# Patient Record
Sex: FEMALE | Race: WHITE | NOT HISPANIC OR LATINO | Employment: OTHER | URBAN - METROPOLITAN AREA
[De-identification: names, ages, dates, MRNs, and addresses within clinical notes are randomized per-mention and may not be internally consistent; named-entity substitution may affect disease eponyms.]

---

## 2022-12-15 ENCOUNTER — CLINICAL SUPPORT (OUTPATIENT)
Dept: REHABILITATION | Facility: OTHER | Age: 87
End: 2022-12-15
Attending: INTERNAL MEDICINE
Payer: MEDICARE

## 2022-12-15 DIAGNOSIS — G89.29 CHRONIC LEFT-SIDED LOW BACK PAIN WITHOUT SCIATICA: ICD-10-CM

## 2022-12-15 DIAGNOSIS — M25.60 DECREASED RANGE OF MOTION WITH DECREASED STRENGTH: ICD-10-CM

## 2022-12-15 DIAGNOSIS — M15.9 PRIMARY OSTEOARTHRITIS INVOLVING MULTIPLE JOINTS: ICD-10-CM

## 2022-12-15 DIAGNOSIS — M54.50 CHRONIC LEFT-SIDED LOW BACK PAIN WITHOUT SCIATICA: ICD-10-CM

## 2022-12-15 DIAGNOSIS — Z74.09 IMPAIRED FUNCTIONAL MOBILITY, BALANCE, GAIT, AND ENDURANCE: ICD-10-CM

## 2022-12-15 DIAGNOSIS — R53.1 DECREASED RANGE OF MOTION WITH DECREASED STRENGTH: ICD-10-CM

## 2022-12-15 PROCEDURE — 97110 THERAPEUTIC EXERCISES: CPT | Mod: PN

## 2022-12-15 PROCEDURE — 97162 PT EVAL MOD COMPLEX 30 MIN: CPT | Mod: PN

## 2022-12-20 PROBLEM — M54.50 CHRONIC LEFT-SIDED LOW BACK PAIN WITHOUT SCIATICA: Status: ACTIVE | Noted: 2022-12-20

## 2022-12-20 PROBLEM — M25.60 DECREASED RANGE OF MOTION WITH DECREASED STRENGTH: Status: ACTIVE | Noted: 2022-12-20

## 2022-12-20 PROBLEM — R53.1 DECREASED RANGE OF MOTION WITH DECREASED STRENGTH: Status: ACTIVE | Noted: 2022-12-20

## 2022-12-20 PROBLEM — G89.29 CHRONIC LEFT-SIDED LOW BACK PAIN WITHOUT SCIATICA: Status: ACTIVE | Noted: 2022-12-20

## 2022-12-20 PROBLEM — Z74.09 IMPAIRED FUNCTIONAL MOBILITY, BALANCE, GAIT, AND ENDURANCE: Status: ACTIVE | Noted: 2022-12-20

## 2022-12-20 NOTE — PLAN OF CARE
"[facility]   Physical Therapy Initial Evaluation     Date: 12/15/2022   Name: Sonja Weeks  Clinic Number: [id number]    Therapy Diagnosis:   Encounter Diagnoses   Name Primary?    Primary osteoarthritis involving multiple joints     Impaired functional mobility, balance, gait, and endurance     Decreased range of motion with decreased strength     Chronic left-sided low back pain without sciatica      Physician: Brennan Vazquez MD    Physician Orders: PT Eval and Treat   Medical Diagnosis from Referral: M15.9 (ICD-10-CM) - Primary osteoarthritis involving multiple joints   Evaluation Date: 12/15/2022  Authorization Period Expiration: 12/31/2022   Plan of Care Expiration: 3/15/2023  Progress Note Due: 1/15/2023  Visit # / Visits authorized: 1/ 1   FOTO: 1/3    Precautions: Standard, cancer, and HTN, scoliosis, osteoporosis, R TKA, left knee arthroscopy, left reverse shoulder replacement    Time In: 3:00pm  Time Out: 3:45pm  Total Appointment Time (timed & untimed codes): 45 minutes      SUBJECTIVE     Date of onset: 3 months ago    History of current condition - Sonja reports: recently moved to [facility] from [place] and noticed gradual weakening of BLE and left low back pain. Says that she heavily relies on a RW and LBQC now to walk around and stand. Is also noting increased difficulty with transitional movements, including stair climbing, getting in/out of chair (needs 3 pillows on top of seat to get up). Back pain is a "constant soreness" with all activities.    Pt denies drop foot, change of B/B control, saddle paresthesias, unexplained weight gain/loss, fever, chills or night sweats.     Falls: none    Imaging, none: in EPIC    Prior Therapy: none for c/c  Social History: independent living at [facility], uses elevator to get to apartment, lives alone. Recently moved to [place] from [place].  Occupation: retired  Recreational: exercise with weights at [facility] with  (Travis), " 2-3x/week  Prior Level of Function: mod independent with AD (RW, LBQC)  Current Level of Function: pain and difficulty with all functional activities, including transitional movements    Pain:  Current 5/10, worst 6/10, best 4/10   Location: left low back  Description: soreness  Aggravating Factors: stair climbing (>4 steps, requires HHAx2 with sidestepping using railing), walking with AD, getting in/out of chair/bed,   Easing Factors: biofreeze, movement, change position    Patients goals: be able to get up from a chair without pain     Medical History:   No past medical history on file.    Surgical History:   Sonja Weeks  has no past surgical history on file.    Medications:   Sonja has a current medication list which includes the following prescription(s): amlodipine, aspirin, cholecalciferol (vitamin d3), diazepam, hydrochlorothiazide, levothyroxine, and rosuvastatin.    Allergies:   Review of patient's allergies indicates:   Allergen Reactions    Codeine     Pcn [penicillins]           OBJECTIVE     Postural examination/scapula alignment: Rounded shoulder, Head forward, Posterior pelvic tilt, Abnormal trunk flexion, Slouched posture, Increased kyphosis, Decreased lordosis, and elevated iliac crest (R)  Joint integrity: severe hypo throughout TSP/LSP, with mm guarding in mid-lower LSP with tight mm endfeel    Range of Motion Right Left    AROM/PROM AROM/PROM   Hip flexion Mod felipe/WFL Mod felipe/WFL   Extension Mod felipe/min felipe  Able to perform HL bridge Mod felipe/min felipe  Able to perform HL bridge   Abduction WFL WFL   Adduction To minline To minline   ER Min felipe Mod felipe   IR Min felipe Mod felipe   Knee ext WFL WFL   Knee flexion WFL WFL   DF Min felipe Min felipe   PF WFL WFL   INV WFL WFL   EV WFL WFL     Flexibility: Right  Left  SEE Mod hypo Mod hypo  FADDIR Min hypo Mod hypo  Ely's  Mod felipe Mod felpie  Ruben NT  NT  Active SLR <60 deg <60 deg  G/S complex Max hypo Max hypo    Lower Extremity Strength  Right LE   "Left LE    Hip flexion: 3+/5 Hip flexion: 3+/5   Hip extension:  3+/5 Hip extension: 3+/5   Hip abduction: 4-/5 Hip abduction: 4-/5   Hip adduction:  4+/5 Hip adduction:  4+/5   Knee Flexion 4+/5 Knee Flexion 4+/5   Knee Extension 4/5 Knee Extension 4-/5   Ankle dorsiflexion: 5/5 Ankle dorsiflexion: 5/5   Ankle plantarflexion: 5/5 Ankle plantarflexion: 5/5       GAIT:  Assistive Device used: LBQC  Level of Assistance: mod I with AD  Patient displays the following gait deviations:  unsteady gait, decreased step length, and decreased base of support.     Bed Mobility:Assistance - mod I, definite use of HHAx2  Transfers: Assistance - mod I, HHAx2 on arm rests  Stair climbing: step through, HHAx2 on handrails, decreased speed with descent     Evaluation   Timed Up and Go  20" with AD   Single Limb Stance R LE UA without LOB   Single Limb Stance L LE UA without LOB   Self Selected Walking Speed slow   Fast Walking Speed No change   30 second Chair Rise 4x   Tandem stand  UA  <10" in staggered stance   2 Minute Walk Test NT         RODGERS Assessment - NT, consider assessing next visit    Limitation/Restriction for FOTO NOC-neurodevelopmental disorder Survey    Therapist reviewed FOTO scores for Sonja Weeks on 12/15/2022.   FOTO documents entered into [employer] - see Media section.    Limitation Score: 49%         TREATMENT     Total Treatment time (time-based codes) separate from Evaluation: 15 minutes      Sonja received the treatments listed below:      therapeutic exercises to develop strength, endurance, ROM, flexibility, posture, and core stabilization for 15 minutes including:  LTR x 10  SKTC x 10  Patient edu x 5'        PATIENT EDUCATION AND HOME EXERCISES     Education provided:   - Patient educated regarding pathogenesis, diagnosis, protocol, prognosis, POC, and HEP, including use of visual assistance for understanding of anatomy and dysfunction. Written Home Exercises Provided with written and verbal " instructions for frequency and duration of the following exercises: see list above. Pt educated on HEP and activity modifications to reduce c/o pain and improve overall function.   - Pt was educated in posture and body mechanics.  Use of a lumbar roll was recommended and demonstrated here today.  Purchase information provided.   - Pt also educated on use of modalities prn to reduce c/o pain and dysfunction.         Written Home Exercises Provided: yes. Exercises were reviewed and Sonja was able to demonstrate them prior to the end of the session.  Sonja demonstrated good  understanding of the education provided. See EMR under Patient Instructions for exercises provided during therapy sessions.    ASSESSMENT     Sonja is a 89 y.o. female referred to outpatient Physical Therapy with a medical diagnosis of low back pain with a Hx of multi-joint OA. Patient presents with marked limitations in ROM, joint and myofascial mobility, flexibility, strength, postural awareness/endurance, motor control and coordination. S/s associated with referring diagnosis with postural abnormalities and trunk/extremity weakness facilitating c/o pain, instability. Impairments limit pt with all functional activities including walking, getting in/out of a chair.      Patient prognosis is Good.   Patient will benefit from skilled outpatient Physical Therapy to address the deficits stated above and in the chart below, provide patient /family education, and to maximize patientt's level of independence.     Plan of care discussed with patient: Yes  Patient's spiritual, cultural and educational needs considered and patient is agreeable to the plan of care and goals as stated below:     Anticipated Barriers for therapy: standard    Medical Necessity is demonstrated by the following  History  Co-morbidities and personal factors that may impact the plan of care Co-morbidities:   advanced age, coping style/mechanism, and transportation assistance  required    Personal Factors:   age  social background  lifestyle     moderate   Examination  Body Structures and Functions, activity limitations and participation restrictions that may impact the plan of care Body Regions:   back  lower extremities  trunk    Body Systems:    gross symmetry  ROM  strength  gross coordinated movement  balance  gait  transfers  transitions  motor control  motor learning  Joint mobility, flexibility, myofascial mobility, posture    Participation Restrictions:   ADls, HHCs, transitional movements    Activity limitations:   Learning and applying knowledge  listening  [place]    General Tasks and Commands  undertaking multiple tasks  Requires frequent repeating of directions    Communication  no deficits    Mobility  walking    Self care  washing oneself (bathing, drying, washing hands)  caring for body parts (brushing teeth, shaving, grooming)  dressing  looking after one's health    Domestic Life  shopping  assisting others    Interactions/Relationships  no deficits    Life Areas  no deficits    Community and Social Life  community life  recreation and leisure  Rastafarian and spirituality         moderate   Clinical Presentation evolving clinical presentation with changing clinical characteristics moderate   Decision Making/ Complexity Score: moderate     Goals:  Short Term Goals (6 Weeks):  1. Pt able to stand >=30 minutes with ADLs with mod difficulty. (Not met, progressing)  2. Pt able to walk >=30 minutes with household chores with mod difficulty. (Not met, progressing)  3.  Pt able to transfer in/out of chairs 8x in 30 seconds as demonstrating improving functional strength. (Not met, progressing)  4. Pt able to ascend/descend curb, independently, 1 handrail, with mod difficulty. (Not met, progressing)  5. Pt to demonstrate improved functional ability with FOTO limitation <=39% disability. (Not met, progressing)    Long Term Goals (12 Weeks):  1. Pt able to stand >=30 minutes with ADLs  with min difficulty. (Not met, progressing)  2. Pt able to walk >=30 minutes with household chores with min difficulty. (Not met, progressing)  3.  Pt able to transfer in/out of chairs 12x in 30 seconds as demonstrating improving functional strength. (Not met, progressing)  4. Pt able to ascend/descend 1 flight of stairs, independently, 1 handrail, with min difficulty. (Not met, progressing)  5. Pt to demonstrate improved functional ability with FOTO limitation <=30% disability. (Not met, progressing)      PLAN   Plan of care Certification: 12/15/2022 to 3/15/2023.    Outpatient Physical Therapy 2 times weekly for 12 weeks to include the following interventions: Aquatic Therapy, Cervical/Lumbar Traction, Electrical Stimulation with dry needling prn, Gait Training, Iontophoresis (with dexamethasone prn), Manual Therapy, Moist Heat/ Ice, Neuromuscular Re-ed, Orthotic Management and Training, Patient Education, Self Care, Therapeutic Activities, and Therapeutic Exercise.  Progress HEP towards D/C. Recommend F/U with MD if symptoms worsen or do not resolve. Patient may be seen by a PTA for treatment to carry out their plan of care.  Face-to-face conferences will be held.     Jing Menchaca, PT      I CERTIFY THE NEED FOR THESE SERVICES FURNISHED UNDER THIS PLAN OF TREATMENT AND WHILE UNDER MY CARE   Physician's comments:     Physician's Signature: ___________________________________________________

## 2022-12-21 NOTE — PROGRESS NOTES
"[facility]   Physical Therapy Treatment Note     Name: Sonja Weeks  Clinic Number: [id number]    Therapy Diagnosis:   Encounter Diagnoses   Name Primary?    Impaired functional mobility, balance, gait, and endurance Yes    Decreased range of motion with decreased strength     Chronic left-sided low back pain without sciatica      Physician: Brennan Vazquez MD    Visit Date: 12/22/2022    Physician Orders: PT Eval and Treat   Medical Diagnosis from Referral: M15.9 (ICD-10-CM) - Primary osteoarthritis involving multiple joints   Evaluation Date: 12/15/2022  Authorization Period Expiration: 12/31/2022   Plan of Care Expiration: 3/15/2023  Progress Note Due: 1/15/2023  Visit # / Visits authorized: 1/ 20 (2 total)  FOTO: 1/3  PTA Visit #: 1/ 5    Precautions: Standard, cancer, and HTN, scoliosis, osteoporosis, R TKA, left knee arthroscopy, left reverse shoulder replacement     Time In: 3:20 pm  Time Out: 4:02 pm  Total Billable Time: 42 minutes    SUBJECTIVE   Pt reports: "Hurting a little more today". Having pain more on L side than R     She was compliant with home exercise program.  Response to previous treatment: "I felt ok"  Functional change: None today    Pain: 6-7/10  Location: L shoulder, L side of mid-low back, L ankle    OBJECTIVE     12/15/2022:  Postural examination/scapula alignment: Rounded shoulder, Head forward, Posterior pelvic tilt, Abnormal trunk flexion, Slouched posture, Increased kyphosis, Decreased lordosis, and elevated iliac crest (R)  Joint integrity: severe hypo throughout TSP/LSP, with mm guarding in mid-lower LSP with tight mm endfeel     Range of Motion Right Left     AROM/PROM AROM/PROM   Hip flexion Mod felipe/WFL Mod felipe/WFL   Extension Mod felipe/min felipe  Able to perform HL bridge Mod felipe/min felipe  Able to perform HL bridge   Abduction WFL WFL   Adduction To minline To minline   ER Min felipe Mod felipe   IR Min felipe Mod felipe   Knee ext WFL WFL   Knee flexion WFL WFL " "  DF Min felipe Min felipe   PF WFL WFL   INV WFL WFL   EV WFL WFL      Flexibility:      Right               Left  SEE            Mod hypo        Mod hypo  FADDIR           Min hypo         Mod hypo  Ely's                 Mod felipe           Mod felipe  Ruben           NT                   NT  Active SLR      <60 deg           <60 deg  G/S complex   Max hypo        Max hypo     Lower Extremity Strength  Right LE   Left LE     Hip flexion: 3+/5 Hip flexion: 3+/5   Hip extension:  3+/5 Hip extension: 3+/5   Hip abduction: 4-/5 Hip abduction: 4-/5   Hip adduction:  4+/5 Hip adduction:  4+/5   Knee Flexion 4+/5 Knee Flexion 4+/5   Knee Extension 4/5 Knee Extension 4-/5   Ankle dorsiflexion: 5/5 Ankle dorsiflexion: 5/5   Ankle plantarflexion: 5/5 Ankle plantarflexion: 5/5      GAIT:  Assistive Device used: LBQC  Level of Assistance: mod I with AD  Patient displays the following gait deviations:  unsteady gait, decreased step length, and decreased base of support.      Bed Mobility:Assistance - mod I, definite use of HHAx2  Transfers: Assistance - mod I, HHAx2 on arm rests  Stair climbing: step through, HHAx2 on handrails, decreased speed with descent       Evaluation   Timed Up and Go  20" with AD   Single Limb Stance R LE UA without LOB   Single Limb Stance L LE UA without LOB   Self Selected Walking Speed slow   Fast Walking Speed No change   30 second Chair Rise 4x   Tandem stand  UA  <10" in staggered stance   2 Minute Walk Test NT      RODGERS Assessment - NT, consider assessing next visit     Limitation/Restriction for FOTO NOC-neurodevelopmental disorder Survey     Therapist reviewed FOTO scores for Sonja Weeks on 12/15/2022.   FOTO documents entered into [employer] - see Media section.     Limitation Score: 49%         TREATMENT     Sonja received the bolded treatments listed below:      Patient received therapeutic exercises for 18 minutes for improved strength and AROM including:  LTR x 3'  SKTC x 10  +DKTC with PB x " "2'  +Bridges x 10  +SAQ 2 x 10  +Supine ball squeeze x 20    Patient received manual therapeutic technique for 00 minutes for improved soft tissue and joint mobility including:      Patient received neuromuscular reeducation for 24 minutes for improved proprioception and balance including:  +Forward walking in \\ bars (without AD)  +Lateral walking in \\ bars (without AD)  +NBOS on foam 3 x 30"  +Step ups on 4" step in \\ bars      Patient received therapeutic activities for 00 minutes for improved tolerance to functional activities including:      PATIENT EDUCATION AND HOME EXERCISES     Home Exercises Provided and Patient Education Provided     Education provided:   PT educated pt on importance of compliance with their HEP this visit.     Written Home Exercises Provided: Patient instructed to cont prior HEP. Exercises were reviewed and Sonja was able to demonstrate them prior to the end of the session.  Sonja demonstrated fair  understanding of the education provided. See EMR under Patient Instructions for exercises provided during therapy sessions    ASSESSMENT   Pt tolerated new exercises well with minor increase in low back pain with bridges, but able to complete reps. Pt able to complete balance activities without use of UE, but required SBA for safety.    Sonja Is progressing well towards her goals.   Pt prognosis is Good.     Pt will continue to benefit from skilled outpatient physical therapy to address the deficits listed in the problem list box on initial evaluation, provide pt/family education and to maximize pt's level of independence in the home and community environment.     Pt's spiritual, cultural and educational needs considered and pt agreeable to plan of care and goals.     Anticipated barriers to physical therapy: None    Goals:   Short Term Goals (6 Weeks):  1. Pt able to stand >=30 minutes with ADLs with mod difficulty. (Not met, progressing)  2. Pt able to walk >=30 minutes with " household chores with mod difficulty. (Not met, progressing)  3.  Pt able to transfer in/out of chairs 8x in 30 seconds as demonstrating improving functional strength. (Not met, progressing)  4. Pt able to ascend/descend curb, independently, 1 handrail, with mod difficulty. (Not met, progressing)  5. Pt to demonstrate improved functional ability with FOTO limitation <=39% disability. (Not met, progressing)     Long Term Goals (12 Weeks):  1. Pt able to stand >=30 minutes with ADLs with min difficulty. (Not met, progressing)  2. Pt able to walk >=30 minutes with household chores with min difficulty. (Not met, progressing)  3.  Pt able to transfer in/out of chairs 12x in 30 seconds as demonstrating improving functional strength. (Not met, progressing)  4. Pt able to ascend/descend 1 flight of stairs, independently, 1 handrail, with min difficulty. (Not met, progressing)  5. Pt to demonstrate improved functional ability with FOTO limitation <=30% disability. (Not met, progressing)    PLAN   Plan of care Certification: 12/15/2022 to 3/15/2023.    Improve B LE strength and balance     Outpatient Physical Therapy 2 times weekly for 12 weeks to include the following interventions: Aquatic Therapy, Cervical/Lumbar Traction, Electrical Stimulation with dry needling prn, Gait Training, Iontophoresis (with dexamethasone prn), Manual Therapy, Moist Heat/ Ice, Neuromuscular Re-ed, Orthotic Management and Training, Patient Education, Self Care, Therapeutic Activities, and Therapeutic Exercise.  Progress HEP towards D/C. Recommend F/U with MD if symptoms worsen or do not resolve      Danay Ni III, PTA

## 2022-12-22 ENCOUNTER — CLINICAL SUPPORT (OUTPATIENT)
Dept: REHABILITATION | Facility: OTHER | Age: 87
End: 2022-12-22
Payer: MEDICARE

## 2022-12-22 DIAGNOSIS — R53.1 DECREASED RANGE OF MOTION WITH DECREASED STRENGTH: ICD-10-CM

## 2022-12-22 DIAGNOSIS — Z74.09 IMPAIRED FUNCTIONAL MOBILITY, BALANCE, GAIT, AND ENDURANCE: Primary | ICD-10-CM

## 2022-12-22 DIAGNOSIS — G89.29 CHRONIC LEFT-SIDED LOW BACK PAIN WITHOUT SCIATICA: ICD-10-CM

## 2022-12-22 DIAGNOSIS — M25.60 DECREASED RANGE OF MOTION WITH DECREASED STRENGTH: ICD-10-CM

## 2022-12-22 DIAGNOSIS — M54.50 CHRONIC LEFT-SIDED LOW BACK PAIN WITHOUT SCIATICA: ICD-10-CM

## 2022-12-22 PROCEDURE — 97112 NEUROMUSCULAR REEDUCATION: CPT | Mod: PN,CQ

## 2022-12-22 PROCEDURE — 97110 THERAPEUTIC EXERCISES: CPT | Mod: PN,CQ

## 2023-01-03 ENCOUNTER — CLINICAL SUPPORT (OUTPATIENT)
Dept: REHABILITATION | Facility: OTHER | Age: 88
End: 2023-01-03
Payer: MEDICARE

## 2023-01-03 DIAGNOSIS — M25.60 DECREASED RANGE OF MOTION WITH DECREASED STRENGTH: ICD-10-CM

## 2023-01-03 DIAGNOSIS — Z74.09 IMPAIRED FUNCTIONAL MOBILITY, BALANCE, GAIT, AND ENDURANCE: Primary | ICD-10-CM

## 2023-01-03 DIAGNOSIS — G89.29 CHRONIC LEFT-SIDED LOW BACK PAIN WITHOUT SCIATICA: ICD-10-CM

## 2023-01-03 DIAGNOSIS — M54.50 CHRONIC LEFT-SIDED LOW BACK PAIN WITHOUT SCIATICA: ICD-10-CM

## 2023-01-03 DIAGNOSIS — R53.1 DECREASED RANGE OF MOTION WITH DECREASED STRENGTH: ICD-10-CM

## 2023-01-03 PROCEDURE — 97110 THERAPEUTIC EXERCISES: CPT | Mod: PN

## 2023-01-03 PROCEDURE — 97530 THERAPEUTIC ACTIVITIES: CPT | Mod: PN

## 2023-01-03 NOTE — PROGRESS NOTES
"[facility]   Physical Therapy Treatment Note     Name: Sonja Weeks  Clinic Number: [id number]    Therapy Diagnosis:   Encounter Diagnoses   Name Primary?    Impaired functional mobility, balance, gait, and endurance Yes    Decreased range of motion with decreased strength     Chronic left-sided low back pain without sciatica        Physician: Brennan Vazquez MD    Visit Date: 1/3/2023    Physician Orders: PT Eval and Treat   Medical Diagnosis from Referral: M15.9 (ICD-10-CM) - Primary osteoarthritis involving multiple joints   Evaluation Date: 12/15/2022  Authorization Period Expiration: 12/31/2022   Plan of Care Expiration: 3/15/2023  Progress Note Due: 1/15/2023  Visit # / Visits authorized: 2/ 20 (3 total)  FOTO: 1/3  PTA Visit #: 0/ 5    Precautions: Standard, cancer, and HTN, scoliosis, osteoporosis, R TKA, left knee arthroscopy, left reverse shoulder replacement     Time In: 2:15 pm  Time Out: 3:00 pm  Total Billable Time: 45 minutes    SUBJECTIVE   Pt reports: she is able to walk more at home using her cane instead of her RW. Continues to have LB with feelings of leg weakness, but she notes her legs are getting stronger as she does not need as many pillows on her chair to help with standing up now.    She was compliant with home exercise program.  Response to previous treatment: "I felt ok"  Functional change: None today    Pain: 6-7/10  Location: L shoulder, L side of mid-low back, L ankle    OBJECTIVE     See eval on 12/15/2022 for updated tests/measurements.       Range of Motion Right Left     AROM/PROM AROM/PROM   Hip flexion Mod felipe/WFL Mod felipe/WFL   Extension Mod felipe/min felipe  Able to perform HL bridge Mod felipe/min felipe  Able to perform HL bridge   Abduction WFL WFL   Adduction To minline To minline   ER Min felipe Mod felipe   IR Min felipe Mod felipe   Knee ext WFL WFL   Knee flexion WFL WFL   DF Min felipe Min felipe   PF WFL WFL   INV WFL WFL   EV WFL WFL      Flexibility:      Right  " "             Left  ESE            Mod hypo        Mod hypo  FADDIR           Min hypo         Mod hypo  Ely's                 Mod felipe           Mod felipe  Ruben           NT                   NT  Active SLR      <60 deg           <60 deg  G/S complex   Max hypo        Max hypo     Lower Extremity Strength  Right LE   Left LE     Hip flexion: 3+/5 Hip flexion: 3+/5   Hip extension:  3+/5 Hip extension: 3+/5   Hip abduction: 4-/5 Hip abduction: 4-/5   Hip adduction:  4+/5 Hip adduction:  4+/5   Knee Flexion 4+/5 Knee Flexion 4+/5   Knee Extension 4/5 Knee Extension 4-/5   Ankle dorsiflexion: 5/5 Ankle dorsiflexion: 5/5   Ankle plantarflexion: 5/5 Ankle plantarflexion: 5/5      GAIT:  Assistive Device used: LBQC  Level of Assistance: mod I with AD  Patient displays the following gait deviations:  unsteady gait, decreased step length, and decreased base of support.      Bed Mobility:Assistance - mod I, definite use of HHAx2  Transfers: Assistance - mod I, HHAx2 on arm rests  Stair climbing: step through, HHAx2 on handrails, decreased speed with descent       Evaluation   Timed Up and Go  20" with AD   Single Limb Stance R LE UA without LOB   Single Limb Stance L LE UA without LOB   Self Selected Walking Speed slow   Fast Walking Speed No change   30 second Chair Rise 4x   Tandem stand  UA  <10" in staggered stance   2 Minute Walk Test NT      RODGERS Assessment - NT, consider assessing next visit     Limitation/Restriction for FOTO NOC-neurodevelopmental disorder Survey     Therapist reviewed FOTO scores for Sonja Weeks on 12/15/2022.   FOTO documents entered into [employer] - see Media section.     Limitation Score: 49%         TREATMENT     Sonja received the bolded treatments listed below:      Patient received therapeutic exercises for 30 minutes for improved strength and AROM including:  LTR x 2' AROM  DKTC with PB x 2'  +resisted LTR x 2' - manual perturbations with PT assist today  SKTC x 10  +andrew cane flex, SA " "punches x 20 ea  Bridges + glute set x 2 min   SAQ 2 x 10  Supine ball squeeze x 20  +SLR with pilates ring press down x 10 ea    Patient received manual therapeutic technique for 00 minutes for improved soft tissue and joint mobility including:      Patient received neuromuscular reeducation for 00 minutes for improved proprioception and balance including:  +Forward walking in \\ bars (without AD)  +Lateral walking in \\ bars (without AD)  +NBOS on foam 3 x 30"        Patient received therapeutic activities for 15 minutes for improved tolerance to functional activities including:  +sit to stands (on hi lo table for adjustable height): 1 x 10 x 21" tall, 1 x 10 x 20" tall, 1 x 10 x 19" tall - HHA x 2 with 50% effort/use with hands  +Step ups on 4" step in \\ bars x 10        PATIENT EDUCATION AND HOME EXERCISES     Home Exercises Provided and Patient Education Provided     Education provided:   PT educated pt on importance of compliance with their HEP this visit.     Written Home Exercises Provided: Patient instructed to cont prior HEP. Exercises were reviewed and Sonja was able to demonstrate them prior to the end of the session.  Sonja demonstrated fair  understanding of the education provided. See EMR under Patient Instructions for exercises provided during therapy sessions    ASSESSMENT     Progressed LE strengthening today with progressive lowing of seat. Good tolerance but noted increase in HHAx2 with seat height close to standard height. Able to perform repetitions at each seat height without increased c/o LBP or fatigue in BLE.    Sonja Is progressing well towards her goals.   Pt prognosis is Good.     Pt will continue to benefit from skilled outpatient physical therapy to address the deficits listed in the problem list box on initial evaluation, provide pt/family education and to maximize pt's level of independence in the home and community environment.     Pt's spiritual, cultural and educational " needs considered and pt agreeable to plan of care and goals.     Anticipated barriers to physical therapy: None    Goals:   Short Term Goals (6 Weeks):  1. Pt able to stand >=30 minutes with ADLs with mod difficulty. (Not met, progressing)  2. Pt able to walk >=30 minutes with household chores with mod difficulty. (Not met, progressing)  3.  Pt able to transfer in/out of chairs 8x in 30 seconds as demonstrating improving functional strength. (Not met, progressing)  4. Pt able to ascend/descend curb, independently, 1 handrail, with mod difficulty. (Not met, progressing)  5. Pt to demonstrate improved functional ability with FOTO limitation <=39% disability. (Not met, progressing)     Long Term Goals (12 Weeks):  1. Pt able to stand >=30 minutes with ADLs with min difficulty. (Not met, progressing)  2. Pt able to walk >=30 minutes with household chores with min difficulty. (Not met, progressing)  3.  Pt able to transfer in/out of chairs 12x in 30 seconds as demonstrating improving functional strength. (Not met, progressing)  4. Pt able to ascend/descend 1 flight of stairs, independently, 1 handrail, with min difficulty. (Not met, progressing)  5. Pt to demonstrate improved functional ability with FOTO limitation <=30% disability. (Not met, progressing)    PLAN   Plan of care Certification: 12/15/2022 to 3/15/2023.    Improve B LE strength and balance     Outpatient Physical Therapy 2 times weekly for 12 weeks to include the following interventions: Aquatic Therapy, Cervical/Lumbar Traction, Electrical Stimulation with dry needling prn, Gait Training, Iontophoresis (with dexamethasone prn), Manual Therapy, Moist Heat/ Ice, Neuromuscular Re-ed, Orthotic Management and Training, Patient Education, Self Care, Therapeutic Activities, and Therapeutic Exercise.  Progress HEP towards D/C. Recommend F/U with MD if symptoms worsen or do not resolve      Jing Menchaca III, PTA

## 2023-01-05 ENCOUNTER — CLINICAL SUPPORT (OUTPATIENT)
Dept: REHABILITATION | Facility: OTHER | Age: 88
End: 2023-01-05
Payer: MEDICARE

## 2023-01-05 DIAGNOSIS — Z74.09 IMPAIRED FUNCTIONAL MOBILITY, BALANCE, GAIT, AND ENDURANCE: Primary | ICD-10-CM

## 2023-01-05 DIAGNOSIS — M54.50 CHRONIC LEFT-SIDED LOW BACK PAIN WITHOUT SCIATICA: ICD-10-CM

## 2023-01-05 DIAGNOSIS — M25.60 DECREASED RANGE OF MOTION WITH DECREASED STRENGTH: ICD-10-CM

## 2023-01-05 DIAGNOSIS — G89.29 CHRONIC LEFT-SIDED LOW BACK PAIN WITHOUT SCIATICA: ICD-10-CM

## 2023-01-05 DIAGNOSIS — R53.1 DECREASED RANGE OF MOTION WITH DECREASED STRENGTH: ICD-10-CM

## 2023-01-05 PROCEDURE — 97110 THERAPEUTIC EXERCISES: CPT | Mod: PN

## 2023-01-05 PROCEDURE — 97530 THERAPEUTIC ACTIVITIES: CPT | Mod: PN

## 2023-01-05 NOTE — PROGRESS NOTES
"[facility]   Physical Therapy Treatment Note     Name: Sonja Weeks  Clinic Number: [id number]    Therapy Diagnosis:   Encounter Diagnoses   Name Primary?    Impaired functional mobility, balance, gait, and endurance Yes    Decreased range of motion with decreased strength     Chronic left-sided low back pain without sciatica        Physician: Brennan Vazquez MD    Visit Date: 1/5/2023    Physician Orders: PT Eval and Treat   Medical Diagnosis from Referral: M15.9 (ICD-10-CM) - Primary osteoarthritis involving multiple joints   Evaluation Date: 12/15/2022  Authorization Period Expiration: 12/31/2022   Plan of Care Expiration: 3/15/2023  Progress Note Due: 1/15/2023  Visit # / Visits authorized: 3/ 20 (4 total)  FOTO: 1/3  PTA Visit #: 0/ 5    Precautions: Standard, cancer, and HTN, scoliosis, osteoporosis, R TKA, left knee arthroscopy, left reverse shoulder replacement     Time In: 1115am  Time Out: 1200 pm  Total Billable Time: 45 minutes    SUBJECTIVE   Pt reports: left low back pain and knee pain persist.    She was compliant with home exercise program.  Response to previous treatment: "I felt ok"  Functional change: None today    Pain: 6-7/10  Location: L shoulder, L side of mid-low back, L ankle    OBJECTIVE     See eval on 12/15/2022 for updated tests/measurements.       Range of Motion Right Left     AROM/PROM AROM/PROM   Hip flexion Mod felipe/WFL Mod felipe/WFL   Extension Mod felipe/min felipe  Able to perform HL bridge Mod felipe/min felipe  Able to perform HL bridge   Abduction WFL WFL   Adduction To minline To minline   ER Min felipe Mod felipe   IR Min felipe Mod felipe   Knee ext WFL WFL   Knee flexion WFL WFL   DF Min felipe Min felipe   PF WFL WFL   INV WFL WFL   EV WFL WFL      Flexibility:      Right               Left  SEE            Mod hypo        Mod hypo  FADDIR           Min hypo         Mod hypo  Ely's                 Mod felipe           Mod felipe  Ruben           NT                   " "NT  Active SLR      <60 deg           <60 deg  G/S complex   Max hypo        Max hypo     Lower Extremity Strength  Right LE   Left LE     Hip flexion: 3+/5 Hip flexion: 3+/5   Hip extension:  3+/5 Hip extension: 3+/5   Hip abduction: 4-/5 Hip abduction: 4-/5   Hip adduction:  4+/5 Hip adduction:  4+/5   Knee Flexion 4+/5 Knee Flexion 4+/5   Knee Extension 4/5 Knee Extension 4-/5   Ankle dorsiflexion: 5/5 Ankle dorsiflexion: 5/5   Ankle plantarflexion: 5/5 Ankle plantarflexion: 5/5      GAIT:  Assistive Device used: LBQC  Level of Assistance: mod I with AD  Patient displays the following gait deviations:  unsteady gait, decreased step length, and decreased base of support.      Bed Mobility:Assistance - mod I, definite use of HHAx2  Transfers: Assistance - mod I, HHAx2 on arm rests  Stair climbing: step through, HHAx2 on handrails, decreased speed with descent       Evaluation   Timed Up and Go  20" with AD   Single Limb Stance R LE UA without LOB   Single Limb Stance L LE UA without LOB   Self Selected Walking Speed slow   Fast Walking Speed No change   30 second Chair Rise 4x   Tandem stand  UA  <10" in staggered stance   2 Minute Walk Test NT      RODGERS Assessment - NT, consider assessing next visit     Limitation/Restriction for FOTO NOC-neurodevelopmental disorder Survey     Therapist reviewed FOTO scores for Sonja Weeks on 12/15/2022.   FOTO documents entered into [employer] - see Media section.     Limitation Score: 49%         TREATMENT     Sonja received the bolded treatments listed below:      Patient received therapeutic exercises for 30 minutes for improved strength and AROM including:  LTR x 2x10 AROM  DKTC with PB x 2'  +resisted LTR x 2' - manual perturbations with PT assist today  Bridges on ball 2x10  SKTC x 10  +andrew cane flex, SA punches x 20 ea  Bridges + glute set x 2 min   SAQ 2 x 10  Supine ball squeeze x 20  +SLR with pilates ring press down x 10 ea    Patient received manual therapeutic " "technique for 00 minutes for improved soft tissue and joint mobility including:      Patient received neuromuscular reeducation for 00 minutes for improved proprioception and balance including:  +Forward walking in \\ bars (without AD)  +Lateral walking in \\ bars (without AD)  +NBOS on foam 3 x 30"          Patient received therapeutic activities for 15 minutes for improved tolerance to functional activities including:  +sit to stands (on hi lo table for adjustable height): 1 x 10 x 21" tall, 1 x 10 x 20" tall, 1 x 10 x 19" tall - HHA x 2 with 50% effort/use with hands  +Step ups on 4" step in \\ bars 2 x 10  Tandem walk in p bars 5 laps  Standing marching x20  Side stepping 5 laps        PATIENT EDUCATION AND HOME EXERCISES     Home Exercises Provided and Patient Education Provided     Education provided:   PT educated pt on importance of compliance with their HEP this visit.     Written Home Exercises Provided: Patient instructed to cont prior HEP. Exercises were reviewed and Sonja was able to demonstrate them prior to the end of the session.  Sonja demonstrated fair  understanding of the education provided. See EMR under Patient Instructions for exercises provided during therapy sessions    ASSESSMENT   Pt tolerated therapeutic interventions well with no complaint of increased pain. Patient reported relief after physical therapy treatment today. Pt able to complete balance activities without use of UE, but required SBA for safety.    Sonja Is progressing well towards her goals.   Pt prognosis is Good.     Pt will continue to benefit from skilled outpatient physical therapy to address the deficits listed in the problem list box on initial evaluation, provide pt/family education and to maximize pt's level of independence in the home and community environment.     Pt's spiritual, cultural and educational needs considered and pt agreeable to plan of care and goals.     Anticipated barriers to physical therapy: " None    Goals:   Short Term Goals (6 Weeks):  1. Pt able to stand >=30 minutes with ADLs with mod difficulty. (Not met, progressing)  2. Pt able to walk >=30 minutes with household chores with mod difficulty. (Not met, progressing)  3.  Pt able to transfer in/out of chairs 8x in 30 seconds as demonstrating improving functional strength. (Not met, progressing)  4. Pt able to ascend/descend curb, independently, 1 handrail, with mod difficulty. (Not met, progressing)  5. Pt to demonstrate improved functional ability with FOTO limitation <=39% disability. (Not met, progressing)     Long Term Goals (12 Weeks):  1. Pt able to stand >=30 minutes with ADLs with min difficulty. (Not met, progressing)  2. Pt able to walk >=30 minutes with household chores with min difficulty. (Not met, progressing)  3.  Pt able to transfer in/out of chairs 12x in 30 seconds as demonstrating improving functional strength. (Not met, progressing)  4. Pt able to ascend/descend 1 flight of stairs, independently, 1 handrail, with min difficulty. (Not met, progressing)  5. Pt to demonstrate improved functional ability with FOTO limitation <=30% disability. (Not met, progressing)    PLAN   Plan of care Certification: 12/15/2022 to 3/15/2023.    Improve B LE strength and balance     Outpatient Physical Therapy 2 times weekly for 12 weeks to include the following interventions: Aquatic Therapy, Cervical/Lumbar Traction, Electrical Stimulation with dry needling prn, Gait Training, Iontophoresis (with dexamethasone prn), Manual Therapy, Moist Heat/ Ice, Neuromuscular Re-ed, Orthotic Management and Training, Patient Education, Self Care, Therapeutic Activities, and Therapeutic Exercise.  Progress HEP towards D/C. Recommend F/U with MD if symptoms worsen or do not resolve      Dickson Child III, PTA

## 2023-01-10 ENCOUNTER — CLINICAL SUPPORT (OUTPATIENT)
Dept: REHABILITATION | Facility: OTHER | Age: 88
End: 2023-01-10
Payer: MEDICARE

## 2023-01-10 DIAGNOSIS — R53.1 DECREASED RANGE OF MOTION WITH DECREASED STRENGTH: ICD-10-CM

## 2023-01-10 DIAGNOSIS — Z74.09 IMPAIRED FUNCTIONAL MOBILITY, BALANCE, GAIT, AND ENDURANCE: Primary | ICD-10-CM

## 2023-01-10 DIAGNOSIS — M25.60 DECREASED RANGE OF MOTION WITH DECREASED STRENGTH: ICD-10-CM

## 2023-01-10 DIAGNOSIS — G89.29 CHRONIC LEFT-SIDED LOW BACK PAIN WITHOUT SCIATICA: ICD-10-CM

## 2023-01-10 DIAGNOSIS — M54.50 CHRONIC LEFT-SIDED LOW BACK PAIN WITHOUT SCIATICA: ICD-10-CM

## 2023-01-10 PROCEDURE — 97530 THERAPEUTIC ACTIVITIES: CPT | Mod: PN

## 2023-01-10 PROCEDURE — 97110 THERAPEUTIC EXERCISES: CPT | Mod: PN

## 2023-01-10 NOTE — PROGRESS NOTES
"[facility]   Physical Therapy Treatment Note     Name: Sonja Weeks  Clinic Number: [id number]    Therapy Diagnosis:   Encounter Diagnoses   Name Primary?    Impaired functional mobility, balance, gait, and endurance Yes    Decreased range of motion with decreased strength     Chronic left-sided low back pain without sciatica        Physician: Brennan Vazquez MD    Visit Date: 1/10/2023    Physician Orders: PT Eval and Treat   Medical Diagnosis from Referral: M15.9 (ICD-10-CM) - Primary osteoarthritis involving multiple joints   Evaluation Date: 12/15/2022  Authorization Period Expiration: 12/31/2022   Plan of Care Expiration: 3/15/2023  Progress Note Due: 1/15/2023  Visit # / Visits authorized: 4/ 20 (5 total)  FOTO: 1/3  PTA Visit #: 0/ 5    Precautions: Standard, cancer, and HTN, scoliosis, osteoporosis, R TKA, left knee arthroscopy, left reverse shoulder replacement     Time In: 1115am  Time Out: 1200 pm  Total Billable Time: 45 minutes    SUBJECTIVE   Pt reports: "I did not put any biofreeze on my leg and I can really feel the pulling from my back and down my left leg today."    She was compliant with home exercise program.  Response to previous treatment: "I felt ok"  Functional change: None today    Pain: 6-7/10  Location: L shoulder, L side of mid-low back, L ankle    OBJECTIVE     See eval on 12/15/2022 for updated tests/measurements.     TREATMENT     Sonja received the bolded treatments listed below:      Patient received therapeutic exercises for 30 minutes for improved strength and AROM including:  LTR x 2x10 AROM  DKTC with PB x 2'  resisted LTR x 2' - manual perturbations with PT assist today  Bridges on ball 2x10  SKTC x 10  andrew cane flex, SA punches x 20 ea  Bridges + glute set x 2 min   SAQ 2 x 10  Supine ball squeeze x 20  SLR with pilates ring press down 2x 10 ea    Patient received manual therapeutic technique for 00 minutes for improved soft tissue and joint " "mobility including:      Patient received neuromuscular reeducation for 00 minutes for improved proprioception and balance including:  +Forward walking in \\ bars (without AD)  +Lateral walking in \\ bars (without AD)  +NBOS on foam 3 x 30"          Patient received therapeutic activities for 15 minutes for improved tolerance to functional activities including:  sit to stands (on hi lo table for adjustable height): 2 x 10 x 20" tall, 1 x 10 x 19" tall - HHA x 2 with 50% effort/use with hands  Step ups on 4" step in \\ bars 2 x 10  Tandem walk in // bars 5 laps  Standing marching x20  Side stepping 5 laps        PATIENT EDUCATION AND HOME EXERCISES     Home Exercises Provided and Patient Education Provided     Education provided:   PT educated pt on importance of compliance with their HEP this visit.     Written Home Exercises Provided: Patient instructed to cont prior HEP. Exercises were reviewed and Sonja was able to demonstrate them prior to the end of the session.  Sonja demonstrated fair  understanding of the education provided. See EMR under Patient Instructions for exercises provided during therapy sessions    ASSESSMENT     Pt notes Increased L low back/flank pain during sit to stands, possibly due to excessive use of BUE during push up into standing due to Morgan glute, quad weakness.    Sonja Is progressing well towards her goals.   Pt prognosis is Good.     Pt will continue to benefit from skilled outpatient physical therapy to address the deficits listed in the problem list box on initial evaluation, provide pt/family education and to maximize pt's level of independence in the home and community environment.     Pt's spiritual, cultural and educational needs considered and pt agreeable to plan of care and goals.     Anticipated barriers to physical therapy: None    Goals:   Short Term Goals (6 Weeks):  1. Pt able to stand >=30 minutes with ADLs with mod difficulty. (Not met, progressing)  2. Pt able to " walk >=30 minutes with household chores with mod difficulty. (Not met, progressing)  3.  Pt able to transfer in/out of chairs 8x in 30 seconds as demonstrating improving functional strength. (Not met, progressing)  4. Pt able to ascend/descend curb, independently, 1 handrail, with mod difficulty. (Not met, progressing)  5. Pt to demonstrate improved functional ability with FOTO limitation <=39% disability. (Not met, progressing)     Long Term Goals (12 Weeks):  1. Pt able to stand >=30 minutes with ADLs with min difficulty. (Not met, progressing)  2. Pt able to walk >=30 minutes with household chores with min difficulty. (Not met, progressing)  3.  Pt able to transfer in/out of chairs 12x in 30 seconds as demonstrating improving functional strength. (Not met, progressing)  4. Pt able to ascend/descend 1 flight of stairs, independently, 1 handrail, with min difficulty. (Not met, progressing)  5. Pt to demonstrate improved functional ability with FOTO limitation <=30% disability. (Not met, progressing)    PLAN   Plan of care Certification: 12/15/2022 to 3/15/2023.    Improve B LE strength and balance     Outpatient Physical Therapy 2 times weekly for 12 weeks to include the following interventions: Aquatic Therapy, Cervical/Lumbar Traction, Electrical Stimulation with dry needling prn, Gait Training, Iontophoresis (with dexamethasone prn), Manual Therapy, Moist Heat/ Ice, Neuromuscular Re-ed, Orthotic Management and Training, Patient Education, Self Care, Therapeutic Activities, and Therapeutic Exercise.  Progress HEP towards D/C. Recommend F/U with MD if symptoms worsen or do not resolve      Jing Menchaca III, PTA

## 2023-01-12 ENCOUNTER — CLINICAL SUPPORT (OUTPATIENT)
Dept: REHABILITATION | Facility: OTHER | Age: 88
End: 2023-01-12
Payer: MEDICARE

## 2023-01-12 DIAGNOSIS — M25.60 DECREASED RANGE OF MOTION WITH DECREASED STRENGTH: ICD-10-CM

## 2023-01-12 DIAGNOSIS — Z74.09 IMPAIRED FUNCTIONAL MOBILITY, BALANCE, GAIT, AND ENDURANCE: Primary | ICD-10-CM

## 2023-01-12 DIAGNOSIS — G89.29 CHRONIC LEFT-SIDED LOW BACK PAIN WITHOUT SCIATICA: ICD-10-CM

## 2023-01-12 DIAGNOSIS — R53.1 DECREASED RANGE OF MOTION WITH DECREASED STRENGTH: ICD-10-CM

## 2023-01-12 DIAGNOSIS — M54.50 CHRONIC LEFT-SIDED LOW BACK PAIN WITHOUT SCIATICA: ICD-10-CM

## 2023-01-12 PROCEDURE — 97140 MANUAL THERAPY 1/> REGIONS: CPT | Mod: PN

## 2023-01-12 PROCEDURE — 97530 THERAPEUTIC ACTIVITIES: CPT | Mod: PN

## 2023-01-12 PROCEDURE — 97110 THERAPEUTIC EXERCISES: CPT | Mod: PN

## 2023-01-12 NOTE — PROGRESS NOTES
"[facility]   Physical Therapy Treatment Note     Name: Sonja Weeks  Clinic Number: [id number]    Therapy Diagnosis:   Encounter Diagnoses   Name Primary?    Impaired functional mobility, balance, gait, and endurance Yes    Decreased range of motion with decreased strength     Chronic left-sided low back pain without sciatica        Physician: Brennan Vazquez MD    Visit Date: 1/12/2023    Physician Orders: PT Eval and Treat   Medical Diagnosis from Referral: M15.9 (ICD-10-CM) - Primary osteoarthritis involving multiple joints   Evaluation Date: 12/15/2022  Authorization Period Expiration: 12/31/2022   Plan of Care Expiration: 3/15/2023  Progress Note Due: 1/15/2023  Visit # / Visits authorized: 4/ 20 (5 total)  FOTO: 1/3  PTA Visit #: 0/ 5    Precautions: Standard, cancer, and HTN, scoliosis, osteoporosis, R TKA, left knee arthroscopy, left reverse shoulder replacement     Time In: 1115am  Time Out: 1200 pm  Total Billable Time: 45 minutes    SUBJECTIVE   Pt reports: Feeling better with the exercises, but left side low back pain persists.    She was compliant with home exercise program.  Response to previous treatment: "I felt ok"  Functional change: None today    Pain: 6-7/10  Location: L shoulder, L side of mid-low back, L ankle    OBJECTIVE     See eval on 12/15/2022 for updated tests/measurements.     TREATMENT     Sonja received the bolded treatments listed below:      Patient received therapeutic exercises for 15 minutes for improved strength and AROM including:  LTR x 2x10 AROM  DKTC with PB x 2'  resisted LTR x 2' - manual perturbations with PT assist today  Bridges on ball 2x10  SKTC x 10  andrew cane flex, SA punches x 20 ea  Bridges + glute set x 2 min   SAQ 2 x 10  Supine ball squeeze x 20  SLR with pilates ring press down 2x 10 ea    Patient received manual therapeutic technique for 20 minutes for improved soft tissue and joint mobility including:    Application of TDN: " "Pt educated on benefits and potential side effects of dry needling. Educated pt on benefits, precautions, side effects following TDN. Educated pt to use heat following treatment sessions if pt is experiencing pain or soreness. Pt verbalized good understanding of education.  Pt signed written consent to dry needling. Pt gave verbal consent for DN    Pt received dry needling to the below listed muscles using 60 mm needles.  Left L3-5 multifidi    With application of electrical stimulation treatment    Pt tolerated treatment well with no adverse events noted.        Patient received neuromuscular reeducation for 00 minutes for improved proprioception and balance including:  +Forward walking in \\ bars (without AD)  +Lateral walking in \\ bars (without AD)  +NBOS on foam 3 x 30"      Patient received therapeutic activities for 8 minutes for improved tolerance to functional activities including:  sit to stands (on hi lo table for adjustable height): 2 x 10 x 20" tall, 1 x 10 x 19" tall - HHA x 2 with 50% effort/use with hands  Step ups on 4" step in \\ bars 2 x 10  Tandem walk in // bars 5 laps  Standing marching x20  Side stepping 5 laps        PATIENT EDUCATION AND HOME EXERCISES     Home Exercises Provided and Patient Education Provided     Education provided:   PT educated pt on importance of compliance with their HEP this visit.     Written Home Exercises Provided: Patient instructed to cont prior HEP. Exercises were reviewed and Sonja was able to demonstrate them prior to the end of the session.  Sonja demonstrated fair  understanding of the education provided. See EMR under Patient Instructions for exercises provided during therapy sessions    ASSESSMENT     Pt tolerated therapeutic interventions well with no complaint of increased pain. Patient reported relief after physical therapy treatment today. She reported decreased low back pain with bridges and DKC after dry needling treatment. We will continue to " progress core strength and functional ability while monitoring her low back pain.     Sonja Is progressing well towards her goals.   Pt prognosis is Good.     Pt will continue to benefit from skilled outpatient physical therapy to address the deficits listed in the problem list box on initial evaluation, provide pt/family education and to maximize pt's level of independence in the home and community environment.     Pt's spiritual, cultural and educational needs considered and pt agreeable to plan of care and goals.     Anticipated barriers to physical therapy: None    Goals:   Short Term Goals (6 Weeks):  1. Pt able to stand >=30 minutes with ADLs with mod difficulty. (Not met, progressing)  2. Pt able to walk >=30 minutes with household chores with mod difficulty. (Not met, progressing)  3.  Pt able to transfer in/out of chairs 8x in 30 seconds as demonstrating improving functional strength. (Not met, progressing)  4. Pt able to ascend/descend curb, independently, 1 handrail, with mod difficulty. (Not met, progressing)  5. Pt to demonstrate improved functional ability with FOTO limitation <=39% disability. (Not met, progressing)     Long Term Goals (12 Weeks):  1. Pt able to stand >=30 minutes with ADLs with min difficulty. (Not met, progressing)  2. Pt able to walk >=30 minutes with household chores with min difficulty. (Not met, progressing)  3.  Pt able to transfer in/out of chairs 12x in 30 seconds as demonstrating improving functional strength. (Not met, progressing)  4. Pt able to ascend/descend 1 flight of stairs, independently, 1 handrail, with min difficulty. (Not met, progressing)  5. Pt to demonstrate improved functional ability with FOTO limitation <=30% disability. (Not met, progressing)    PLAN   Plan of care Certification: 12/15/2022 to 3/15/2023.    Improve B LE strength and balance     Outpatient Physical Therapy 2 times weekly for 12 weeks to include the following interventions: Aquatic  Therapy, Cervical/Lumbar Traction, Electrical Stimulation with dry needling prn, Gait Training, Iontophoresis (with dexamethasone prn), Manual Therapy, Moist Heat/ Ice, Neuromuscular Re-ed, Orthotic Management and Training, Patient Education, Self Care, Therapeutic Activities, and Therapeutic Exercise.  Progress HEP towards D/C. Recommend F/U with MD if symptoms worsen or do not resolve      Dickson Child III, PTA

## 2023-01-17 ENCOUNTER — CLINICAL SUPPORT (OUTPATIENT)
Dept: REHABILITATION | Facility: OTHER | Age: 88
End: 2023-01-17
Payer: MEDICARE

## 2023-01-17 DIAGNOSIS — G89.29 CHRONIC LEFT-SIDED LOW BACK PAIN WITHOUT SCIATICA: ICD-10-CM

## 2023-01-17 DIAGNOSIS — R53.1 DECREASED RANGE OF MOTION WITH DECREASED STRENGTH: ICD-10-CM

## 2023-01-17 DIAGNOSIS — M25.60 DECREASED RANGE OF MOTION WITH DECREASED STRENGTH: ICD-10-CM

## 2023-01-17 DIAGNOSIS — M54.50 CHRONIC LEFT-SIDED LOW BACK PAIN WITHOUT SCIATICA: ICD-10-CM

## 2023-01-17 DIAGNOSIS — Z74.09 IMPAIRED FUNCTIONAL MOBILITY, BALANCE, GAIT, AND ENDURANCE: Primary | ICD-10-CM

## 2023-01-17 PROCEDURE — 97110 THERAPEUTIC EXERCISES: CPT | Mod: PN

## 2023-01-17 PROCEDURE — 97530 THERAPEUTIC ACTIVITIES: CPT | Mod: PN

## 2023-01-17 NOTE — PROGRESS NOTES
"[facility]   Physical Therapy Treatment Note     Name: Sonja Weeks  Clinic Number: [id number]    Therapy Diagnosis:   Encounter Diagnoses   Name Primary?    Impaired functional mobility, balance, gait, and endurance Yes    Decreased range of motion with decreased strength     Chronic left-sided low back pain without sciatica          Physician: Brennan Vazquez MD    Visit Date: 1/17/2023    Physician Orders: PT Eval and Treat   Medical Diagnosis from Referral: M15.9 (ICD-10-CM) - Primary osteoarthritis involving multiple joints   Evaluation Date: 12/15/2022  Authorization Period Expiration: 12/31/2022   Plan of Care Expiration: 3/15/2023  Progress Note Due: 1/15/2023  Visit # / Visits authorized: 5/ 20 (6 total)  FOTO: 1/3  PTA Visit #: 0/ 5    Precautions: Standard, cancer, and HTN, scoliosis, osteoporosis, R TKA, left knee arthroscopy, left reverse shoulder replacement     Time In: 10:30am  Time Out: 11:15 am  Total Billable Time: 45 minutes    SUBJECTIVE   Pt reports: going to exercise class in her building 3x/week that includes resistance band exercises and balance. "On the days that I'm not in there I'm lazy and don't do anything." Pt denies any change or improvement in LBP after dry needling.    She was compliant with home exercise program.  Response to previous treatment: "I felt ok"  Functional change: None today    Pain: 6-7/10  Location: L shoulder, L side of mid-low back, L ankle    OBJECTIVE     See eval on 12/15/2022 for updated tests/measurements.     TREATMENT     Sonja received the bolded treatments listed below:      Patient received therapeutic exercises for 25 minutes for improved strength and AROM including:  LTR x 2x10 AROM  DKTC with PB x 2'  resisted LTR x 2' - manual perturbations with PT assist today  Bridges on ball 2x10  SKTC x 10  andrew cane flex, SA punches x 20 ea  Bridges + glute set x 2 min   SAQ 2 x 10  Supine ball squeeze x 20  SLR with pilates " "ring press down 2x 10 ea    Pt edu on importance of compliance, consistency with HEP, modifications x 10'    Patient received manual therapeutic technique for 00 minutes for improved soft tissue and joint mobility including:    Application of TDN: Pt educated on benefits and potential side effects of dry needling. Educated pt on benefits, precautions, side effects following TDN. Educated pt to use heat following treatment sessions if pt is experiencing pain or soreness. Pt verbalized good understanding of education.  Pt signed written consent to dry needling. Pt gave verbal consent for DN    Pt received dry needling to the below listed muscles using 60 mm needles.  Left L3-5 multifidi    With application of electrical stimulation treatment    Pt tolerated treatment well with no adverse events noted.        Patient received neuromuscular reeducation for 00 minutes for improved proprioception and balance including:  +Forward walking in \\ bars (without AD)  +Lateral walking in \\ bars (without AD)  +NBOS on foam 3 x 30"      Patient received therapeutic activities for 20 minutes for improved tolerance to functional activities including:  sit to stands (standard height chair): 1x 15  Step ups on 4" step in \\ bars x 15 fwd, x 15 lat - limited fwd on L 2* c/o knee pain  Tandem walk in // bars 5 laps  Standing marching x20  Side stepping 5 laps        PATIENT EDUCATION AND HOME EXERCISES     Home Exercises Provided and Patient Education Provided     Education provided:   PT educated pt on importance of compliance with their HEP this visit.   1/17/23 - updated HEP, GTB given    Written Home Exercises Provided: Patient instructed to cont prior HEP. Exercises were reviewed and Sonja was able to demonstrate them prior to the end of the session.  Sonja demonstrated fair  understanding of the education provided. See EMR under Patient Instructions for exercises provided during therapy sessions    ASSESSMENT     As pt " verbalized not keeping up with previously given HEP, and does not do exercises on days that she is not in exercise class. Updated HEP to improve compliance and consistency daily, to include LTR for global spine mobility, sit to stands for LE strength, CV endurance, and dynamic balance; standing rows with emphasis on scapular retractions to promote postural alignment, endurance, strength. Good tolerance with exercises and demo's good return technique and able to verbalize all exercise to be done. Green TB given today. Heavy emphasis on consistency, compliance to promote overall strength, posture, balance for improved independence with functional mobility.    Dry needling deferred today due to time constraints and pt edu. Consider resuming dry needling due to good results after last visit.  We will continue to progress core strength and functional ability while monitoring her low back pain.     Sonja Is progressing well towards her goals.   Pt prognosis is Good.     Pt will continue to benefit from skilled outpatient physical therapy to address the deficits listed in the problem list box on initial evaluation, provide pt/family education and to maximize pt's level of independence in the home and community environment.     Pt's spiritual, cultural and educational needs considered and pt agreeable to plan of care and goals.     Anticipated barriers to physical therapy: None    Goals:   Short Term Goals (6 Weeks):  1. Pt able to stand >=30 minutes with ADLs with mod difficulty. (Not met, progressing)  2. Pt able to walk >=30 minutes with household chores with mod difficulty. (Not met, progressing)  3.  Pt able to transfer in/out of chairs 8x in 30 seconds as demonstrating improving functional strength. (Not met, progressing)  4. Pt able to ascend/descend curb, independently, 1 handrail, with mod difficulty. (Not met, progressing)  5. Pt to demonstrate improved functional ability with FOTO limitation <=39% disability.  (Not met, progressing)     Long Term Goals (12 Weeks):  1. Pt able to stand >=30 minutes with ADLs with min difficulty. (Not met, progressing)  2. Pt able to walk >=30 minutes with household chores with min difficulty. (Not met, progressing)  3.  Pt able to transfer in/out of chairs 12x in 30 seconds as demonstrating improving functional strength. (Not met, progressing)  4. Pt able to ascend/descend 1 flight of stairs, independently, 1 handrail, with min difficulty. (Not met, progressing)  5. Pt to demonstrate improved functional ability with FOTO limitation <=30% disability. (Not met, progressing)    PLAN   Plan of care Certification: 12/15/2022 to 3/15/2023.    Improve B LE strength and balance     Outpatient Physical Therapy 2 times weekly for 12 weeks to include the following interventions: Aquatic Therapy, Cervical/Lumbar Traction, Electrical Stimulation with dry needling prn, Gait Training, Iontophoresis (with dexamethasone prn), Manual Therapy, Moist Heat/ Ice, Neuromuscular Re-ed, Orthotic Management and Training, Patient Education, Self Care, Therapeutic Activities, and Therapeutic Exercise.  Progress HEP towards D/C. Recommend F/U with MD if symptoms worsen or do not resolve      Jing Menchaca III, PTA

## 2023-01-19 ENCOUNTER — CLINICAL SUPPORT (OUTPATIENT)
Dept: REHABILITATION | Facility: OTHER | Age: 88
End: 2023-01-19
Payer: MEDICARE

## 2023-01-19 DIAGNOSIS — R53.1 DECREASED RANGE OF MOTION WITH DECREASED STRENGTH: ICD-10-CM

## 2023-01-19 DIAGNOSIS — M25.60 DECREASED RANGE OF MOTION WITH DECREASED STRENGTH: ICD-10-CM

## 2023-01-19 DIAGNOSIS — Z74.09 IMPAIRED FUNCTIONAL MOBILITY, BALANCE, GAIT, AND ENDURANCE: Primary | ICD-10-CM

## 2023-01-19 DIAGNOSIS — G89.29 CHRONIC LEFT-SIDED LOW BACK PAIN WITHOUT SCIATICA: ICD-10-CM

## 2023-01-19 DIAGNOSIS — M54.50 CHRONIC LEFT-SIDED LOW BACK PAIN WITHOUT SCIATICA: ICD-10-CM

## 2023-01-19 PROBLEM — M81.0 AGE-RELATED OSTEOPOROSIS WITHOUT CURRENT PATHOLOGICAL FRACTURE: Status: ACTIVE | Noted: 2023-01-19

## 2023-01-19 PROCEDURE — 97140 MANUAL THERAPY 1/> REGIONS: CPT | Mod: PN

## 2023-01-19 PROCEDURE — 97110 THERAPEUTIC EXERCISES: CPT | Mod: PN

## 2023-01-19 PROCEDURE — 97530 THERAPEUTIC ACTIVITIES: CPT | Mod: PN

## 2023-01-19 NOTE — PROGRESS NOTES
"[facility]   Physical Therapy Treatment Note     Name: Sonja Weeks  Clinic Number: [id number]    Therapy Diagnosis:   Encounter Diagnoses   Name Primary?    Impaired functional mobility, balance, gait, and endurance Yes    Decreased range of motion with decreased strength     Chronic left-sided low back pain without sciatica            Physician: Brennan Vazquez MD    Visit Date: 1/19/2023    Physician Orders: PT Eval and Treat   Medical Diagnosis from Referral: M15.9 (ICD-10-CM) - Primary osteoarthritis involving multiple joints   Evaluation Date: 12/15/2022  Authorization Period Expiration: 12/31/2022   Plan of Care Expiration: 3/15/2023  Progress Note Due: 1/15/2023  Visit # / Visits authorized: 5/ 20 (6 total)  FOTO: 1/3  PTA Visit #: 0/ 5    Precautions: Standard, cancer, and HTN, scoliosis, osteoporosis, R TKA, left knee arthroscopy, left reverse shoulder replacement     Time In: 10:30am  Time Out: 11:15 am  Total Billable Time: 45 minutes    SUBJECTIVE   Pt reports: going to exercise class in her building 3x/week that includes resistance band exercises and balance. "On the days that I'm not in there I'm lazy and don't do anything." Pt denies any change or improvement in LBP after dry needling.    She was compliant with home exercise program.  Response to previous treatment: "I felt ok"  Functional change: None today    Pain: 6-7/10  Location: L shoulder, L side of mid-low back, L ankle    OBJECTIVE     See eval on 12/15/2022 for updated tests/measurements.     TREATMENT     Sonja received the bolded treatments listed below:      Patient received therapeutic exercises for 15 minutes for improved strength and AROM including:  LTR x 2x10 AROM  DKTC with PB x 2'  resisted LTR x 2' - manual perturbations with PT assist today  Bridges on ball 2x10  SKTC x 10  andrew cane flex, SA punches x 20 ea  Bridges + glute set x 2 min   SAQ 2 x 10  Supine ball squeeze x 20  SLR with pilates " "ring press down 2x 10 ea      Patient received manual therapeutic technique for 15 minutes for improved soft tissue and joint mobility including:    Application of TDN: Pt educated on benefits and potential side effects of dry needling. Educated pt on benefits, precautions, side effects following TDN. Educated pt to use heat following treatment sessions if pt is experiencing pain or soreness. Pt verbalized good understanding of education.  Pt signed written consent to dry needling. Pt gave verbal consent for DN    Pt received dry needling to the below listed muscles using 60 mm needles.  Left L3-5 multifidi  +L QL  +L glute max    With application of electrical stimulation treatment    Pt tolerated treatment well with no adverse events noted.      Patient received neuromuscular reeducation for 00 minutes for improved proprioception and balance including:  +Forward walking in \\ bars (without AD)  +Lateral walking in \\ bars (without AD)  +NBOS on foam 3 x 30"      Patient received therapeutic activities for 15 minutes for improved tolerance to functional activities including:  sit to stands (standard height chair): 1x 15  Step ups on 4" step in \\ bars x 15 fwd, x 15 lat - limited fwd on L 2* c/o knee pain  Tandem walk in // bars 5 laps  Standing marching x20  Side stepping 5 laps        PATIENT EDUCATION AND HOME EXERCISES     Home Exercises Provided and Patient Education Provided     Education provided:   PT educated pt on importance of compliance with their HEP this visit.   1/17/23 - updated HEP, GTB given    Written Home Exercises Provided: Patient instructed to cont prior HEP. Exercises were reviewed and Sonja was able to demonstrate them prior to the end of the session.  Sonja demonstrated fair  understanding of the education provided. See EMR under Patient Instructions for exercises provided during therapy sessions    ASSESSMENT     Resumed dry needling per pt request due to min change or improvement in " sx over past few visit. Pt presents with increased mm tightness and increased tone to L lumbar paraspinals, QL. Winding technique used every 5 minutes with good tolerance and marked improvement in mm tone, pain modulation.     We will continue to progress core strength and functional ability while monitoring her low back pain.     Sonja Is progressing well towards her goals.   Pt prognosis is Good.     Pt will continue to benefit from skilled outpatient physical therapy to address the deficits listed in the problem list box on initial evaluation, provide pt/family education and to maximize pt's level of independence in the home and community environment.     Pt's spiritual, cultural and educational needs considered and pt agreeable to plan of care and goals.     Anticipated barriers to physical therapy: None    Goals:   Short Term Goals (6 Weeks):  1. Pt able to stand >=30 minutes with ADLs with mod difficulty. (Not met, progressing)  2. Pt able to walk >=30 minutes with household chores with mod difficulty. (Not met, progressing)  3.  Pt able to transfer in/out of chairs 8x in 30 seconds as demonstrating improving functional strength. (Not met, progressing)  4. Pt able to ascend/descend curb, independently, 1 handrail, with mod difficulty. (Not met, progressing)  5. Pt to demonstrate improved functional ability with FOTO limitation <=39% disability. (Not met, progressing)     Long Term Goals (12 Weeks):  1. Pt able to stand >=30 minutes with ADLs with min difficulty. (Not met, progressing)  2. Pt able to walk >=30 minutes with household chores with min difficulty. (Not met, progressing)  3.  Pt able to transfer in/out of chairs 12x in 30 seconds as demonstrating improving functional strength. (Not met, progressing)  4. Pt able to ascend/descend 1 flight of stairs, independently, 1 handrail, with min difficulty. (Not met, progressing)  5. Pt to demonstrate improved functional ability with FOTO limitation <=30%  disability. (Not met, progressing)    PLAN   Plan of care Certification: 12/15/2022 to 3/15/2023.    Improve B LE strength and balance     Outpatient Physical Therapy 2 times weekly for 12 weeks to include the following interventions: Aquatic Therapy, Cervical/Lumbar Traction, Electrical Stimulation with dry needling prn, Gait Training, Iontophoresis (with dexamethasone prn), Manual Therapy, Moist Heat/ Ice, Neuromuscular Re-ed, Orthotic Management and Training, Patient Education, Self Care, Therapeutic Activities, and Therapeutic Exercise.  Progress HEP towards D/C. Recommend F/U with MD if symptoms worsen or do not resolve      Jing Menchaca III, PTA

## 2023-01-24 ENCOUNTER — CLINICAL SUPPORT (OUTPATIENT)
Dept: REHABILITATION | Facility: OTHER | Age: 88
End: 2023-01-24
Payer: MEDICARE

## 2023-01-24 DIAGNOSIS — M54.50 CHRONIC LEFT-SIDED LOW BACK PAIN WITHOUT SCIATICA: ICD-10-CM

## 2023-01-24 DIAGNOSIS — M25.60 DECREASED RANGE OF MOTION WITH DECREASED STRENGTH: ICD-10-CM

## 2023-01-24 DIAGNOSIS — G89.29 CHRONIC LEFT-SIDED LOW BACK PAIN WITHOUT SCIATICA: ICD-10-CM

## 2023-01-24 DIAGNOSIS — Z74.09 IMPAIRED FUNCTIONAL MOBILITY, BALANCE, GAIT, AND ENDURANCE: Primary | ICD-10-CM

## 2023-01-24 DIAGNOSIS — R53.1 DECREASED RANGE OF MOTION WITH DECREASED STRENGTH: ICD-10-CM

## 2023-01-24 PROCEDURE — 97530 THERAPEUTIC ACTIVITIES: CPT | Mod: PN

## 2023-01-24 PROCEDURE — 97110 THERAPEUTIC EXERCISES: CPT | Mod: PN

## 2023-01-24 NOTE — PROGRESS NOTES
"[facility]   Physical Therapy Treatment Note     Name: Sonja Weeks  Clinic Number: [id number]    Therapy Diagnosis:   Encounter Diagnoses   Name Primary?    Impaired functional mobility, balance, gait, and endurance Yes    Decreased range of motion with decreased strength     Chronic left-sided low back pain without sciatica              Physician: Brennan Vazquez MD    Visit Date: 1/24/2023    Physician Orders: PT Eval and Treat   Medical Diagnosis from Referral: M15.9 (ICD-10-CM) - Primary osteoarthritis involving multiple joints   Evaluation Date: 12/15/2022  Authorization Period Expiration: 12/31/2022   Plan of Care Expiration: 3/15/2023  Progress Note Due: 1/15/2023  Visit # / Visits authorized: 5/ 20 (6 total)  FOTO: 1/3  PTA Visit #: 0/ 5    Precautions: Standard, cancer, and HTN, scoliosis, osteoporosis, R TKA, left knee arthroscopy, left reverse shoulder replacement     Time In: 10:37am  Time Out: 11:15 am  Total Billable Time: 38 minutes    SUBJECTIVE   Pt reports: "the dry needling did nothing for me. I don't feel any better after."    She was compliant with home exercise program.  Response to previous treatment: "I felt ok"  Functional change: None today. Continued pain and difficulty with L low back and side, difficulty achieving upright posture    Pain: 6-7/10  Location: L shoulder, L side of mid-low back, L ankle    OBJECTIVE     See eval on 12/15/2022 for updated tests/measurements.     TREATMENT     Sonja received the bolded treatments listed below:      Patient received therapeutic exercises for 30 minutes for improved strength and AROM including:  LTR x 2x10 AROM  DKTC with PB x 2'  resisted LTR x 2' - manual perturbations with PT assist today  Bridges on ball 2x10  SKTC x 10  morgan cane flex, SA punches x 20 ea  Bridges + glute set x 2 min   SAQ 2 x 10  Supine ball squeeze x 20  SLR with pilates ring press down 2x 10 ea    +seated (1/2 foam along spine)   Morgan ER " "2 x 10 x YTB   Morgan Abd 2 x 10 x YTB   OH lift with red wt ball (fwd, diagonals) x 10 ea   Seated trunk rotations 2 x 5 ea x YTB   Seated rows x 2 x 10 x GTB    Patient received manual therapeutic technique for 00 minutes for improved soft tissue and joint mobility including:  None today.  See note on 1/19/2023 for last dry needling note prn.    Patient received neuromuscular reeducation for 00 minutes for improved proprioception and balance including:  +Forward walking in \\ bars (without AD)  +Lateral walking in \\ bars (without AD)  +NBOS on foam 3 x 30"      Patient received therapeutic activities for 8 minutes for improved tolerance to functional activities including:  sit to stands (standard height chair + airex): 1x 10 AROM, 1 x 10 x pilates block between knees  Step ups on 4" step in \\ bars x 15 fwd, x 15 lat - limited fwd on L 2* c/o knee pain  Tandem walk in // bars 5 laps  Standing marching x20  Side stepping 5 laps        PATIENT EDUCATION AND HOME EXERCISES     Home Exercises Provided and Patient Education Provided     Education provided:   PT educated pt on importance of compliance with their HEP this visit.   1/17/23 - updated HEP, GTB given    Written Home Exercises Provided: Patient instructed to cont prior HEP. Exercises were reviewed and Sonja was able to demonstrate them prior to the end of the session.  Sonja demonstrated fair  understanding of the education provided. See EMR under Patient Instructions for exercises provided during therapy sessions    ASSESSMENT     Defer dry needling today as pt notes it did not help with sx. Modified program today with emphasis on progression of scapular and postural strengthening today. Good tolerance but pt quick to fatigue with OH resisted lifting due to c/o BUE weakness. Heavy VC for upright posture, with 1/2 foam added for TC, for appropriate mm activation. Good tolerance overall with appropriate training effect achieved.    We will continue to " progress core strength and functional ability while monitoring her low back pain.   Consider referral to [facility] for chronic LBP if no longer progressing.    Sonja Is progressing well towards her goals.   Pt prognosis is Good.     Pt will continue to benefit from skilled outpatient physical therapy to address the deficits listed in the problem list box on initial evaluation, provide pt/family education and to maximize pt's level of independence in the home and community environment.     Pt's spiritual, cultural and educational needs considered and pt agreeable to plan of care and goals.     Anticipated barriers to physical therapy: None    Goals:   Short Term Goals (6 Weeks):  1. Pt able to stand >=30 minutes with ADLs with mod difficulty. (Not met, progressing)  2. Pt able to walk >=30 minutes with household chores with mod difficulty. (Not met, progressing)  3.  Pt able to transfer in/out of chairs 8x in 30 seconds as demonstrating improving functional strength. (Not met, progressing)  4. Pt able to ascend/descend curb, independently, 1 handrail, with mod difficulty. (Not met, progressing)  5. Pt to demonstrate improved functional ability with FOTO limitation <=39% disability. (Not met, progressing)     Long Term Goals (12 Weeks):  1. Pt able to stand >=30 minutes with ADLs with min difficulty. (Not met, progressing)  2. Pt able to walk >=30 minutes with household chores with min difficulty. (Not met, progressing)  3.  Pt able to transfer in/out of chairs 12x in 30 seconds as demonstrating improving functional strength. (Not met, progressing)  4. Pt able to ascend/descend 1 flight of stairs, independently, 1 handrail, with min difficulty. (Not met, progressing)  5. Pt to demonstrate improved functional ability with FOTO limitation <=30% disability. (Not met, progressing)    PLAN   Plan of care Certification: 12/15/2022 to 3/15/2023.    Improve B LE strength and balance        Jing Menchaca

## 2023-01-26 ENCOUNTER — CLINICAL SUPPORT (OUTPATIENT)
Dept: REHABILITATION | Facility: OTHER | Age: 88
End: 2023-01-26
Payer: MEDICARE

## 2023-01-26 DIAGNOSIS — G89.29 CHRONIC LEFT-SIDED LOW BACK PAIN WITHOUT SCIATICA: ICD-10-CM

## 2023-01-26 DIAGNOSIS — M54.50 CHRONIC LEFT-SIDED LOW BACK PAIN WITHOUT SCIATICA: ICD-10-CM

## 2023-01-26 DIAGNOSIS — R53.1 DECREASED RANGE OF MOTION WITH DECREASED STRENGTH: ICD-10-CM

## 2023-01-26 DIAGNOSIS — Z74.09 IMPAIRED FUNCTIONAL MOBILITY, BALANCE, GAIT, AND ENDURANCE: Primary | ICD-10-CM

## 2023-01-26 DIAGNOSIS — M25.60 DECREASED RANGE OF MOTION WITH DECREASED STRENGTH: ICD-10-CM

## 2023-01-26 PROCEDURE — 97110 THERAPEUTIC EXERCISES: CPT | Mod: PN

## 2023-01-26 PROCEDURE — 97530 THERAPEUTIC ACTIVITIES: CPT | Mod: PN

## 2023-01-26 NOTE — PROGRESS NOTES
"[facility]   Physical Therapy Treatment Note     Name: Sonja Weeks  Clinic Number: [id number]    Therapy Diagnosis:   Encounter Diagnoses   Name Primary?    Impaired functional mobility, balance, gait, and endurance Yes    Decreased range of motion with decreased strength     Chronic left-sided low back pain without sciatica                Physician: Brennan Vazquez MD    Visit Date: 1/26/2023    Physician Orders: PT Eval and Treat   Medical Diagnosis from Referral: M15.9 (ICD-10-CM) - Primary osteoarthritis involving multiple joints   Evaluation Date: 12/15/2022  Authorization Period Expiration: 12/31/2022   Plan of Care Expiration: 3/15/2023  Progress Note Due: 1/15/2023  Visit # / Visits authorized: 5/ 20 (6 total)  FOTO: 1/3  PTA Visit #: 0/ 5    Precautions: Standard, cancer, and HTN, scoliosis, osteoporosis, R TKA, left knee arthroscopy, left reverse shoulder replacement     Time In: 11:15am  Time Out: 12:00 am  Total Billable Time: 40 minutes 1:1    SUBJECTIVE   Pt reports: no new complaints since the previous visit.    She was compliant with home exercise program.  Response to previous treatment: "I felt ok"  Functional change: None today. Continued pain and difficulty with L low back and side, difficulty achieving upright posture    Pain: 6-7/10  Location: L shoulder, L side of mid-low back, L ankle    OBJECTIVE     See eval on 12/15/2022 for updated tests/measurements.     TREATMENT     Sonja received the bolded treatments listed below:      Patient received therapeutic exercises for 30 minutes for improved strength and AROM including:  LTR x 2x10 AROM  DKTC with PB x 2'  resisted LTR x 2' - manual perturbations with PT assist today  Bridges on ball 2x10  SKTC x 10  morgan cane flex, SA punches x 20 ea  Bridges + glute set x 2 min   SAQ 2 x 10  Supine ball squeeze x 20  SLR with pilates ring press down 2x 10 ea    seated (1/2 foam along spine)   Morgan ER 2 x 10 x YTB   Morgan " "Abd 2 x 10 x YTB   OH lift with red wt ball (fwd, diagonals) x 10 ea   Seated trunk rotations 2 x 5 ea x YTB   Seated rows x 2 x 10 x GTB    Patient received manual therapeutic technique for 00 minutes for improved soft tissue and joint mobility including:  None today.  See note on 1/19/2023 for last dry needling note prn.    Patient received neuromuscular reeducation for 00 minutes for improved proprioception and balance including:  +Forward walking in \\ bars (without AD)  +Lateral walking in \\ bars (without AD)  +NBOS on foam 3 x 30"      Patient received therapeutic activities for 10 minutes for improved tolerance to functional activities including:  sit to stands (standard height chair + airex): 1x 10 AROM, 1 x 10 x pilates block between knees  Step ups on 4" step in \\ bars x 15 fwd, x 15 lat - limited fwd on L 2* c/o knee pain  Tandem walk in // bars 5 laps  Standing marching x20  Side stepping 5 laps        PATIENT EDUCATION AND HOME EXERCISES     Home Exercises Provided and Patient Education Provided     Education provided:   PT educated pt on importance of compliance with their HEP this visit.   1/17/23 - updated HEP, GTB given    Written Home Exercises Provided: Patient instructed to cont prior HEP. Exercises were reviewed and Sonja was able to demonstrate them prior to the end of the session.  Sonja demonstrated fair  understanding of the education provided. See EMR under Patient Instructions for exercises provided during therapy sessions    ASSESSMENT     Defer dry needling today as pt notes it did not help with sx. Modified program today with emphasis on progression of scapular and postural strengthening today. Good tolerance but pt quick to fatigue with OH resisted lifting due to c/o BUE weakness. Heavy VC for upright posture, with 1/2 foam added for TC, for appropriate mm activation. Good tolerance overall with appropriate training effect achieved.    We will continue to progress core strength " and functional ability while monitoring her low back pain.   Consider referral to [facility] for chronic LBP if no longer progressing.    Sonja Is progressing well towards her goals.   Pt prognosis is Good.     Pt will continue to benefit from skilled outpatient physical therapy to address the deficits listed in the problem list box on initial evaluation, provide pt/family education and to maximize pt's level of independence in the home and community environment.     Pt's spiritual, cultural and educational needs considered and pt agreeable to plan of care and goals.     Anticipated barriers to physical therapy: None    Goals:   Short Term Goals (6 Weeks):  1. Pt able to stand >=30 minutes with ADLs with mod difficulty. (Not met, progressing)  2. Pt able to walk >=30 minutes with household chores with mod difficulty. (Not met, progressing)  3.  Pt able to transfer in/out of chairs 8x in 30 seconds as demonstrating improving functional strength. (Not met, progressing)  4. Pt able to ascend/descend curb, independently, 1 handrail, with mod difficulty. (Not met, progressing)  5. Pt to demonstrate improved functional ability with FOTO limitation <=39% disability. (Not met, progressing)     Long Term Goals (12 Weeks):  1. Pt able to stand >=30 minutes with ADLs with min difficulty. (Not met, progressing)  2. Pt able to walk >=30 minutes with household chores with min difficulty. (Not met, progressing)  3.  Pt able to transfer in/out of chairs 12x in 30 seconds as demonstrating improving functional strength. (Not met, progressing)  4. Pt able to ascend/descend 1 flight of stairs, independently, 1 handrail, with min difficulty. (Not met, progressing)  5. Pt to demonstrate improved functional ability with FOTO limitation <=30% disability. (Not met, progressing)    PLAN   Plan of care Certification: 12/15/2022 to 3/15/2023.    Improve B LE strength and balance        Jing Menchaca

## 2023-01-30 ENCOUNTER — CLINICAL SUPPORT (OUTPATIENT)
Dept: REHABILITATION | Facility: OTHER | Age: 88
End: 2023-01-30
Payer: MEDICARE

## 2023-01-30 DIAGNOSIS — G89.29 CHRONIC LEFT-SIDED LOW BACK PAIN WITHOUT SCIATICA: ICD-10-CM

## 2023-01-30 DIAGNOSIS — R53.1 DECREASED RANGE OF MOTION WITH DECREASED STRENGTH: ICD-10-CM

## 2023-01-30 DIAGNOSIS — M25.60 DECREASED RANGE OF MOTION WITH DECREASED STRENGTH: ICD-10-CM

## 2023-01-30 DIAGNOSIS — Z74.09 IMPAIRED FUNCTIONAL MOBILITY, BALANCE, GAIT, AND ENDURANCE: Primary | ICD-10-CM

## 2023-01-30 DIAGNOSIS — M54.50 CHRONIC LEFT-SIDED LOW BACK PAIN WITHOUT SCIATICA: ICD-10-CM

## 2023-01-30 PROCEDURE — 97110 THERAPEUTIC EXERCISES: CPT | Mod: PN

## 2023-01-30 PROCEDURE — 97530 THERAPEUTIC ACTIVITIES: CPT | Mod: PN

## 2023-01-30 NOTE — PROGRESS NOTES
"[facility]   Physical Therapy Treatment Note      Name: Sonja Weeks  Clinic Number: [id number]     Therapy Diagnosis:        Encounter Diagnoses   Name Primary?    Impaired functional mobility, balance, gait, and endurance Yes    Decreased range of motion with decreased strength      Chronic left-sided low back pain without sciatica           Physician: Brennan Vazquez MD     Visit Date: 1/30/2023     Physician Orders: PT Eval and Treat   Medical Diagnosis from Referral: M15.9 (ICD-10-CM) - Primary osteoarthritis involving multiple joints   Evaluation Date: 12/15/2022  Authorization Period Expiration: 12/31/2022   Plan of Care Expiration: 3/15/2023  Progress Note Due: 1/15/2023  Visit # / Visits authorized: 6/ 20 (6 total)  FOTO: 1/3  PTA Visit #: 0/ 5     Precautions: Standard, cancer, and HTN, scoliosis, osteoporosis, R TKA, left knee arthroscopy, left reverse shoulder replacement      Time In: 1:15 pm  Time Out: 1:58 pm  Total Billable Time: 43 minutes     SUBJECTIVE   Pt reports: reports that her back is really bothering her today, more than usual.      She was compliant with home exercise program.  Response to previous treatment: "I felt ok"  Functional change: None today. Continued pain and difficulty with L low back and side, difficulty achieving upright posture     Pain: 6/10  Location: L shoulder, L side of mid-low back      OBJECTIVE      See eval on 12/15/2022 for updated tests/measurements.     TREATMENT      Sonja received the bolded treatments listed below:       Patient received therapeutic exercises for 35 minutes for improved strength and AROM including:  LTR x 2x10 AROM  DKTC with PB x 2'  resisted LTR x 2' - manual perturbations with PT assist today  Bridges on ball 2x10  SKTC x 10  Piriformis stretch (ezequiel and modified) 10 sec x 3   andrew cane flex, SA punches x 20 ea  Bridges + glute set x 2 min   SIDE LYING open books 2 x 10   Clamshells 2 x 10   Seated hip " "abduction isometrics with pilates ring, 5 sec holds 2 x 10   SAQ 2 x 10  Supine ball squeeze x 20  SLR with pilates ring press down 2x 10 ea     +seated (1/2 foam along spine)              Morgan ER 2 x 10 x GTB              Morgan Abd 2 x 10 x GTB              OH lift with red wt ball (fwd, diagonals) x 10 ea              Seated trunk rotations 2 x 5 ea x YTB              Seated rows x 2 x 10 x GTB     Patient received manual therapeutic technique for 00 minutes for improved soft tissue and joint mobility including:  None today.  See note on 1/19/2023 for last dry needling note prn.     Patient received neuromuscular reeducation for 00 minutes for improved proprioception and balance including:  +Forward walking in \\ bars (without AD)  +Lateral walking in \\ bars (without AD)  +NBOS on foam 3 x 30"        Patient received therapeutic activities for 8 minutes for improved tolerance to functional activities including:  sit to stands (standard height chair + airex): 1x 10 AROM, 1 x 10 x pilates block between knees -->HHA for ascending, no HHA descending   Step ups on 4" step in \\ bars x 15 fwd, x 15 lat - limited fwd on L 2* c/o knee pain  Tandem walk in // bars 5 laps  Standing marching x20  Side stepping 5 laps           PATIENT EDUCATION AND HOME EXERCISES      Home Exercises Provided and Patient Education Provided      Education provided:   PT educated pt on importance of compliance with their HEP this visit.   1/17/23 - updated HEP, GTB given     Written Home Exercises Provided: Patient instructed to cont prior HEP. Exercises were reviewed and Sonja was able to demonstrate them prior to the end of the session.  Sonja demonstrated fair  understanding of the education provided. See EMR under Patient Instructions for exercises provided during therapy sessions     ASSESSMENT   Patient reports increased pain today in left lumbar spine, therefore, focused on increasing range of motion, mobility, flexibility, and " decreasing symptoms prior to performing exercises. Patient reports significant relief with LTR, open books, and piriformis stretch. Continued with general BLE strengthening with increased focus on bilateral gluteus medius musculature and gluteals for improved stability in posterior chain. Patient demonstrates significant difficulty performing SIT TO STAND from elevated seat, therefore, performed with HHA during ascent and no HHA for descent focusing on eccentric control. Patient requires verbal cues throughout all exercises for proper positioning and performance.      We will continue to progress core strength and functional ability while monitoring her low back pain.   Consider referral to [facility] for chronic LBP if no longer progressing.     Sonja Is progressing well towards her goals.   Pt prognosis is Good.      Pt will continue to benefit from skilled outpatient physical therapy to address the deficits listed in the problem list box on initial evaluation, provide pt/family education and to maximize pt's level of independence in the home and community environment.      Pt's spiritual, cultural and educational needs considered and pt agreeable to plan of care and goals.     Anticipated barriers to physical therapy: None     Goals:   Short Term Goals (6 Weeks):  1. Pt able to stand >=30 minutes with ADLs with mod difficulty. (Not met, progressing)  2. Pt able to walk >=30 minutes with household chores with mod difficulty. (Not met, progressing)  3.  Pt able to transfer in/out of chairs 8x in 30 seconds as demonstrating improving functional strength. (Not met, progressing)  4. Pt able to ascend/descend curb, independently, 1 handrail, with mod difficulty. (Not met, progressing)  5. Pt to demonstrate improved functional ability with FOTO limitation <=39% disability. (Not met, progressing)     Long Term Goals (12 Weeks):  1. Pt able to stand >=30 minutes with ADLs with min difficulty. (Not met, progressing)  2. Pt able  to walk >=30 minutes with household chores with min difficulty. (Not met, progressing)  3.  Pt able to transfer in/out of chairs 12x in 30 seconds as demonstrating improving functional strength. (Not met, progressing)  4. Pt able to ascend/descend 1 flight of stairs, independently, 1 handrail, with min difficulty. (Not met, progressing)  5. Pt to demonstrate improved functional ability with FOTO limitation <=30% disability. (Not met, progressing)     PLAN   Plan of care Certification: 12/15/2022 to 3/15/2023.     Improve B LE strength and balance           Sade Quigley

## 2023-01-30 NOTE — PROGRESS NOTES
"[facility]   Physical Therapy Treatment Note     Name: Sonja Weeks  Clinic Number: [id number]    Therapy Diagnosis:   Encounter Diagnoses   Name Primary?    Impaired functional mobility, balance, gait, and endurance Yes    Decreased range of motion with decreased strength     Chronic left-sided low back pain without sciatica                Physician: Brennan Vazquez MD    Visit Date: 1/30/2023    Physician Orders: PT Eval and Treat   Medical Diagnosis from Referral: M15.9 (ICD-10-CM) - Primary osteoarthritis involving multiple joints   Evaluation Date: 12/15/2022  Authorization Period Expiration: 12/31/2022   Plan of Care Expiration: 3/15/2023  Progress Note Due: 1/15/2023  Visit # / Visits authorized: 6/ 20 (6 total)  FOTO: 1/3  PTA Visit #: 0/ 5    Precautions: Standard, cancer, and HTN, scoliosis, osteoporosis, R TKA, left knee arthroscopy, left reverse shoulder replacement     Time In: 1:15 pm  Time Out: 1:58 pm  Total Billable Time: 43 minutes    SUBJECTIVE   Pt reports: reports that her back is really bothering her today, more than usual.     She was compliant with home exercise program.  Response to previous treatment: "I felt ok"  Functional change: None today. Continued pain and difficulty with L low back and side, difficulty achieving upright posture    Pain: 6/10  Location: L shoulder, L side of mid-low back     OBJECTIVE     See eval on 12/15/2022 for updated tests/measurements.     TREATMENT     Sonja received the bolded treatments listed below:      Patient received therapeutic exercises for 35 minutes for improved strength and AROM including:  LTR x 2x10 AROM  DKTC with PB x 2'  resisted LTR x 2' - manual perturbations with PT assist today  Bridges on ball 2x10  SKTC x 10  Piriformis stretch (ezequiel and modified) 10 sec x 3   andrew cane flex, SA punches x 20 ea  Bridges + glute set x 2 min   SIDE LYING open books 2 x 10   Clamshells 2 x 10   Seated hip abduction " "isometrics with pilates ring, 5 sec holds 2 x 10   SAQ 2 x 10  Supine ball squeeze x 20  SLR with pilates ring press down 2x 10 ea    +seated (1/2 foam along spine)   Morgan ER 2 x 10 x GTB   Morgan Abd 2 x 10 x GTB   OH lift with red wt ball (fwd, diagonals) x 10 ea   Seated trunk rotations 2 x 5 ea x YTB   Seated rows x 2 x 10 x GTB    Patient received manual therapeutic technique for 00 minutes for improved soft tissue and joint mobility including:  None today.  See note on 1/19/2023 for last dry needling note prn.    Patient received neuromuscular reeducation for 00 minutes for improved proprioception and balance including:  +Forward walking in \\ bars (without AD)  +Lateral walking in \\ bars (without AD)  +NBOS on foam 3 x 30"      Patient received therapeutic activities for 8 minutes for improved tolerance to functional activities including:  sit to stands (standard height chair + airex): 1x 10 AROM, 1 x 10 x pilates block between knees -->HHA for ascending, no HHA descending   Step ups on 4" step in \\ bars x 15 fwd, x 15 lat - limited fwd on L 2* c/o knee pain  Tandem walk in // bars 5 laps  Standing marching x20  Side stepping 5 laps        PATIENT EDUCATION AND HOME EXERCISES     Home Exercises Provided and Patient Education Provided     Education provided:   PT educated pt on importance of compliance with their HEP this visit.   1/17/23 - updated HEP, GTB given    Written Home Exercises Provided: Patient instructed to cont prior HEP. Exercises were reviewed and Sonja was able to demonstrate them prior to the end of the session.  Sonja demonstrated fair  understanding of the education provided. See EMR under Patient Instructions for exercises provided during therapy sessions    ASSESSMENT   Patient reports increased pain today in left lumbar spine, therefore, focused on increasing range of motion, mobility, flexibility, and decreasing symptoms prior to performing exercises. Patient reports significant " relief with LTR, open books, and piriformis stretch. Continued with general BLE strengthening with increased focus on bilateral gluteus medius musculature and gluteals for improved stability in posterior chain. Patient demonstrates significant difficulty performing SIT TO STAND from elevated seat, therefore, performed with HHA during ascent and no HHA for descent focusing on eccentric control. Patient requires verbal cues throughout all exercises for proper positioning and performance.     We will continue to progress core strength and functional ability while monitoring her low back pain.   Consider referral to [facility] for chronic LBP if no longer progressing.    Sonja Is progressing well towards her goals.   Pt prognosis is Good.     Pt will continue to benefit from skilled outpatient physical therapy to address the deficits listed in the problem list box on initial evaluation, provide pt/family education and to maximize pt's level of independence in the home and community environment.     Pt's spiritual, cultural and educational needs considered and pt agreeable to plan of care and goals.     Anticipated barriers to physical therapy: None    Goals:   Short Term Goals (6 Weeks):  1. Pt able to stand >=30 minutes with ADLs with mod difficulty. (Not met, progressing)  2. Pt able to walk >=30 minutes with household chores with mod difficulty. (Not met, progressing)  3.  Pt able to transfer in/out of chairs 8x in 30 seconds as demonstrating improving functional strength. (Not met, progressing)  4. Pt able to ascend/descend curb, independently, 1 handrail, with mod difficulty. (Not met, progressing)  5. Pt to demonstrate improved functional ability with FOTO limitation <=39% disability. (Not met, progressing)     Long Term Goals (12 Weeks):  1. Pt able to stand >=30 minutes with ADLs with min difficulty. (Not met, progressing)  2. Pt able to walk >=30 minutes with household chores with min difficulty. (Not met,  progressing)  3.  Pt able to transfer in/out of chairs 12x in 30 seconds as demonstrating improving functional strength. (Not met, progressing)  4. Pt able to ascend/descend 1 flight of stairs, independently, 1 handrail, with min difficulty. (Not met, progressing)  5. Pt to demonstrate improved functional ability with FOTO limitation <=30% disability. (Not met, progressing)    PLAN   Plan of care Certification: 12/15/2022 to 3/15/2023.    Improve B LE strength and balance        Sade Quigley

## 2023-02-01 ENCOUNTER — CLINICAL SUPPORT (OUTPATIENT)
Dept: REHABILITATION | Facility: OTHER | Age: 88
End: 2023-02-01
Payer: MEDICARE

## 2023-02-01 DIAGNOSIS — M25.60 DECREASED RANGE OF MOTION WITH DECREASED STRENGTH: ICD-10-CM

## 2023-02-01 DIAGNOSIS — M54.50 CHRONIC LEFT-SIDED LOW BACK PAIN WITHOUT SCIATICA: ICD-10-CM

## 2023-02-01 DIAGNOSIS — G89.29 CHRONIC LEFT-SIDED LOW BACK PAIN WITHOUT SCIATICA: ICD-10-CM

## 2023-02-01 DIAGNOSIS — Z74.09 IMPAIRED FUNCTIONAL MOBILITY, BALANCE, GAIT, AND ENDURANCE: Primary | ICD-10-CM

## 2023-02-01 DIAGNOSIS — R53.1 DECREASED RANGE OF MOTION WITH DECREASED STRENGTH: ICD-10-CM

## 2023-02-01 PROCEDURE — 97110 THERAPEUTIC EXERCISES: CPT | Mod: PN

## 2023-02-01 PROCEDURE — 97140 MANUAL THERAPY 1/> REGIONS: CPT | Mod: PN

## 2023-02-01 NOTE — PROGRESS NOTES
"[facility]   Physical Therapy Treatment Note      Name: Sonja Weeks  Clinic Number: [id number]     Therapy Diagnosis:        Encounter Diagnoses   Name Primary?    Impaired functional mobility, balance, gait, and endurance Yes    Decreased range of motion with decreased strength      Chronic left-sided low back pain without sciatica           Physician: Brennan Vazquez MD     Visit Date: 1/30/2023     Physician Orders: PT Eval and Treat   Medical Diagnosis from Referral: M15.9 (ICD-10-CM) - Primary osteoarthritis involving multiple joints   Evaluation Date: 12/15/2022  Authorization Period Expiration: 12/31/2022   Plan of Care Expiration: 3/15/2023  Progress Note Due: 1/15/2023  Visit # / Visits authorized: 7/ 20 (6 total)  FOTO: 1/3  PTA Visit #: 0/ 5     Precautions: Standard, cancer, and HTN, scoliosis, osteoporosis, R TKA, left knee arthroscopy, left reverse shoulder replacement      Time In: 11:45 am  Time Out: 12:15pm  Total Billable Time: 45 minutes     SUBJECTIVE   Pt reports: reports that her back is really bothering her today, more than usual. Patient reports she did not do any significant lifting or moving, just walking in the grocery store.      She was compliant with home exercise program.  Response to previous treatment: "reports she feels good"  Functional change: None today. Continued pain and difficulty with L low back and side, difficulty achieving upright posture     Pain: 7/10  Location: L side of mid-low back and hip      OBJECTIVE      See eval on 12/15/2022 for updated tests/measurements.     TREATMENT      Sonja received the bolded treatments listed below:       Patient received therapeutic exercises for 35 minutes for improved strength and AROM including:  LTR x 2x10 AROM  DKTC with PB x 2'  POSTERIOR PELVIC TILT 5 sec holds 1 x 10   TRANSVERSE ABDOMINUS marches 2 x 10   resisted LTR x 2' - manual perturbations with PT assist today  Bridges on ball " "2x10  SKTC x 10  Piriformis stretch (ezequiel and modified) 10 sec x 3   morgan cane flex, SA punches x 20 ea  Bridges + glute set x 2 min   SIDE LYING open books 2 x 10   Clamshells 2 x 10   Hook lying hip abduction isometrics with pilates ring, 5 sec holds 2 x 10   SAQ 2 x 10  Supine ball squeeze with POSTERIOR PELVIC TILT x 20  Seated lateral side bending, 5 sec holds 1 x 10   SLR with pilates ring press down 2x 10 ea     +seated (1/2 foam along spine)              Morgan ER 2 x 10 x GTB              Morgan Abd 2 x 10 x GTB              OH lift with red wt ball (fwd, diagonals) x 10 ea              Seated trunk rotations 2 x 5 ea x YTB              Seated rows x 2 x 10 x GTB     Patient received manual therapeutic technique for 15 minutes for improved soft tissue and joint mobility including:  Gentle SOFT TISSUE MOBILIZATION to left lateral hip musculature focusing on gluteus medius, piriformis, gluteus andrew, left lumbar erector spinae and quadratus lumborum      Patient received neuromuscular reeducation for 00 minutes for improved proprioception and balance including:  +Forward walking in \\ bars (without AD)  +Lateral walking in \\ bars (without AD)  +NBOS on foam 3 x 30"        Patient received therapeutic activities for 8 minutes for improved tolerance to functional activities including:  sit to stands (standard height chair + airex): 1x 10 AROM, 1 x 10 x pilates block between knees -->HHA for ascending, no HHA descending   Step ups on 4" step in \\ bars x 15 fwd, x 15 lat - limited fwd on L 2* c/o knee pain  Tandem walk in // bars 5 laps  Standing marching x20  Side stepping 5 laps           PATIENT EDUCATION AND HOME EXERCISES      Home Exercises Provided and Patient Education Provided      Education provided:   PT educated pt on importance of compliance with their HEP this visit.   1/17/23 - updated HEP, GTB given     Written Home Exercises Provided: Patient instructed to cont prior HEP. Exercises were reviewed " and Sonja was able to demonstrate them prior to the end of the session.  Sonja demonstrated fair  understanding of the education provided. See EMR under Patient Instructions for exercises provided during therapy sessions     ASSESSMENT   Patient reports increased pain today, therefore, performed gentle SOFT TISSUE MOBILIZATION and manual therapy techniques to left lumbar spine and hip region with high tissue reactivity decreasing to mod throughout session. Gentle range of motion and core stabilization exercises performed for improved stability and strength in neutral spine posture. Patient reports no pain throughout session. Patient performed POSTERIOR PELVIC TILT well with minimal cueing, performed prior to all exercises for anterior core control and activation. Increased side bending/rotation stretching today for decreased left lateral pain due to increased tightness.      We will continue to progress core strength and functional ability while monitoring her low back pain.   Consider referral to [facility] for chronic LBP if no longer progressing.     Sonja Is progressing well towards her goals.   Pt prognosis is Good.      Pt will continue to benefit from skilled outpatient physical therapy to address the deficits listed in the problem list box on initial evaluation, provide pt/family education and to maximize pt's level of independence in the home and community environment.      Pt's spiritual, cultural and educational needs considered and pt agreeable to plan of care and goals.     Anticipated barriers to physical therapy: None     Goals:   Short Term Goals (6 Weeks):  1. Pt able to stand >=30 minutes with ADLs with mod difficulty. (Not met, progressing)  2. Pt able to walk >=30 minutes with household chores with mod difficulty. (Not met, progressing)  3.  Pt able to transfer in/out of chairs 8x in 30 seconds as demonstrating improving functional strength. (Not met, progressing)  4. Pt able to ascend/descend  curb, independently, 1 handrail, with mod difficulty. (Not met, progressing)  5. Pt to demonstrate improved functional ability with FOTO limitation <=39% disability. (Not met, progressing)     Long Term Goals (12 Weeks):  1. Pt able to stand >=30 minutes with ADLs with min difficulty. (Not met, progressing)  2. Pt able to walk >=30 minutes with household chores with min difficulty. (Not met, progressing)  3.  Pt able to transfer in/out of chairs 12x in 30 seconds as demonstrating improving functional strength. (Not met, progressing)  4. Pt able to ascend/descend 1 flight of stairs, independently, 1 handrail, with min difficulty. (Not met, progressing)  5. Pt to demonstrate improved functional ability with FOTO limitation <=30% disability. (Not met, progressing)     PLAN   Plan of care Certification: 12/15/2022 to 3/15/2023.     Improve B LE strength and balance           Sade Quigley

## 2023-02-06 ENCOUNTER — CLINICAL SUPPORT (OUTPATIENT)
Dept: REHABILITATION | Facility: OTHER | Age: 88
End: 2023-02-06
Payer: MEDICARE

## 2023-02-06 DIAGNOSIS — M54.50 CHRONIC LEFT-SIDED LOW BACK PAIN WITHOUT SCIATICA: ICD-10-CM

## 2023-02-06 DIAGNOSIS — M25.60 DECREASED RANGE OF MOTION WITH DECREASED STRENGTH: ICD-10-CM

## 2023-02-06 DIAGNOSIS — G89.29 CHRONIC LEFT-SIDED LOW BACK PAIN WITHOUT SCIATICA: ICD-10-CM

## 2023-02-06 DIAGNOSIS — Z74.09 IMPAIRED FUNCTIONAL MOBILITY, BALANCE, GAIT, AND ENDURANCE: Primary | ICD-10-CM

## 2023-02-06 DIAGNOSIS — R53.1 DECREASED RANGE OF MOTION WITH DECREASED STRENGTH: ICD-10-CM

## 2023-02-06 PROCEDURE — 97140 MANUAL THERAPY 1/> REGIONS: CPT | Mod: PN

## 2023-02-06 PROCEDURE — 97110 THERAPEUTIC EXERCISES: CPT | Mod: PN

## 2023-02-06 NOTE — PROGRESS NOTES
"  [facility]   Physical Therapy Treatment Note      Name: Sonja Weeks  Clinic Number: [id number]     Therapy Diagnosis:        Encounter Diagnoses   Name Primary?    Impaired functional mobility, balance, gait, and endurance Yes    Decreased range of motion with decreased strength      Chronic left-sided low back pain without sciatica           Physician: Brennan Vazquez MD     Visit Date: 1/30/2023     Physician Orders: PT Eval and Treat   Medical Diagnosis from Referral: M15.9 (ICD-10-CM) - Primary osteoarthritis involving multiple joints   Evaluation Date: 12/15/2022  Authorization Period Expiration: 12/31/2022   Plan of Care Expiration: 3/15/2023  Progress Note Due: 1/15/2023  Visit # / Visits authorized: 7/ 20 (6 total)  FOTO: 1/3  PTA Visit #: 0/ 5     Precautions: Standard, cancer, and HTN, scoliosis, osteoporosis, R TKA, left knee arthroscopy, left reverse shoulder replacement      Time In: 11:45 am  Time Out: 12:15pm  Total Billable Time: 45 minutes     SUBJECTIVE   Pt reports: patient reports that she has had really bad lower left hip/back pain over the past couple of days. Patient reports it felt better after last treatment which lasted about a day or so.      She was compliant with home exercise program.  Response to previous treatment: "reports she feels good"  Functional change: None today. Continued pain and difficulty with L low back and side, difficulty achieving upright posture     Pain: 7/10  Location: L side of mid-low back and hip      OBJECTIVE      See eval on 12/15/2022 for updated tests/measurements.     TREATMENT      Sonja received the bolded treatments listed below:       Patient received therapeutic exercises for 30 minutes for improved strength and AROM including:  LTR x 2x10 AROM  DKTC with PB x 2'  POSTERIOR PELVIC TILT 5 sec holds 1 x 10   TRANSVERSE ABDOMINUS marches 2 x 10   resisted LTR x 2' - manual perturbations with PT assist today  Bridges " "on ball 2x10  SKTC x 10  Piriformis stretch (ezequiel and modified) 10 sec x 3   morgan cane flex, SA punches x 20 ea  Bridges + glute set x 2 min   SIDE LYING open books 2 x 10   Clamshells 2 x 15  Reverse Clamshells, towel roll between knees 2 x 15   Hook lying hip abduction isometrics with pilates ring, 5 sec holds 2 x 10   SAQ 2 x 10  Supine ball squeeze with POSTERIOR PELVIC TILT x 20  Seated lateral side bending, 5 sec holds 1 x 10   SLR with pilates ring press down 2x 10 ea     standing              Morgan ER 2 x 10 x GTB              Morgan Abd 2 x 10 x GTB              OH lift with red wt ball (fwd, diagonals) x 10 ea              Seated trunk rotations 2 x 5 ea x YTB              Seated rows x 2 x 10 x GTB    Seated D2 flexion RTB 2 x 10      Patient received manual therapeutic technique for 15 minutes for improved soft tissue and joint mobility including:  Gentle SOFT TISSUE MOBILIZATION to left lateral hip musculature focusing on gluteus medius, piriformis, gluteus andrew, left lumbar erector spinae and quadratus lumborum      Patient received neuromuscular reeducation for 00 minutes for improved proprioception and balance including:  +Forward walking in \\ bars (without AD)  +Lateral walking in \\ bars (without AD)  +NBOS on foam 3 x 30"        Patient received therapeutic activities for 00 minutes for improved tolerance to functional activities including:  sit to stands (standard height chair + airex): 1x 10 AROM, 1 x 10 x pilates block between knees -->HHA for ascending, no HHA descending   Step ups on 4" step in \\ bars x 15 fwd, x 15 lat - limited fwd on L 2* c/o knee pain  Tandem walk in // bars 5 laps  Standing marching x20  Side stepping 5 laps           PATIENT EDUCATION AND HOME EXERCISES      Home Exercises Provided and Patient Education Provided      Education provided:   PT educated pt on importance of compliance with their HEP this visit.   1/17/23 - updated HEP, GTB given     Written Home Exercises " "Provided: Patient instructed to cont prior HEP. Exercises were reviewed and Sonja was able to demonstrate them prior to the end of the session.  Sonja demonstrated fair  understanding of the education provided. See EMR under Patient Instructions for exercises provided during therapy sessions     ASSESSMENT   Continued performance of gentle SOFT TISSUE MOBILIZATION and manual therapy techniques to left lateral gluteal and lumbar spine for trigger point release and decreased pain. Patient reports high tissue reactivity with progression to mod tissue reactivity. Patient has low tissue reactivity to right gluteal (glut med) musculature and significant decreased muscle tone/tension along right LOWER EXTREMITY. Performed hip strength and range of motion for improved motor control and awareness of pelvis, ACTIVE ASSISTED RANGE OF MOTION for side lying hip abduction  LEFT LOWER EXTREMITY (able to perform ACTIVE RANGE OF MOTION on RIGHT LOWER EXTREMITY). Progressed to standing postural stabilization and strengthening exercises today with no increase in symptoms, patient reports feeling "Good".          We will continue to progress core strength and functional ability while monitoring her low back pain.   Consider referral to [facility] for chronic LBP if no longer progressing.     Sonja Is progressing well towards her goals.   Pt prognosis is Good.      Pt will continue to benefit from skilled outpatient physical therapy to address the deficits listed in the problem list box on initial evaluation, provide pt/family education and to maximize pt's level of independence in the home and community environment.      Pt's spiritual, cultural and educational needs considered and pt agreeable to plan of care and goals.     Anticipated barriers to physical therapy: None     Goals:   Short Term Goals (6 Weeks):  1. Pt able to stand >=30 minutes with ADLs with mod difficulty. (Not met, progressing)  2. Pt able to walk >=30 minutes with " household chores with mod difficulty. (Not met, progressing)  3.  Pt able to transfer in/out of chairs 8x in 30 seconds as demonstrating improving functional strength. (Not met, progressing)  4. Pt able to ascend/descend curb, independently, 1 handrail, with mod difficulty. (Not met, progressing)  5. Pt to demonstrate improved functional ability with FOTO limitation <=39% disability. (Not met, progressing)     Long Term Goals (12 Weeks):  1. Pt able to stand >=30 minutes with ADLs with min difficulty. (Not met, progressing)  2. Pt able to walk >=30 minutes with household chores with min difficulty. (Not met, progressing)  3.  Pt able to transfer in/out of chairs 12x in 30 seconds as demonstrating improving functional strength. (Not met, progressing)  4. Pt able to ascend/descend 1 flight of stairs, independently, 1 handrail, with min difficulty. (Not met, progressing)  5. Pt to demonstrate improved functional ability with FOTO limitation <=30% disability. (Not met, progressing)     PLAN   Plan of care Certification: 12/15/2022 to 3/15/2023.     Improve B LE strength and balance           Sade Quigley

## 2023-02-08 ENCOUNTER — TELEPHONE (OUTPATIENT)
Dept: INFUSION THERAPY | Facility: OTHER | Age: 88
End: 2023-02-08
Payer: MEDICARE

## 2023-02-08 ENCOUNTER — CLINICAL SUPPORT (OUTPATIENT)
Dept: REHABILITATION | Facility: OTHER | Age: 88
End: 2023-02-08
Payer: MEDICARE

## 2023-02-08 DIAGNOSIS — G89.29 CHRONIC LEFT-SIDED LOW BACK PAIN WITHOUT SCIATICA: ICD-10-CM

## 2023-02-08 DIAGNOSIS — M25.60 DECREASED RANGE OF MOTION WITH DECREASED STRENGTH: ICD-10-CM

## 2023-02-08 DIAGNOSIS — Z74.09 IMPAIRED FUNCTIONAL MOBILITY, BALANCE, GAIT, AND ENDURANCE: Primary | ICD-10-CM

## 2023-02-08 DIAGNOSIS — R53.1 DECREASED RANGE OF MOTION WITH DECREASED STRENGTH: ICD-10-CM

## 2023-02-08 DIAGNOSIS — M54.50 CHRONIC LEFT-SIDED LOW BACK PAIN WITHOUT SCIATICA: ICD-10-CM

## 2023-02-08 PROCEDURE — 97110 THERAPEUTIC EXERCISES: CPT | Mod: PN

## 2023-02-08 PROCEDURE — 97140 MANUAL THERAPY 1/> REGIONS: CPT | Mod: PN

## 2023-02-08 NOTE — PROGRESS NOTES
"  [facility]   Physical Therapy Treatment Note      Name: Sonja Weeks  Clinic Number: [id number]     Therapy Diagnosis:        Encounter Diagnoses   Name Primary?    Impaired functional mobility, balance, gait, and endurance Yes    Decreased range of motion with decreased strength      Chronic left-sided low back pain without sciatica           Physician: Brennan Vazquez MD     Visit Date: 1/30/2023     Physician Orders: PT Eval and Treat   Medical Diagnosis from Referral: M15.9 (ICD-10-CM) - Primary osteoarthritis involving multiple joints   Evaluation Date: 12/15/2022  Authorization Period Expiration: 12/31/2022   Plan of Care Expiration: 3/15/2023  Progress Note Due: 1/15/2023  Visit # / Visits authorized: 8/ 20 (6 total)  FOTO: 1/3  PTA Visit #: 0/ 5     Precautions: Standard, cancer, and HTN, scoliosis, osteoporosis, R TKA, left knee arthroscopy, left reverse shoulder replacement      Time In: 11:45 am  Time Out: 12:15pm  Total Billable Time: 45 minutes     SUBJECTIVE   Pt reports: Patient reports feeling better after last session, lasted about a day or so. Patient reports woke up in a lot of pain but then put some biofreeze and it felt a little better.      She was compliant with home exercise program.  Response to previous treatment: "reports she feels good"  Functional change: None today. Continued pain and difficulty with L low back and side, difficulty achieving upright posture     Pain: 7/10  Location: L side of mid-low back and hip      OBJECTIVE      See eval on 12/15/2022 for updated tests/measurements.     TREATMENT      Sonja received the bolded treatments listed below:       Patient received therapeutic exercises for 30 minutes for improved strength and AROM including:  LTR x 2x10 AROM  DKTC with PB x 2'  POSTERIOR PELVIC TILT 5 sec holds 1 x 10   TRANSVERSE ABDOMINUS marches 2 x 10   FB with STABILITY BALL 2 x 10   FB with STABILITY BALL to bending 2 x 10 " "  resisted LTR x 2' - manual perturbations with PT assist today  Bridges on ball 2x10  SKTC x 10  Piriformis stretch (ezequiel and modified) 10 sec x 3   morgan cane flex, SA punches x 20 ea  Bridges + glute set GTB x 2 min   SIDE LYING open books 2 x 10   Clamshells GTB 2 x 15  Reverse Clamshells, towel roll between knees 2 x 15   Hook lying hip abduction isometrics with pilates ring, 5 sec holds 2 x 10   SAQ 2 x 10  Supine ball squeeze with POSTERIOR PELVIC TILT x 20  Seated lateral side bending, 5 sec holds 1 x 10   SLR with pilates ring press down 2x 10 ea     standing              Morgan ER 2 x 10 x GTB              Morgan Abd 2 x 10 x GTB              OH lift with red wt ball (fwd, diagonals) x 10 ea              Seated trunk rotations 2 x 5 ea x YTB              Seated rows x 2 x 10 x GTB    Standing D2 flexion RTB 2 x 10      Patient received manual therapeutic technique for 15 minutes for improved soft tissue and joint mobility including:  Gentle SOFT TISSUE MOBILIZATION to left lateral hip musculature focusing on gluteus medius, piriformis, gluteus andrew, left lumbar erector spinae and quadratus lumborum      Patient received neuromuscular reeducation for 00 minutes for improved proprioception and balance including:  +Forward walking in \\ bars (without AD)  +Lateral walking in \\ bars (without AD)  +NBOS on foam 3 x 30"        Patient received therapeutic activities for 00 minutes for improved tolerance to functional activities including:  sit to stands (standard height chair + airex): 1x 10 AROM, 1 x 10 x pilates block between knees -->HHA for ascending, no HHA descending   Step ups on 4" step in \\ bars x 15 fwd, x 15 lat - limited fwd on L 2* c/o knee pain  Tandem walk in // bars 5 laps  Standing marching x20  Side stepping 5 laps           PATIENT EDUCATION AND HOME EXERCISES      Home Exercises Provided and Patient Education Provided      Education provided:   PT educated pt on importance of compliance with " their HEP this visit.   1/17/23 - updated HEP, GTB given     Written Home Exercises Provided: Patient instructed to cont prior HEP. Exercises were reviewed and Sonja was able to demonstrate them prior to the end of the session.  Sonja demonstrated fair  understanding of the education provided. See EMR under Patient Instructions for exercises provided during therapy sessions     ASSESSMENT   Continued performance of gentle SOFT TISSUE MOBILIZATION and manual therapy techniques to left lateral gluteal and lumbar spine for trigger point release and decreased pain. Patient reports high tissue reactivity with progression to mod tissue reactivity. Patient has low tissue reactivity to right gluteal (glut med) musculature and significant decreased muscle tone/tension along right LOWER EXTREMITY. Increased hip strengthening exercises to include resistance band (GTB) for clamshells, bridges, supine clamshells, and standing hip abduction exercise. PATIENT reports no increase in pain throughout session with resistance exercises.         We will continue to progress core strength and functional ability while monitoring her low back pain.   Consider referral to [facility] for chronic LBP if no longer progressing.     Sonja Is progressing well towards her goals.   Pt prognosis is Good.      Pt will continue to benefit from skilled outpatient physical therapy to address the deficits listed in the problem list box on initial evaluation, provide pt/family education and to maximize pt's level of independence in the home and community environment.      Pt's spiritual, cultural and educational needs considered and pt agreeable to plan of care and goals.     Anticipated barriers to physical therapy: None     Goals:   Short Term Goals (6 Weeks):  1. Pt able to stand >=30 minutes with ADLs with mod difficulty. (Not met, progressing)  2. Pt able to walk >=30 minutes with household chores with mod difficulty. (Not met, progressing)  3.  Pt  able to transfer in/out of chairs 8x in 30 seconds as demonstrating improving functional strength. (Not met, progressing)  4. Pt able to ascend/descend curb, independently, 1 handrail, with mod difficulty. (Not met, progressing)  5. Pt to demonstrate improved functional ability with FOTO limitation <=39% disability. (Not met, progressing)     Long Term Goals (12 Weeks):  1. Pt able to stand >=30 minutes with ADLs with min difficulty. (Not met, progressing)  2. Pt able to walk >=30 minutes with household chores with min difficulty. (Not met, progressing)  3.  Pt able to transfer in/out of chairs 12x in 30 seconds as demonstrating improving functional strength. (Not met, progressing)  4. Pt able to ascend/descend 1 flight of stairs, independently, 1 handrail, with min difficulty. (Not met, progressing)  5. Pt to demonstrate improved functional ability with FOTO limitation <=30% disability. (Not met, progressing)     PLAN   Plan of care Certification: 12/15/2022 to 3/15/2023.     Improve B LE strength and balance           Sade Quigley

## 2023-02-10 NOTE — PROGRESS NOTES
Subjective:      Patient ID: Sonja Weeks is a 89 y.o. female.    Chief Complaint: No chief complaint on file.    Ms Weeks is a 89 yo female sent in consultation by Dr. Vazquez  for evaluation for the healthy back {lumbar,cervical} program.  {he, she} has had {low back, neck} pain for {days,weeks,months, years}.  The pain is {back, leg neck, arm} dominant.  The pain is {constant,intermittant}.  It is worse with {looking down,bending}.           ROS      Objective:        General: Sonja is well-developed, well-nourished, appears stated age, in no acute distress, alert and oriented to time, place and person.     Ortho/SPM Exam            Assessment:       No diagnosis found.       Plan:                Follow-up: No follow-ups on file. If there are any questions prior to this, the patient was instructed to contact the office.

## 2023-02-13 ENCOUNTER — CLINICAL SUPPORT (OUTPATIENT)
Dept: REHABILITATION | Facility: OTHER | Age: 88
End: 2023-02-13
Attending: INTERNAL MEDICINE
Payer: MEDICARE

## 2023-02-13 ENCOUNTER — CLINICAL SUPPORT (OUTPATIENT)
Dept: REHABILITATION | Facility: OTHER | Age: 88
End: 2023-02-13
Attending: PHYSICAL MEDICINE & REHABILITATION
Payer: MEDICARE

## 2023-02-13 VITALS
DIASTOLIC BLOOD PRESSURE: 68 MMHG | BODY MASS INDEX: 27.22 KG/M2 | SYSTOLIC BLOOD PRESSURE: 118 MMHG | HEIGHT: 62 IN | WEIGHT: 147.94 LBS | HEART RATE: 60 BPM

## 2023-02-13 DIAGNOSIS — M47.816 SPONDYLOSIS OF LUMBAR REGION WITHOUT MYELOPATHY OR RADICULOPATHY: Primary | ICD-10-CM

## 2023-02-13 DIAGNOSIS — M41.9 SCOLIOSIS, UNSPECIFIED SCOLIOSIS TYPE, UNSPECIFIED SPINAL REGION: ICD-10-CM

## 2023-02-13 DIAGNOSIS — M54.9 BACK PAIN, UNSPECIFIED BACK LOCATION, UNSPECIFIED BACK PAIN LATERALITY, UNSPECIFIED CHRONICITY: ICD-10-CM

## 2023-02-13 PROCEDURE — 99204 PR OFFICE/OUTPT VISIT, NEW, LEVL IV, 45-59 MIN: ICD-10-PCS | Mod: ,,, | Performed by: PHYSICAL MEDICINE & REHABILITATION

## 2023-02-13 PROCEDURE — 99204 OFFICE O/P NEW MOD 45 MIN: CPT | Mod: ,,, | Performed by: PHYSICAL MEDICINE & REHABILITATION

## 2023-02-13 NOTE — PROGRESS NOTES
Subjective:      Patient ID: Sonja Weeks is a 89 y.o. female.    Chief Complaint: No chief complaint on file.    Referred by: Brennan Vazquez MD     Ms Weeks is a 89 yo female with scoliosis and osteoporosis sent in consultation by Dr. Vazquez  for evaluation for the healthy back lumbar program.  she has had low back pain for 20 years with gradual onset.  She has a history of falls. She was having spasms 5 months ago but they have gotten better.  The pain is left low back dominant, and goes to the middle of the back.  The pain is a tolerable soreness.  There is no tingling, numbness burning or weakness.  The pain is intermittent 0-5/10.  It is worse with standing, bending, lying on stomach, sit to stand, morning and afternoon.  It is better with sitting, walking, lying on her back, stairs, evening and nighttime.  She is going to PT at [facility] and that has been helping.  She did PT in the past.  She has not had surgery or seen chiropractor. She had an injection 2 years ago. Her goals are standing, bending, and sit to stand.    MRi lumbar and thoracic disc reviewed.     Past Medical History:  No date: Hypertension  No date: Osteoporosis  No date: Scoliosis  No date: Thyroid disease    Past Surgical History:  No date: APPENDECTOMY  No date: cataract; Bilateral  No date: KNEE ARTHROSCOPY  No date: SHOULDER SURGERY  No date: TOTAL KNEE ARTHROPLASTY    No family history on file.      Social History    Socioeconomic History      Marital status:     Tobacco Use      Smoking status: Former      Smokeless tobacco: Never    Substance and Sexual Activity      Alcohol use: Yes      Drug use: Never      Current Outpatient Medications:  amLODIPine (NORVASC) 2.5 MG tablet, Take 2.5 mg by mouth once daily., Disp: , Rfl:   hydroCHLOROthiazide (HYDRODIURIL) 25 MG tablet, Take 1 tablet (25 mg total) by mouth once daily., Disp: 90 tablet, Rfl: 3  levothyroxine (SYNTHROID) 112 MCG tablet, Take 1 tablet (112 mcg total) by  mouth before breakfast., Disp: 90 tablet, Rfl: 3  rosuvastatin (CRESTOR) 5 MG tablet, Take 5 mg by mouth once daily., Disp: , Rfl:   aspirin (ECOTRIN) 81 MG EC tablet, Take 81 mg by mouth every other day., Disp: , Rfl:   cholecalciferol, vitamin D3, (VITAMIN D3) 25 mcg (1,000 unit) capsule, Take 1,000 Units by mouth once daily., Disp: , Rfl:   diazePAM (VALIUM) 2 MG tablet, Take 2 mg by mouth 2 (two) times daily as needed., Disp: , Rfl:     No current facility-administered medications for this visit.      Review of patient's allergies indicates:   -- Codeine    -- Pcn [penicillins]           Review of Systems   Constitutional: Negative for weight gain and weight loss.   Cardiovascular:  Positive for chest pain.   Respiratory:  Negative for shortness of breath.    Musculoskeletal:  Positive for back pain. Negative for joint pain and joint swelling.   Gastrointestinal:  Negative for abdominal pain, bowel incontinence, nausea and vomiting.   Genitourinary:  Negative for bladder incontinence.   Neurological:  Negative for numbness and paresthesias.         Objective:        General    Vitals reviewed.  Constitutional: She is oriented to person, place, and time. She appears well-developed and well-nourished.   HENT:   Head: Normocephalic and atraumatic.   Pulmonary/Chest: Effort normal.   Neurological: She is alert and oriented to person, place, and time.   Psychiatric: She has a normal mood and affect. Her behavior is normal. Judgment and thought content normal.     General Musculoskeletal Exam   Gait: abnormal     Right Ankle/Foot Exam     Tests   Tiptoe Walk: able to perform    Left Ankle/Foot Exam     Tests   Tiptoe Walk: able to perform  Left Hip Exam     Tenderness  Also left side trochanteric tenderness.      Back (L-Spine & T-Spine) / Neck (C-Spine) Exam     Tenderness   The patient is tender to palpation of the left side trochanteric.     Back (L-Spine & T-Spine) Range of Motion   Extension:  0   Flexion:  70    Lateral bend right:  10   Lateral bend left:  10     Spinal Sensation   Right Side Sensation  C-Spine Level: normal   L-Spine Level: normal  S-Spine Level: normal  Left Side Sensation  C-Spine Level: normal  L-Spine Level: normal  S-Spine Level: normal    Back (L-Spine & T-Spine) Tests   Right Side Tests  Straight leg raise:        Sitting SLR: > 70 degrees    Left Side Tests  Straight leg raise:       Sitting SLR: > 70 degrees      Other   She has scoliosis .  Spinal Kyphosis:  Absent      Muscle Strength   Right Upper Extremity   Biceps: 5/5   Deltoid:  5/5  Triceps:  5/5  Wrist extension: 5/5   Finger Flexors:  5/5  Left Upper Extremity  Biceps: 5/5   Deltoid:  5/5  Triceps:  5/5  Wrist extension: 5/5   Finger Flexors:  5/5  Right Lower Extremity   Hip Flexion: 5/5   Quadriceps:  5/5   Anterior tibial:  5/5   EHL:  5/5  Left Lower Extremity   Hip Flexion: 5/5   Quadriceps:  5/5   Anterior tibial:  5/5   EHL:  5/5    Reflexes     Left Side  Biceps:  2+  Triceps:  2+  Brachioradialis:  2+  Achilles:  2+  Left Munoz's Sign:  Absent  Babinski Sign:  absent  Quadriceps:  2+    Right Side   Biceps:  2+  Triceps:  2+  Brachioradialis:  2+  Achilles:  2+  Right Munoz's Sign:  absent  Babinski Sign:  absent  Quadriceps:  2+    Vascular Exam     Right Pulses        Carotid:                  2+    Left Pulses        Carotid:                  2+      Assessment:       Encounter Diagnoses   Name Primary?    Spondylosis of lumbar region without myelopathy or radiculopathy Yes    Scoliosis, unspecified scoliosis type, unspecified spinal region     Back pain, unspecified back location, unspecified back pain laterality, unspecified chronicity          Plan:       Diagnoses and all orders for this visit:    Spondylosis of lumbar region without myelopathy or radiculopathy    Scoliosis, unspecified scoliosis type, unspecified spinal region    Back pain, unspecified back location, unspecified back pain laterality, unspecified  chronicity  -     Ambulatory referral/consult to [facility]         We discussed the healthy back program and the pros and cons.  She has made progress with PT and back at the her baseline.  She feels like the she is able to function at her current level of back pain.  She is more mobile than she was 5 months ago and no longer using a walker.  She has been doing PT at [facility] and enjoying it.  We discussed finishing her current PT and then doing her exercises on her own.  We discussed that if she needs healthy back at another time it could be an option.  We also discussed the importance of small walks throughout the day.  We discussed working with the  and gym at [facility].  We discussed do not want to cause more pain.      Currently we will wait on healthy back

## 2023-02-13 NOTE — PROGRESS NOTES
Health  met with patient to complete initial outcomes for the Healthy Back lumbar program.  Questions were reviewed with patient and discussed with Dr. Wills. The patient will meet with Dr. Wills to determine program enrollment.     HealthyBack Questionnaire  2/13/2023   Please select the location of your worst pain:  Lower Back   Please select the location of any additional pain: (hold down the control key, and click to select multiple responses) None of the above    Did the pain begin after an event, injury, or accident? No   How long has this pain been going on?  20 yrs   Please indicate how the pain is changing.  Improving   What is the WORST level of pain that you have experienced in the last week?  5   What is the LEAST level of pain that you have experienced in the past week?  0   What can you NOT do not that you used to be able to do?  n/a   Are your limitations mainly due to your pain?  Yes   What are your additional complaints, if any? None   Is there ever a time during the day when your pain stops, even for a brief moment, before returning? Yes   If yes, when your pain stops, does it disappear completely? Is it totally gone? No   Does bending forward make your typical pain worse? Yes   Morning: Worse during   Afternoon: Worse during   Evening:  Better during   Nighttime: Better during   Standing:  Worse   Lying on stomach: Worse   Lying on back: Better   Sitting:  Better   Walking: Better   Climbing stairs: Better   Emergency department  No   Health care providers (hold down the control key, and click to select multiple responses) Family doctor, Physical therapist   Investigations done (hold down the control key, and click to select multiple responses) X-ray, MRI   Procedures (hold down the control key, and click to select multiple responses) Injections   Have you had any surgery on your back?  No   Please klaus what you are experiencing. (hold down the control key, and click to select multiple  responses) None   First activity you would like to perform better:  standing   Second activity you would like to perform better: bending   Third activity you would like to perform better: sit to stand   What is your occupation?  retired   What is your highest level of education? Prefer not to answer   What is your work status? Retired   How did you hear about the Healthyback program?  Physician

## 2023-02-15 ENCOUNTER — CLINICAL SUPPORT (OUTPATIENT)
Dept: REHABILITATION | Facility: OTHER | Age: 88
End: 2023-02-15
Payer: MEDICARE

## 2023-02-15 DIAGNOSIS — G89.29 CHRONIC LEFT-SIDED LOW BACK PAIN WITHOUT SCIATICA: ICD-10-CM

## 2023-02-15 DIAGNOSIS — M25.60 DECREASED RANGE OF MOTION WITH DECREASED STRENGTH: ICD-10-CM

## 2023-02-15 DIAGNOSIS — Z74.09 IMPAIRED FUNCTIONAL MOBILITY, BALANCE, GAIT, AND ENDURANCE: Primary | ICD-10-CM

## 2023-02-15 DIAGNOSIS — M54.50 CHRONIC LEFT-SIDED LOW BACK PAIN WITHOUT SCIATICA: ICD-10-CM

## 2023-02-15 DIAGNOSIS — R53.1 DECREASED RANGE OF MOTION WITH DECREASED STRENGTH: ICD-10-CM

## 2023-02-15 PROCEDURE — 97140 MANUAL THERAPY 1/> REGIONS: CPT | Mod: PN

## 2023-02-15 PROCEDURE — 97110 THERAPEUTIC EXERCISES: CPT | Mod: PN

## 2023-02-15 NOTE — PROGRESS NOTES
"  [facility]   Physical Therapy Treatment Note      Name: Sonja Weeks  Clinic Number: [id number]     Therapy Diagnosis:        Encounter Diagnoses   Name Primary?    Impaired functional mobility, balance, gait, and endurance Yes    Decreased range of motion with decreased strength      Chronic left-sided low back pain without sciatica           Physician: Brennan Vazquez MD     Visit Date: 1/30/2023     Physician Orders: PT Eval and Treat   Medical Diagnosis from Referral: M15.9 (ICD-10-CM) - Primary osteoarthritis involving multiple joints   Evaluation Date: 12/15/2022  Authorization Period Expiration: 12/31/2022   Plan of Care Expiration: 3/15/2023  Progress Note Due: 1/15/2023  Visit # / Visits authorized: 9/ 20 (6 total)  FOTO: 1/3  PTA Visit #: 0/ 5     Precautions: Standard, cancer, and HTN, scoliosis, osteoporosis, R TKA, left knee arthroscopy, left reverse shoulder replacement      Time In: 11:30 am  Time Out: 12:15pm  Total Billable Time: 45 minutes     SUBJECTIVE   Pt reports: Patient reports feeling better after last session, lasted about a day or so. Patient reports woke up in a lot of pain but then put some biofreeze and it felt a little better. Patient reports that she had a bad day yesterday, but today she is feeling a little bit better. Patient reports she saw a doctor with healthy back and they both decided that it was not a good fit for her.      She was compliant with home exercise program.  Response to previous treatment: "reports she feels good"  Functional change: None today. Continued pain and difficulty with L low back and side, difficulty achieving upright posture     Pain: 6/10  Location: L side of mid-low back and hip      OBJECTIVE      See eval on 12/15/2022 for updated tests/measurements.     TREATMENT      Sonja received the bolded treatments listed below:       Patient received therapeutic exercises for 30 minutes for improved strength and AROM " "including:  LTR x 2x10 AROM  DKTC with PB x 2'  POSTERIOR PELVIC TILT 5 sec holds 1 x 10   TRANSVERSE ABDOMINUS marches 2 x 10   FB with STABILITY BALL 2 x 10   FB with STABILITY BALL to bending 2 x 10   resisted LTR x 2' - manual perturbations with PT assist today  Bridges on ball 2x10  SKTC x 10  Piriformis stretch (ezequiel and modified) 10 sec x 3   morgan cane flex, SA punches x 20 ea  Bridges + glute set GTB x 2 min   SIDE LYING open books 2 x 10   Clamshells GTB 2 x 15  Reverse Clamshells, towel roll between knees 2 x 15   SIDE LYING hip abduction 2 x 10 ACTIVE ASSISTED RANGE OF MOTION RIGHT LOWER EXTREMITY   Hook lying hip abduction isometrics with pilates ring, 5 sec holds 2 x 10   Reverse clamshells 2 x 10   SAQ 2 x 10  Supine ball squeeze with POSTERIOR PELVIC TILT x 20  Seated lateral side bending, 5 sec holds 1 x 10   SLR with pilates ring press down 2x 10 ea     standing              Morgan ER 2 x 10 x GTB              Morgan Abd 2 x 10 x GTB              OH lift with red wt ball (fwd, diagonals) x 10 ea              Seated trunk rotations 2 x 5 ea x YTB              Seated rows x 2 x 10 x GTB    Standing D2 flexion RTB 2 x 10      Patient received manual therapeutic technique for 15 minutes for improved soft tissue and joint mobility including:  Gentle SOFT TISSUE MOBILIZATION to left lateral hip musculature focusing on gluteus medius, piriformis, gluteus andrew, left lumbar erector spinae and quadratus lumborum      Patient received neuromuscular reeducation for 00 minutes for improved proprioception and balance including:  +Forward walking in \\ bars (without AD)  +Lateral walking in \\ bars (without AD)  +NBOS on foam 3 x 30"        Patient received therapeutic activities for 00 minutes for improved tolerance to functional activities including:  sit to stands (standard height chair + airex): 1x 10 AROM, 1 x 10 x pilates block between knees -->HHA for ascending, no HHA descending   Step ups on 4" step in \\ " bars x 15 fwd, x 15 lat - limited fwd on L 2* c/o knee pain  Tandem walk in // bars 5 laps  Standing marching x20  Side stepping 5 laps           PATIENT EDUCATION AND HOME EXERCISES      Home Exercises Provided and Patient Education Provided      Education provided:   PT educated pt on importance of compliance with their HEP this visit.   1/17/23 - updated HEP, GTB given     Written Home Exercises Provided: Patient instructed to cont prior HEP. Exercises were reviewed and Sonja was able to demonstrate them prior to the end of the session.  Sonja demonstrated fair  understanding of the education provided. See EMR under Patient Instructions for exercises provided during therapy sessions     ASSESSMENT   No significant changes with treatment at this time as patient continues to demonstrate difficulty with current exercises due to decreased strength and limitations with bilateral hip and posterior chain strength. Patient required minmal assistance from therapist for performing side lying hip abduction with LEFT LOWER EXTREMITY due to increased pain and significant strength deficits, decreased pain and not difficulty with RIGHT LOWER EXTREMITY. Upright posture and posterior chain strengthening for decreased abnormal posture, improved positional awareness, and stabilization/endurance of postural musculature. Patient required verbal cues for all exercises for proper performance and activation of specific musculature.      We will continue to progress core strength and functional ability while monitoring her low back pain.   Consider referral to [facility] for chronic LBP if no longer progressing.     Sonja Is progressing well towards her goals.   Pt prognosis is Good.      Pt will continue to benefit from skilled outpatient physical therapy to address the deficits listed in the problem list box on initial evaluation, provide pt/family education and to maximize pt's level of independence in the home and community  environment.      Pt's spiritual, cultural and educational needs considered and pt agreeable to plan of care and goals.     Anticipated barriers to physical therapy: None     Goals:   Short Term Goals (6 Weeks):  1. Pt able to stand >=30 minutes with ADLs with mod difficulty. (Not met, progressing)  2. Pt able to walk >=30 minutes with household chores with mod difficulty. (Not met, progressing)  3.  Pt able to transfer in/out of chairs 8x in 30 seconds as demonstrating improving functional strength. (Not met, progressing)  4. Pt able to ascend/descend curb, independently, 1 handrail, with mod difficulty. (Not met, progressing)  5. Pt to demonstrate improved functional ability with FOTO limitation <=39% disability. (Not met, progressing)     Long Term Goals (12 Weeks):  1. Pt able to stand >=30 minutes with ADLs with min difficulty. (Not met, progressing)  2. Pt able to walk >=30 minutes with household chores with min difficulty. (Not met, progressing)  3.  Pt able to transfer in/out of chairs 12x in 30 seconds as demonstrating improving functional strength. (Not met, progressing)  4. Pt able to ascend/descend 1 flight of stairs, independently, 1 handrail, with min difficulty. (Not met, progressing)  5. Pt to demonstrate improved functional ability with FOTO limitation <=30% disability. (Not met, progressing)     PLAN   Plan of care Certification: 12/15/2022 to 3/15/2023.     Improve B LE strength and balance           Sade Quigley

## 2023-02-20 ENCOUNTER — CLINICAL SUPPORT (OUTPATIENT)
Dept: REHABILITATION | Facility: OTHER | Age: 88
End: 2023-02-20
Payer: MEDICARE

## 2023-02-20 DIAGNOSIS — R53.1 DECREASED RANGE OF MOTION WITH DECREASED STRENGTH: ICD-10-CM

## 2023-02-20 DIAGNOSIS — Z74.09 IMPAIRED FUNCTIONAL MOBILITY, BALANCE, GAIT, AND ENDURANCE: Primary | ICD-10-CM

## 2023-02-20 DIAGNOSIS — M54.50 CHRONIC LEFT-SIDED LOW BACK PAIN WITHOUT SCIATICA: ICD-10-CM

## 2023-02-20 DIAGNOSIS — M25.60 DECREASED RANGE OF MOTION WITH DECREASED STRENGTH: ICD-10-CM

## 2023-02-20 DIAGNOSIS — G89.29 CHRONIC LEFT-SIDED LOW BACK PAIN WITHOUT SCIATICA: ICD-10-CM

## 2023-02-20 PROCEDURE — 97140 MANUAL THERAPY 1/> REGIONS: CPT | Mod: PN

## 2023-02-20 PROCEDURE — 97110 THERAPEUTIC EXERCISES: CPT | Mod: PN

## 2023-02-20 NOTE — PROGRESS NOTES
"    [facility]   Physical Therapy Treatment Note      Name: Sonja Weeks  Clinic Number: [id number]     Therapy Diagnosis:        Encounter Diagnoses   Name Primary?    Impaired functional mobility, balance, gait, and endurance Yes    Decreased range of motion with decreased strength      Chronic left-sided low back pain without sciatica           Physician: Brennan Vazquez MD     Visit Date: 1/30/2023     Physician Orders: PT Eval and Treat   Medical Diagnosis from Referral: M15.9 (ICD-10-CM) - Primary osteoarthritis involving multiple joints   Evaluation Date: 12/15/2022  Authorization Period Expiration: 12/31/2022   Plan of Care Expiration: 3/15/2023  Progress Note Due: 1/15/2023  Visit # / Visits authorized: 14/ 20   FOTO: 1/3  PTA Visit #: 0/ 5     Precautions: Standard, cancer, and HTN, scoliosis, osteoporosis, R TKA, left knee arthroscopy, left reverse shoulder replacement      Time In: 1:45 pm   Time Out: 2:30 pm  Total Billable Time: 45 minutes     SUBJECTIVE   Pt reports: Patient reports that she has been managing her back well overall      She was compliant with home exercise program.  Response to previous treatment: "reports she feels good"  Functional change: None today. Continued pain and difficulty with L low back and side, difficulty achieving upright posture     Pain: 6/10  Location: L side of mid-low back and hip      OBJECTIVE      See eval on 12/15/2022 for updated tests/measurements.     TREATMENT      Sonja received the bolded treatments listed below:       Patient received therapeutic exercises for 30 minutes for improved strength and AROM including:  LTR x 2x10 AROM  DKTC with PB x 2'  POSTERIOR PELVIC TILT 5 sec holds 1 x 10   TRANSVERSE ABDOMINUS marches 2 x 10   FB with STABILITY BALL 2 x 10   FB with STABILITY BALL to bending 2 x 10   resisted LTR x 2' - manual perturbations with PT assist today  Bridges on ball 2x10  SKTC x 10  Piriformis stretch " "(ezequiel and modified) 10 sec x 3   morgan cane flex, SA punches x 20 ea  Bridges + glute set GTB x 2 min   STRAIGHT LEG RAISE with POSTERIOR PELVIC TILT 2 x 10   SIDE LYING open books 2 x 10   Clamshells GTB 2 x 15  Reverse Clamshells, towel roll between knees 2 x 15   SIDE LYING hip abduction 2 x 10 ACTIVE ASSISTED RANGE OF MOTION RIGHT LOWER EXTREMITY   Hook lying hip abduction isometrics with pilates ring, 5 sec holds 2 x 10   Reverse clamshells 2 x 10   SAQ 2 x 10  Supine ball squeeze with POSTERIOR PELVIC TILT x 20  Seated lateral side bending, 5 sec holds 1 x 10   SLR with pilates ring press down 2x 10 ea     standing              Morgan ER 2 x 10 x GTB              Morgan Abd 2 x 10 x GTB              OH lift with red wt ball (fwd, diagonals) x 10 ea              Seated trunk rotations 2 x 5 ea x YTB              Seated rows x 2 x 10 x GTB    Standing D2 flexion RTB 2 x 10      Patient received manual therapeutic technique for 15 minutes for improved soft tissue and joint mobility including:  Gentle SOFT TISSUE MOBILIZATION to left lateral hip musculature focusing on gluteus medius, piriformis, gluteus andrew, left lumbar erector spinae and quadratus lumborum      Patient received neuromuscular reeducation for 00 minutes for improved proprioception and balance including:  +Forward walking in \\ bars (without AD)  +Lateral walking in \\ bars (without AD)  +NBOS on foam 3 x 30"        Patient received therapeutic activities for 00 minutes for improved tolerance to functional activities including:  sit to stands (standard height chair + airex): 1x 10 AROM, 1 x 10 x pilates block between knees -->HHA for ascending, no HHA descending   Step ups on 4" step in \\ bars x 15 fwd, x 15 lat - limited fwd on L 2* c/o knee pain  Tandem walk in // bars 5 laps  Standing marching x20  Side stepping 5 laps           PATIENT EDUCATION AND HOME EXERCISES      Home Exercises Provided and Patient Education Provided      Education " provided:   PT educated pt on importance of compliance with their HEP this visit.   1/17/23 - updated HEP, GTB given     Written Home Exercises Provided: Patient instructed to cont prior HEP. Exercises were reviewed and Sonja was able to demonstrate them prior to the end of the session.  Sonja demonstrated fair  understanding of the education provided. See EMR under Patient Instructions for exercises provided during therapy sessions     ASSESSMENT   Patient tolerated treatment well with no increase in pain, reports fatigue with postural exercises and bilateral hip strengthening (Specifically in LEFT LOWER EXTREMITY). Patient requires tactile and verbal cues for proper performance of all exercises today. Patient continues to have high tissue reactivity to left gluteus medius which decreases to moderate tissue reactivity status post manual therapy techniques. No progressions at this time due to continued difficulty with current treatment program.      We will continue to progress core strength and functional ability while monitoring her low back pain.   Consider referral to [facility] for chronic LBP if no longer progressing.     Sonja Is progressing well towards her goals.   Pt prognosis is Good.      Pt will continue to benefit from skilled outpatient physical therapy to address the deficits listed in the problem list box on initial evaluation, provide pt/family education and to maximize pt's level of independence in the home and community environment.      Pt's spiritual, cultural and educational needs considered and pt agreeable to plan of care and goals.     Anticipated barriers to physical therapy: None     Goals:   Short Term Goals (6 Weeks):  1. Pt able to stand >=30 minutes with ADLs with mod difficulty. (Not met, progressing)  2. Pt able to walk >=30 minutes with household chores with mod difficulty. (Not met, progressing)  3.  Pt able to transfer in/out of chairs 8x in 30 seconds as demonstrating improving  functional strength. (Not met, progressing)  4. Pt able to ascend/descend curb, independently, 1 handrail, with mod difficulty. (Not met, progressing)  5. Pt to demonstrate improved functional ability with FOTO limitation <=39% disability. (Not met, progressing)     Long Term Goals (12 Weeks):  1. Pt able to stand >=30 minutes with ADLs with min difficulty. (Not met, progressing)  2. Pt able to walk >=30 minutes with household chores with min difficulty. (Not met, progressing)  3.  Pt able to transfer in/out of chairs 12x in 30 seconds as demonstrating improving functional strength. (Not met, progressing)  4. Pt able to ascend/descend 1 flight of stairs, independently, 1 handrail, with min difficulty. (Not met, progressing)  5. Pt to demonstrate improved functional ability with FOTO limitation <=30% disability. (Not met, progressing)     PLAN   Plan of care Certification: 12/15/2022 to 3/15/2023.     Improve B LE strength and balance           Sade Quigley

## 2023-02-22 ENCOUNTER — CLINICAL SUPPORT (OUTPATIENT)
Dept: REHABILITATION | Facility: OTHER | Age: 88
End: 2023-02-22
Payer: MEDICARE

## 2023-02-22 DIAGNOSIS — Z74.09 IMPAIRED FUNCTIONAL MOBILITY, BALANCE, GAIT, AND ENDURANCE: Primary | ICD-10-CM

## 2023-02-22 DIAGNOSIS — M54.50 CHRONIC LEFT-SIDED LOW BACK PAIN WITHOUT SCIATICA: ICD-10-CM

## 2023-02-22 DIAGNOSIS — M25.60 DECREASED RANGE OF MOTION WITH DECREASED STRENGTH: ICD-10-CM

## 2023-02-22 DIAGNOSIS — G89.29 CHRONIC LEFT-SIDED LOW BACK PAIN WITHOUT SCIATICA: ICD-10-CM

## 2023-02-22 DIAGNOSIS — R53.1 DECREASED RANGE OF MOTION WITH DECREASED STRENGTH: ICD-10-CM

## 2023-02-22 PROCEDURE — 97110 THERAPEUTIC EXERCISES: CPT | Mod: PN

## 2023-02-22 PROCEDURE — 97530 THERAPEUTIC ACTIVITIES: CPT | Mod: PN

## 2023-02-22 NOTE — PROGRESS NOTES
"[facility]   Physical Therapy Treatment Note      Name: Sonja Weeks  Clinic Number: [id number]     Therapy Diagnosis:   Encounter Diagnoses   Name Primary?    Impaired functional mobility, balance, gait, and endurance Yes    Decreased range of motion with decreased strength     Chronic left-sided low back pain without sciatica          Physician: Brennan Vazquez MD     Visit Date: 2/22/2023      Physician Orders: PT Eval and Treat   Medical Diagnosis from Referral: M15.9 (ICD-10-CM) - Primary osteoarthritis involving multiple joints   Evaluation Date: 12/15/2022  Authorization Period Expiration: 12/31/2022   Plan of Care Expiration: 3/15/2023  Progress Note Due: 3/15/2023  Visit # / Visits authorized: 15/ 20   FOTO: 1/3  PTA Visit #: 0/ 5     Precautions: Standard, cancer, and HTN, scoliosis, osteoporosis, R TKA, left knee arthroscopy, left reverse shoulder replacement      Time In: 1:45 pm   Time Out: 2:30 pm  Total Billable Time: 45 minutes     SUBJECTIVE   Pt reports: overall feeling better but continues to have pain on the L flank. "My posture is still off. It continues to hurt if I have to bend down and pick something up." Said she saw MD for [facility], who "doesn't think I need the program so I'll just stay here for a while longer."    She was NOT compliant with home exercise program. - "I only do them if I don't have therapy or my exercise class, and even then I only do them sometimes."  Response to previous treatment: "reports she feels good"  Functional change: None today. Continued pain and difficulty with L low back and side, difficulty achieving upright posture     Pain: 6/10  Location: L side of mid-low back and hip      OBJECTIVE      Updated on 2/22/2023 for updated tests/measurements.    Postural examination/scapula alignment: Rounded shoulder, Head forward, Posterior pelvic tilt, Abnormal trunk flexion, Slouched posture, Increased kyphosis, Decreased lordosis, and " "elevated iliac crest (R) -- UA to self correct without VC    Joint integrity: severe hypo throughout TSP/LSP, with mm guarding in mid-lower LSP with tight mm endfeel  LE ROM: WFL throughout     Flexibility:      Right               Left  SEE            Mod hypo        Mod hypo  FADDIR           Min hypo         Mod hypo  Ely's                 Mod felipe           Mod felipe       Lower Extremity Strength  Right LE   Left LE     Hip flexion: 3+/5 Hip flexion: 3+/5   Hip extension:  4-/5 Hip extension: 4/5   Hip abduction: 4/5 Hip abduction: 4/5   Hip adduction:  4+/5 Hip adduction:  4+/5   Knee Flexion 4+/5 Knee Flexion 4+/5   Knee Extension 4/5 Knee Extension 4/5           Evaluation   Timed Up and Go  <20", indep   Single Limb Stance R LE UA without LOB   Single Limb Stance L LE UA without LOB   Self Selected Walking Speed slow   Fast Walking Speed Slight change   30 second Chair Rise 5x - min change   Tandem stand  UA  <10" in staggered stance         Limitation/Restriction for FOTO NOC-neurodevelopmental disorder Survey     Therapist reviewed FOTO scores for Sonja Weeks on 12/15/2022.   FOTO documents entered into [employer] - see Media section.     Limitation Score: 49%            TREATMENT      Sonja received the bolded treatments listed below:       Patient received therapeutic exercises for 20 minutes for improved strength and AROM including:    +LTR x 20 x pink cook band  POSTERIOR PELVIC TILT 5 sec holds 1 x 10   TRANSVERSE ABDOMINUS marches 2 x 10   Bridges + glute set GTB x 3 min   STRAIGHT LEG RAISE with POSTERIOR PELVIC TILT 2 x 10   SIDE LYING open books 2 x 10   Clamshells GTB 2 x 15  Reverse Clamshells, towel roll between knees 2 x 15   SIDE LYING hip abduction 2 x 10 ACTIVE ASSISTED RANGE OF MOTION RIGHT LOWER EXTREMITY   Hook lying hip abduction isometrics with pilates ring, 5 sec holds 2 x 10   Reverse clamshells 2 x 10   SAQ 2 x 10  Supine ball squeeze with POSTERIOR PELVIC TILT x 20  Seated " "lateral side bending, 5 sec holds 1 x 10   SLR with pilates ring press down 2x 10 ea     standing              Morgan ER 2 x 10 x GTB              Morgan Abd 2 x 10 x GTB              OH lift with red wt ball (fwd, diagonals) x 10 ea              Seated trunk rotations 2 x 5 ea x YTB              Seated rows x 2 x 10 x GTB    Standing D2 flexion RTB 2 x 10      Patient received manual therapeutic technique for 00 minutes for improved soft tissue and joint mobility including:  Gentle SOFT TISSUE MOBILIZATION to left lateral hip musculature focusing on gluteus medius, piriformis, gluteus andrew, left lumbar erector spinae and quadratus lumborum      Patient received neuromuscular reeducation for 00 minutes for improved proprioception and balance including:  +Forward walking in \\ bars (without AD)  +Lateral walking in \\ bars (without AD)  +NBOS on foam 3 x 30"        Patient received therapeutic activities for 25 minutes for improved tolerance to functional activities including:  sit to stands: 3 x 10 AROM   +freemotion rows 3 x 10 x 13#  +freemotion lat pulls 3 x 10 x 20#    Step ups on 4" step in \\ bars x 15 fwd, x 15 lat - limited fwd on L 2* c/o knee pain  Tandem walk in // bars 5 laps  Standing marching x20  Side stepping 5 laps           PATIENT EDUCATION AND HOME EXERCISES      Home Exercises Provided and Patient Education Provided      Education provided:   PT educated pt on importance of compliance with their HEP this visit.   1/17/23 - updated HEP, GTB given     Written Home Exercises Provided: Patient instructed to cont prior HEP. Exercises were reviewed and Sonja was able to demonstrate them prior to the end of the session.  Sonja demonstrated fair  understanding of the education provided. See EMR under Patient Instructions for exercises provided during therapy sessions     ASSESSMENT     Reassessment performed today as pt reports sx are relatively the same. Pt presents with continued flexed posture, with " decreased hip and trunk strength, poor postural endurance that continues to facilitate c/o lateral trunk pain. Despite continued impairments, pt presents with increased independence with sit to stand transfers and ambulation with min pain. Slow progression possibly due to lack of compliance and consistency with HEP at home.   Plan to continue PT x 3 weeks, with progression of thoracic spine mobility, scapular and trunk strength and stabilization for progression of upright postural awareness and endurance. If pt no longer making progress and reports feeling relatively the same, consider updated HEP and discharge, and refer back to MD for further diagnostic care.  Pt would still be a good candidate for [facility] in the future.     Sonja Is progressing well towards her goals.   Pt prognosis is Good.      Pt will continue to benefit from skilled outpatient physical therapy to address the deficits listed in the problem list box on initial evaluation, provide pt/family education and to maximize pt's level of independence in the home and community environment.      Pt's spiritual, cultural and educational needs considered and pt agreeable to plan of care and goals.     Anticipated barriers to physical therapy: None     Goals:   Short Term Goals (6 Weeks):  1. Pt able to stand >=30 minutes with ADLs with mod difficulty. (Not met, progressing)  2. Pt able to walk >=30 minutes with household chores with mod difficulty. (Not met, progressing)  3.  Pt able to transfer in/out of chairs 8x in 30 seconds as demonstrating improving functional strength. (Not met, progressing)  4. Pt able to ascend/descend curb, independently, 1 handrail, with mod difficulty. (Not met, progressing)  5. Pt to demonstrate improved functional ability with FOTO limitation <=39% disability. (Not met, progressing)     Long Term Goals (12 Weeks):  1. Pt able to stand >=30 minutes with ADLs with min difficulty. (Not met, progressing)  2. Pt able to walk >=30  minutes with household chores with min difficulty. (Not met, progressing)  3.  Pt able to transfer in/out of chairs 12x in 30 seconds as demonstrating improving functional strength. (Not met, progressing)  4. Pt able to ascend/descend 1 flight of stairs, independently, 1 handrail, with min difficulty. (Not met, progressing)  5. Pt to demonstrate improved functional ability with FOTO limitation <=30% disability. (Not met, progressing)     PLAN   Plan of care Certification: 12/15/2022 to 3/15/2023.     Improve B LE strength and balance    Jing Menchaca, PT

## 2023-02-27 ENCOUNTER — HOSPITAL ENCOUNTER (OUTPATIENT)
Dept: RADIOLOGY | Facility: OTHER | Age: 88
Discharge: HOME | End: 2023-02-27
Attending: INTERNAL MEDICINE
Payer: MEDICARE

## 2023-02-27 DIAGNOSIS — R60.9 EDEMA, UNSPECIFIED TYPE: ICD-10-CM

## 2023-02-27 PROCEDURE — 73562 XR KNEE 3 VIEW LEFT: ICD-10-PCS | Mod: 26,LT,, | Performed by: RADIOLOGY

## 2023-02-27 PROCEDURE — 73562 X-RAY EXAM OF KNEE 3: CPT | Mod: TC,FY,LT

## 2023-02-27 PROCEDURE — 73562 X-RAY EXAM OF KNEE 3: CPT | Mod: 26,LT,, | Performed by: RADIOLOGY

## 2023-03-02 ENCOUNTER — CLINICAL SUPPORT (OUTPATIENT)
Dept: REHABILITATION | Facility: OTHER | Age: 88
End: 2023-03-02
Payer: MEDICARE

## 2023-03-02 DIAGNOSIS — M25.60 DECREASED RANGE OF MOTION WITH DECREASED STRENGTH: ICD-10-CM

## 2023-03-02 DIAGNOSIS — Z74.09 IMPAIRED FUNCTIONAL MOBILITY, BALANCE, GAIT, AND ENDURANCE: Primary | ICD-10-CM

## 2023-03-02 DIAGNOSIS — G89.29 CHRONIC LEFT-SIDED LOW BACK PAIN WITHOUT SCIATICA: ICD-10-CM

## 2023-03-02 DIAGNOSIS — M54.50 CHRONIC LEFT-SIDED LOW BACK PAIN WITHOUT SCIATICA: ICD-10-CM

## 2023-03-02 DIAGNOSIS — R53.1 DECREASED RANGE OF MOTION WITH DECREASED STRENGTH: ICD-10-CM

## 2023-03-02 PROCEDURE — 97110 THERAPEUTIC EXERCISES: CPT | Mod: PN | Performed by: PHYSICAL THERAPIST

## 2023-03-02 PROCEDURE — 97530 THERAPEUTIC ACTIVITIES: CPT | Mod: PN | Performed by: PHYSICAL THERAPIST

## 2023-03-02 NOTE — PROGRESS NOTES
"[facility]   Physical Therapy Treatment Note      Name: Sonja Weeks  Clinic Number: [id number]     Therapy Diagnosis:   Encounter Diagnoses   Name Primary?    Impaired functional mobility, balance, gait, and endurance Yes    Decreased range of motion with decreased strength     Chronic left-sided low back pain without sciatica          Physician: Brennan Vazquez MD     Visit Date: 3/2/2023      Physician Orders: PT Eval and Treat   Medical Diagnosis from Referral: M15.9 (ICD-10-CM) - Primary osteoarthritis involving multiple joints   Evaluation Date: 12/15/2022  Authorization Period Expiration: 12/31/2022   Plan of Care Expiration: 3/15/2023  Progress Note Due: 3/15/2023  Visit # / Visits authorized: 18/ 20   FOTO: 1/3  PTA Visit #: 0/ 5     Precautions: Standard, cancer, and HTN, scoliosis, osteoporosis, R TKA, left knee arthroscopy, left reverse shoulder replacement      Time In: 1125 (late arrival)   Time Out: 1225  Total Billable Time: 60 minutes     SUBJECTIVE   Pt reports: she's having a rough day 2* the elevators being broken at her building. She says her L side is still hurting a lot, and her L knee has been acting up recently as well.     She was NOT compliant with home exercise program. - "I only do them if I don't have therapy or my exercise class, and even then I only do them sometimes."  Response to previous treatment: "reports she feels good"  Functional change: None today. Continued pain and difficulty with L low back and side, difficulty achieving upright posture     Pain: 6/10  Location: L side of mid-low back and hip      OBJECTIVE      Updated on 2/22/2023 .    Postural examination/scapula alignment: Rounded shoulder, Head forward, Posterior pelvic tilt, Abnormal trunk flexion, Slouched posture, Increased kyphosis, Decreased lordosis, and elevated iliac crest (R) -- UA to self correct without VC    Joint integrity: severe hypo throughout TSP/LSP, with mm guarding " "in mid-lower LSP with tight mm endfeel  LE ROM: WFL throughout     Flexibility:      Right               Left  SEE            Mod hypo        Mod hypo  FADDIR           Min hypo         Mod hypo  Ely's                 Mod felipe           Mod felipe       Lower Extremity Strength  Right LE   Left LE     Hip flexion: 3+/5 Hip flexion: 3+/5   Hip extension:  4-/5 Hip extension: 4/5   Hip abduction: 4/5 Hip abduction: 4/5   Hip adduction:  4+/5 Hip adduction:  4+/5   Knee Flexion 4+/5 Knee Flexion 4+/5   Knee Extension 4/5 Knee Extension 4/5           Evaluation   Timed Up and Go  <20", indep   Single Limb Stance R LE UA without LOB   Single Limb Stance L LE UA without LOB   Self Selected Walking Speed slow   Fast Walking Speed Slight change   30 second Chair Rise 5x - min change   Tandem stand  UA  <10" in staggered stance         Limitation/Restriction for FOTO NOC-neurodevelopmental disorder Survey     Therapist reviewed FOTO scores for Sonja Weeks on 12/15/2022.   FOTO documents entered into [employer] - see Media section.     Limitation Score: 49%            TREATMENT      Sonja received the bolded treatments listed below:       Patient received therapeutic exercises for 35 minutes for improved strength and AROM including:    LTR x 20 x pink cook band  POSTERIOR PELVIC TILT 5 sec holds 1 x 10   TRANSVERSE ABDOMINUS marches 2 x 10   Bridges + glute set GTB x 3 min   STRAIGHT LEG RAISE with POSTERIOR PELVIC TILT 2 x 10   SIDE LYING open books 2 x 10   Clamshells GTB 2 x 15  Reverse Clamshells, towel roll between knees 2 x 15   SIDE LYING hip abduction 2 x 10 ACTIVE ASSISTED RANGE OF MOTION RIGHT LOWER EXTREMITY   Hook lying hip abduction isometrics with pilates ring, 5 sec holds 2 x 10   Reverse clamshells 2 x 10   SAQ 2 x 10  Supine ball squeeze with POSTERIOR PELVIC TILT x 20  Seated lateral side bending, 5 sec holds 1 x 10   SLR with pilates ring press down 2x 10 ea     standing              Morgan ER 2 x 10 x GTB     " "         Morgan Abd 2 x 10 x GTB              OH lift with red wt ball (fwd, diagonals) x 10 ea              Seated trunk rotations 2 x 5 ea x YTB              Seated rows x 2 x 10 x GTB    Standing D2 flexion RTB 2 x 10      Patient received manual therapeutic technique for 00 minutes for improved soft tissue and joint mobility including:  Gentle SOFT TISSUE MOBILIZATION to left lateral hip musculature focusing on gluteus medius, piriformis, gluteus andrew, left lumbar erector spinae and quadratus lumborum      Patient received neuromuscular reeducation for 00 minutes for improved proprioception and balance including:  +Forward walking in \\ bars (without AD)  +Lateral walking in \\ bars (without AD)  +NBOS on foam 3 x 30"        Patient received therapeutic activities for 25 minutes for improved tolerance to functional activities including:  sit to stands: 3 x 10 AROM   +freemotion rows 2 x 15 x 13#  +freemotion lat pulls 2 x 15 x 20#    Step ups on 4" step in \\ bars x 15 fwd, x 15 lat - limited fwd on L 2* c/o knee pain  Tandem walk in // bars 5 laps  Standing marching x20  Side stepping 5 laps           PATIENT EDUCATION AND HOME EXERCISES      Home Exercises Provided and Patient Education Provided      Education provided:   PT educated pt on importance of compliance with their HEP this visit.   1/17/23 - updated HEP, GTB given     Written Home Exercises Provided: Patient instructed to cont prior HEP. Exercises were reviewed and Sonja was able to demonstrate them prior to the end of the session.  Sonja demonstrated fair  understanding of the education provided. See EMR under Patient Instructions for exercises provided during therapy sessions     ASSESSMENT     Very slow pacing of exercises today, rest breaks required particularly during sit to stands. Able to increase repetitions to 2 x 15 with Freemotion activities, min c/o fatigue at end of set but able to complete.      Sonja Is progressing well towards " her goals.   Pt prognosis is Good.      Pt will continue to benefit from skilled outpatient physical therapy to address the deficits listed in the problem list box on initial evaluation, provide pt/family education and to maximize pt's level of independence in the home and community environment.      Pt's spiritual, cultural and educational needs considered and pt agreeable to plan of care and goals.     Anticipated barriers to physical therapy: None     Goals:   Short Term Goals (6 Weeks):  1. Pt able to stand >=30 minutes with ADLs with mod difficulty. (Not met, progressing)  2. Pt able to walk >=30 minutes with household chores with mod difficulty. (Not met, progressing)  3.  Pt able to transfer in/out of chairs 8x in 30 seconds as demonstrating improving functional strength. (Not met, progressing)  4. Pt able to ascend/descend curb, independently, 1 handrail, with mod difficulty. (Not met, progressing)  5. Pt to demonstrate improved functional ability with FOTO limitation <=39% disability. (Not met, progressing)     Long Term Goals (12 Weeks):  1. Pt able to stand >=30 minutes with ADLs with min difficulty. (Not met, progressing)  2. Pt able to walk >=30 minutes with household chores with min difficulty. (Not met, progressing)  3.  Pt able to transfer in/out of chairs 12x in 30 seconds as demonstrating improving functional strength. (Not met, progressing)  4. Pt able to ascend/descend 1 flight of stairs, independently, 1 handrail, with min difficulty. (Not met, progressing)  5. Pt to demonstrate improved functional ability with FOTO limitation <=30% disability. (Not met, progressing)     PLAN   Plan of care Certification: 12/15/2022 to 3/15/2023.     Improve B LE strength and balance    Krista Vigil, PT

## 2023-03-07 ENCOUNTER — CLINICAL SUPPORT (OUTPATIENT)
Dept: REHABILITATION | Facility: OTHER | Age: 88
End: 2023-03-07
Payer: MEDICARE

## 2023-03-07 DIAGNOSIS — Z74.09 IMPAIRED FUNCTIONAL MOBILITY, BALANCE, GAIT, AND ENDURANCE: Primary | ICD-10-CM

## 2023-03-07 DIAGNOSIS — R53.1 DECREASED RANGE OF MOTION WITH DECREASED STRENGTH: ICD-10-CM

## 2023-03-07 DIAGNOSIS — M54.50 CHRONIC LEFT-SIDED LOW BACK PAIN WITHOUT SCIATICA: ICD-10-CM

## 2023-03-07 DIAGNOSIS — M25.60 DECREASED RANGE OF MOTION WITH DECREASED STRENGTH: ICD-10-CM

## 2023-03-07 DIAGNOSIS — G89.29 CHRONIC LEFT-SIDED LOW BACK PAIN WITHOUT SCIATICA: ICD-10-CM

## 2023-03-07 PROCEDURE — 97530 THERAPEUTIC ACTIVITIES: CPT | Mod: PN

## 2023-03-07 PROCEDURE — 97110 THERAPEUTIC EXERCISES: CPT | Mod: PN

## 2023-03-07 NOTE — PROGRESS NOTES
"[facility]   Physical Therapy Treatment Note      Name: Sonja Weeks  Clinic Number: [id number]     Therapy Diagnosis:   Encounter Diagnoses   Name Primary?    Impaired functional mobility, balance, gait, and endurance Yes    Decreased range of motion with decreased strength     Chronic left-sided low back pain without sciatica          Physician: Brennan Vazquez MD     Visit Date: 3/7/2023      Physician Orders: PT Eval and Treat   Medical Diagnosis from Referral: M15.9 (ICD-10-CM) - Primary osteoarthritis involving multiple joints   Evaluation Date: 12/15/2022  Authorization Period Expiration: 12/31/2022   Plan of Care Expiration: 3/15/2023  Progress Note Due: 3/15/2023  Visit # / Visits authorized: 19/ 20   FOTO: 1/3  PTA Visit #: 0/ 5     Precautions: Standard, cancer, and HTN, scoliosis, osteoporosis, R TKA, left knee arthroscopy, left reverse shoulder replacement      Time In: 1122  Time Out: 12:00  Total Billable Time: 38  minutes     SUBJECTIVE   Pt reports: she stopped doing exercise classes at [facility] over the past few weeks. Says she still has pain but knows taht if she straights her posture then she feels better.    Reports seeing Dr. Garcia at [facility] for her knee pain.    She was NOT compliant with home exercise program. - "I only do them if I don't have therapy or my exercise class, and even then I only do them sometimes."  Response to previous treatment: "reports she feels good"  Functional change: None today. Continued pain and difficulty with L low back and side, difficulty achieving upright posture     Pain: 6/10  Location: L side of mid-low back and hip      OBJECTIVE      Updated on 2/22/2023 .    Postural examination/scapula alignment: Rounded shoulder, Head forward, Posterior pelvic tilt, Abnormal trunk flexion, Slouched posture, Increased kyphosis, Decreased lordosis, and elevated iliac crest (R) -- UA to self correct without " "VC    Joint integrity: severe hypo throughout TSP/LSP, with mm guarding in mid-lower LSP with tight mm endfeel  LE ROM: WFL throughout     Flexibility:      Right               Left  SEE            Mod hypo        Mod hypo  FADDIR           Min hypo         Mod hypo  Ely's                 Mod felipe           Mod felipe       Lower Extremity Strength updated 3/7/23  Right LE   Left LE     Hip flexion: 4/5 Hip flexion: 4/5   Hip extension:  4/5 Hip extension: 4/5   Hip abduction: 4+/5 Hip abduction: 4+/5   Hip adduction:  5/5 Hip adduction:  5/5   Knee Flexion 5/5 Knee Flexion 5/5   Knee Extension 4+/5 Knee Extension 4+/5         Updated 3/7/23    Evaluation   Timed Up and Go  <20", indep   Single Limb Stance R LE UA without LOB   Single Limb Stance L LE UA without LOB   Self Selected Walking Speed slow   Fast Walking Speed Slight change   30 second Chair Rise 4x (previous 5x)   Tandem stand  UA  <10" in staggered stance         Limitation/Restriction for FOTO NOC-neurodevelopmental disorder Survey     Therapist reviewed FOTO scores for Sonja Weeks on 12/15/2022.   FOTO documents entered into [employer] - see Media section.     Limitation Score: 49%            TREATMENT      Sonja received the bolded treatments listed below:       Patient received therapeutic exercises for 23 minutes for improved strength and AROM including:    LTR x 20 x pink cook band  Bridges + glute set GTB x 3 min   STRAIGHT LEG RAISE with POSTERIOR PELVIC TILT 2 x 10   Clamshells GTB x 3 min ea side     Patient received manual therapeutic technique for 00 minutes for improved soft tissue and joint mobility including:  Gentle SOFT TISSUE MOBILIZATION to left lateral hip musculature focusing on gluteus medius, piriformis, gluteus andrew, left lumbar erector spinae and quadratus lumborum      Patient received neuromuscular reeducation for 00 minutes for improved proprioception and balance including:  +Forward walking in \\ bars (without " "AD)  +Lateral walking in \\ bars (without AD)  +NBOS on foam 3 x 30"        Patient received therapeutic activities for 15 minutes for improved tolerance to functional activities including:  sit to stands: 3 x 10 AROM   freemotion rows 2 x 15 x 13#  freemotion lat pulls 2 x 15 x 20#         PATIENT EDUCATION AND HOME EXERCISES      Home Exercises Provided and Patient Education Provided      Education provided:   PT educated pt on importance of compliance with their HEP this visit.   1/17/23 - updated HEP, GTB given     Written Home Exercises Provided: Patient instructed to cont prior HEP. Exercises were reviewed and Sonja was able to demonstrate them prior to the end of the session.  Sonja demonstrated fair  understanding of the education provided. See EMR under Patient Instructions for exercises provided during therapy sessions     ASSESSMENT     Reassessment performed today with pt presenting iwth good improvements in global LE strength that are WFL to allow for independence with ADLs. Despite improvements in strength pt remains limited with endurance for repetitive ADLS, seen with min improvement in repetitions with 30" sit to stand test. As POC expires next week and pt has achieved functional goals of be able to get up from a chair without pain, plan on discharge by the end of next week. Pt verbalized agreement with plan. Encouraged pt to re-start strength and conditioning program classes at assisted living facility to maintain functional mobility, motivation, as pt has poor compliance with previously given HEP, difficulty with memory/recall.    Sonja Is progressing well towards her goals.   Pt prognosis is Good.      Pt will continue to benefit from skilled outpatient physical therapy to address the deficits listed in the problem list box on initial evaluation, provide pt/family education and to maximize pt's level of independence in the home and community environment.      Pt's spiritual, cultural and " educational needs considered and pt agreeable to plan of care and goals.     Anticipated barriers to physical therapy: None     Goals:   Short Term Goals (6 Weeks):  1. Pt able to stand >=30 minutes with ADLs with mod difficulty. (Not met, progressing)  2. Pt able to walk >=30 minutes with household chores with mod difficulty. (Not met, progressing)  3.  Pt able to transfer in/out of chairs 8x in 30 seconds as demonstrating improving functional strength. (Not met, progressing)  4. Pt able to ascend/descend curb, independently, 1 handrail, with mod difficulty. (Not met, progressing)  5. Pt to demonstrate improved functional ability with FOTO limitation <=39% disability. (Not met, progressing)     Long Term Goals (12 Weeks):  1. Pt able to stand >=30 minutes with ADLs with min difficulty. (Not met, progressing)  2. Pt able to walk >=30 minutes with household chores with min difficulty. (Not met, progressing)  3.  Pt able to transfer in/out of chairs 12x in 30 seconds as demonstrating improving functional strength. (Not met, progressing)  4. Pt able to ascend/descend 1 flight of stairs, independently, 1 handrail, with min difficulty. (Not met, progressing)  5. Pt to demonstrate improved functional ability with FOTO limitation <=30% disability. (Not met, progressing)     PLAN   Plan of care Certification: 12/15/2022 to 3/15/2023.     Improve B LE strength and balance    Jing Menchaca, PT

## 2023-03-08 ENCOUNTER — INFUSION (OUTPATIENT)
Dept: INFUSION THERAPY | Facility: OTHER | Age: 88
End: 2023-03-08
Payer: MEDICARE

## 2023-03-08 VITALS
RESPIRATION RATE: 18 BRPM | OXYGEN SATURATION: 97 % | DIASTOLIC BLOOD PRESSURE: 64 MMHG | SYSTOLIC BLOOD PRESSURE: 142 MMHG | HEART RATE: 60 BPM

## 2023-03-08 DIAGNOSIS — M81.0 AGE-RELATED OSTEOPOROSIS WITHOUT CURRENT PATHOLOGICAL FRACTURE: Primary | ICD-10-CM

## 2023-03-08 PROCEDURE — 96372 THER/PROPH/DIAG INJ SC/IM: CPT

## 2023-03-08 PROCEDURE — 63600175 PHARM REV CODE 636 W HCPCS: Mod: JZ,JG | Performed by: INTERNAL MEDICINE

## 2023-03-08 RX ADMIN — DENOSUMAB 60 MG: 60 INJECTION SUBCUTANEOUS at 02:03

## 2023-03-08 NOTE — PLAN OF CARE
Vital Signs Stable, No apparent distress noted; Pt tolerated _Prolia w/o difficulty.  No bleeding,swelling or drainage noted to the site;bandaid applied   Discharge instructions given;all questions answered ;Pt verbalizes understanding.   Next appointment scheduled and sent via [facility]; ambulated from clinic in [facility], accompanied by family/friend

## 2023-03-09 ENCOUNTER — CLINICAL SUPPORT (OUTPATIENT)
Dept: REHABILITATION | Facility: OTHER | Age: 88
End: 2023-03-09
Payer: MEDICARE

## 2023-03-09 DIAGNOSIS — R53.1 DECREASED RANGE OF MOTION WITH DECREASED STRENGTH: ICD-10-CM

## 2023-03-09 DIAGNOSIS — G89.29 CHRONIC LEFT-SIDED LOW BACK PAIN WITHOUT SCIATICA: ICD-10-CM

## 2023-03-09 DIAGNOSIS — M25.60 DECREASED RANGE OF MOTION WITH DECREASED STRENGTH: ICD-10-CM

## 2023-03-09 DIAGNOSIS — Z74.09 IMPAIRED FUNCTIONAL MOBILITY, BALANCE, GAIT, AND ENDURANCE: Primary | ICD-10-CM

## 2023-03-09 DIAGNOSIS — M54.50 CHRONIC LEFT-SIDED LOW BACK PAIN WITHOUT SCIATICA: ICD-10-CM

## 2023-03-09 PROCEDURE — 97110 THERAPEUTIC EXERCISES: CPT | Mod: PN

## 2023-03-09 PROCEDURE — 97530 THERAPEUTIC ACTIVITIES: CPT | Mod: PN

## 2023-03-09 NOTE — PROGRESS NOTES
"[facility]   Physical Therapy Treatment Note      Name: Sonja Weeks  Clinic Number: [id number]     Therapy Diagnosis:   Encounter Diagnoses   Name Primary?    Impaired functional mobility, balance, gait, and endurance Yes    Decreased range of motion with decreased strength     Chronic left-sided low back pain without sciatica          Physician: Brennan Vazquez MD     Visit Date: 3/9/2023      Physician Orders: PT Eval and Treat   Medical Diagnosis from Referral: M15.9 (ICD-10-CM) - Primary osteoarthritis involving multiple joints   Evaluation Date: 12/15/2022  Authorization Period Expiration: 12/31/2022   Plan of Care Expiration: 3/15/2023  Progress Note Due: 3/15/2023  Visit # / Visits authorized: 20/ 20   FOTO: 1/3  PTA Visit #: 0/ 5     Precautions: Standard, cancer, and HTN, scoliosis, osteoporosis, R TKA, left knee arthroscopy, left reverse shoulder replacement      Time In: 1115  Time Out: 12:00  Total Billable Time: 45 minutes     SUBJECTIVE   Pt reports: no new complaints since previous visit. Feeling about the same.    Reports seeing Dr. Garcia at [facility] for her knee pain.    She was NOT compliant with home exercise program. - "I only do them if I don't have therapy or my exercise class, and even then I only do them sometimes."  Response to previous treatment: "reports she feels good"  Functional change: None today. Continued pain and difficulty with L low back and side, difficulty achieving upright posture     Pain: 6/10  Location: L side of mid-low back and hip      OBJECTIVE      Updated on 2/22/2023 .    Postural examination/scapula alignment: Rounded shoulder, Head forward, Posterior pelvic tilt, Abnormal trunk flexion, Slouched posture, Increased kyphosis, Decreased lordosis, and elevated iliac crest (R) -- UA to self correct without VC    Joint integrity: severe hypo throughout TSP/LSP, with mm guarding in mid-lower LSP with tight mm " "endfeel  LE ROM: WFL throughout     Flexibility:      Right               Left  SEE            Mod hypo        Mod hypo  FADDIR           Min hypo         Mod hypo  Ely's                 Mod felipe           Mod felipe       Lower Extremity Strength updated 3/7/23  Right LE   Left LE     Hip flexion: 4/5 Hip flexion: 4/5   Hip extension:  4/5 Hip extension: 4/5   Hip abduction: 4+/5 Hip abduction: 4+/5   Hip adduction:  5/5 Hip adduction:  5/5   Knee Flexion 5/5 Knee Flexion 5/5   Knee Extension 4+/5 Knee Extension 4+/5         Updated 3/7/23    Evaluation   Timed Up and Go  <20", indep   Single Limb Stance R LE UA without LOB   Single Limb Stance L LE UA without LOB   Self Selected Walking Speed slow   Fast Walking Speed Slight change   30 second Chair Rise 4x (previous 5x)   Tandem stand  UA  <10" in staggered stance         Limitation/Restriction for FOTO NOC-neurodevelopmental disorder Survey     Therapist reviewed FOTO scores for Sonja Weeks on 12/15/2022.   FOTO documents entered into [employer] - see Media section.     Limitation Score: 49%            TREATMENT      Sonja received the bolded treatments listed below:       Patient received therapeutic exercises for 25 minutes for improved strength and AROM including:    LTR x 20 x pink cook band  Bridges + glute set GTB x 3 min   STRAIGHT LEG RAISE with POSTERIOR PELVIC TILT 2 x 10   Clamshells GTB x 3 min ea side     Patient received manual therapeutic technique for 00 minutes for improved soft tissue and joint mobility including:  Gentle SOFT TISSUE MOBILIZATION to left lateral hip musculature focusing on gluteus medius, piriformis, gluteus andrew, left lumbar erector spinae and quadratus lumborum      Patient received neuromuscular reeducation for 00 minutes for improved proprioception and balance including:  +Forward walking in \\ bars (without AD)  +Lateral walking in \\ bars (without AD)  +NBOS on foam 3 x 30"        Patient received therapeutic " activities for 20 minutes for improved tolerance to functional activities including:  sit to stands: 2 x 10 AROM   freemotion rows 2 x 15 x 10# - dbl handle  freemotion lat pulls 2 x 15 x 17# - lat bar with wide          PATIENT EDUCATION AND HOME EXERCISES      Home Exercises Provided and Patient Education Provided      Education provided:   PT educated pt on importance of compliance with their HEP this visit.   1/17/23 - updated HEP, GTB given     Written Home Exercises Provided: Patient instructed to cont prior HEP. Exercises were reviewed and Sonja was able to demonstrate them prior to the end of the session.  Sonja demonstrated fair  understanding of the education provided. See EMR under Patient Instructions for exercises provided during therapy sessions     ASSESSMENT     Reduced repetitions today with sit to stands as pt notes increased fatigue. Reduced resistance with freemotion pulls as pt has increased difficulty today. Good tolerance with overall program.    Plan to d/c by end of next week with progression of HEP (with shortened list to encourage compliance). Encouraged pt to re-start strength and conditioning program classes at assisted living facility to maintain functional mobility, motivation, as pt has poor compliance with previously given HEP, difficulty with memory/recall.    Sonja Is progressing well towards her goals.   Pt prognosis is Good.      Pt will continue to benefit from skilled outpatient physical therapy to address the deficits listed in the problem list box on initial evaluation, provide pt/family education and to maximize pt's level of independence in the home and community environment.      Pt's spiritual, cultural and educational needs considered and pt agreeable to plan of care and goals.     Anticipated barriers to physical therapy: None     Goals:   Short Term Goals (6 Weeks):  1. Pt able to stand >=30 minutes with ADLs with mod difficulty. (Not met, progressing)  2. Pt  able to walk >=30 minutes with household chores with mod difficulty. (Not met, progressing)  3.  Pt able to transfer in/out of chairs 8x in 30 seconds as demonstrating improving functional strength. (Not met, progressing)  4. Pt able to ascend/descend curb, independently, 1 handrail, with mod difficulty. (Not met, progressing)  5. Pt to demonstrate improved functional ability with FOTO limitation <=39% disability. (Not met, progressing)     Long Term Goals (12 Weeks):  1. Pt able to stand >=30 minutes with ADLs with min difficulty. (Not met, progressing)  2. Pt able to walk >=30 minutes with household chores with min difficulty. (Not met, progressing)  3.  Pt able to transfer in/out of chairs 12x in 30 seconds as demonstrating improving functional strength. (Not met, progressing)  4. Pt able to ascend/descend 1 flight of stairs, independently, 1 handrail, with min difficulty. (Not met, progressing)  5. Pt to demonstrate improved functional ability with FOTO limitation <=30% disability. (Not met, progressing)     PLAN   Plan of care Certification: 12/15/2022 to 3/15/2023.     Improve B LE strength and balance    Jing Menchaca, PT

## 2023-03-13 NOTE — PROGRESS NOTES
"[facility]   Physical Therapy Treatment Note      Name: Sonja Weeks  Clinic Number: [id number]     Therapy Diagnosis:   Encounter Diagnoses   Name Primary?    Impaired functional mobility, balance, gait, and endurance Yes    Decreased range of motion with decreased strength     Chronic left-sided low back pain without sciatica          Physician: Brennan Vazquez MD     Visit Date: 3/13/2023      Physician Orders: PT Eval and Treat   Medical Diagnosis from Referral: M15.9 (ICD-10-CM) - Primary osteoarthritis involving multiple joints   Evaluation Date: 12/15/2022  Authorization Period Expiration: 12/31/2022   Plan of Care Expiration: 3/15/2023  Progress Note Due: 3/15/2023  Visit # / Visits authorized: 22/ 23   FOTO: 1/3  PTA Visit #: 0/ 5     Precautions: Standard, cancer, and HTN, scoliosis, osteoporosis, R TKA, left knee arthroscopy, left reverse shoulder replacement      Time In: 1120  Time Out: 1205  Total Billable Time: 45 minutes     SUBJECTIVE   Pt reports: continues with pain to back and hip, no significant change.     She was NOT compliant with home exercise program. - "I only do them if I don't have therapy or my exercise class, and even then I only do them sometimes."  Response to previous treatment: "reports she feels good"  Functional change: None today. Continued pain and difficulty with L low back and side, difficulty achieving upright posture     Pain: 6/10  Location: L side of mid-low back and hip      OBJECTIVE      Updated on 2/22/2023 .    Postural examination/scapula alignment: Rounded shoulder, Head forward, Posterior pelvic tilt, Abnormal trunk flexion, Slouched posture, Increased kyphosis, Decreased lordosis, and elevated iliac crest (R) -- UA to self correct without VC    Joint integrity: severe hypo throughout TSP/LSP, with mm guarding in mid-lower LSP with tight mm endfeel  LE ROM: WFL throughout     Flexibility:      Right               Left  SEE       " "     Mod hypo        Mod hypo  FADDIR           Min hypo         Mod hypo  Ely's                 Mod felipe           Mod felipe       Lower Extremity Strength updated 3/7/23  Right LE   Left LE     Hip flexion: 4/5 Hip flexion: 4/5   Hip extension:  4/5 Hip extension: 4/5   Hip abduction: 4+/5 Hip abduction: 4+/5   Hip adduction:  5/5 Hip adduction:  5/5   Knee Flexion 5/5 Knee Flexion 5/5   Knee Extension 4+/5 Knee Extension 4+/5         Updated 3/7/23    Evaluation   Timed Up and Go  <20", indep   Single Limb Stance R LE UA without LOB   Single Limb Stance L LE UA without LOB   Self Selected Walking Speed slow   Fast Walking Speed Slight change   30 second Chair Rise 4x (previous 5x)   Tandem stand  UA  <10" in staggered stance         Limitation/Restriction for FOTO NOC-neurodevelopmental disorder Survey     Therapist reviewed FOTO scores for Sonja Weeks on 12/15/2022.   FOTO documents entered into [employer] - see Media section.     Limitation Score: 49%            TREATMENT      Sonja received the bolded treatments listed below:       Patient received therapeutic exercises for 25 minutes for improved strength and AROM including:    LTR x 20 x pink cook band  Bridges + glute set GTB x 3 min   STRAIGHT LEG RAISE with POSTERIOR PELVIC TILT 2 x 10   Clamshells GTB x 3 min ea side     Patient received manual therapeutic technique for 00 minutes for improved soft tissue and joint mobility including:  Gentle SOFT TISSUE MOBILIZATION to left lateral hip musculature focusing on gluteus medius, piriformis, gluteus andrew, left lumbar erector spinae and quadratus lumborum      Patient received neuromuscular reeducation for 00 minutes for improved proprioception and balance including:  +Forward walking in \\ bars (without AD)  +Lateral walking in \\ bars (without AD)  +NBOS on foam 3 x 30"        Patient received therapeutic activities for 20 minutes for improved tolerance to functional activities including:  sit to stands: " 2 x 10 AROM   freemotion rows 2 x 15 x 10# - dbl handle  freemotion lat pulls 2 x 15 x 17# - lat bar with wide          PATIENT EDUCATION AND HOME EXERCISES      Home Exercises Provided and Patient Education Provided      Education provided:   PT educated pt on importance of compliance with their HEP this visit.   1/17/23 - updated HEP, GTB given     Written Home Exercises Provided: Patient instructed to cont prior HEP. Exercises were reviewed and Sonja was able to demonstrate them prior to the end of the session.  Sonja demonstrated fair  understanding of the education provided. See EMR under Patient Instructions for exercises provided during therapy sessions     ASSESSMENT     Overall good tolerance to treatment today. Some discomfort lying on L side for clamshells, cushion placed under hip and pt able to complete activity. Tactile cuing with cable exercises to decrease UT recruitment.     Plan to d/c by end of this week with progression of HEP (with shortened list to encourage compliance). Encouraged pt to re-start strength and conditioning program classes at assisted living facility to maintain functional mobility, motivation, as pt has poor compliance with previously given HEP, difficulty with memory/recall.    Sonja Is progressing well towards her goals.   Pt prognosis is Good.      Pt will continue to benefit from skilled outpatient physical therapy to address the deficits listed in the problem list box on initial evaluation, provide pt/family education and to maximize pt's level of independence in the home and community environment.      Pt's spiritual, cultural and educational needs considered and pt agreeable to plan of care and goals.     Anticipated barriers to physical therapy: None     Goals:   Short Term Goals (6 Weeks):  1. Pt able to stand >=30 minutes with ADLs with mod difficulty. (Not met, progressing)  2. Pt able to walk >=30 minutes with household chores with mod difficulty. (Not met,  progressing)  3.  Pt able to transfer in/out of chairs 8x in 30 seconds as demonstrating improving functional strength. (Not met, progressing)  4. Pt able to ascend/descend curb, independently, 1 handrail, with mod difficulty. (Not met, progressing)  5. Pt to demonstrate improved functional ability with FOTO limitation <=39% disability. (Not met, progressing)     Long Term Goals (12 Weeks):  1. Pt able to stand >=30 minutes with ADLs with min difficulty. (Not met, progressing)  2. Pt able to walk >=30 minutes with household chores with min difficulty. (Not met, progressing)  3.  Pt able to transfer in/out of chairs 12x in 30 seconds as demonstrating improving functional strength. (Not met, progressing)  4. Pt able to ascend/descend 1 flight of stairs, independently, 1 handrail, with min difficulty. (Not met, progressing)  5. Pt to demonstrate improved functional ability with FOTO limitation <=30% disability. (Not met, progressing)     PLAN   Plan of care Certification: 12/15/2022 to 3/15/2023.     Improve B LE strength and balance    Krista Vigil, PT

## 2023-03-14 ENCOUNTER — CLINICAL SUPPORT (OUTPATIENT)
Dept: REHABILITATION | Facility: OTHER | Age: 88
End: 2023-03-14
Payer: MEDICARE

## 2023-03-14 DIAGNOSIS — R53.1 DECREASED RANGE OF MOTION WITH DECREASED STRENGTH: ICD-10-CM

## 2023-03-14 DIAGNOSIS — Z74.09 IMPAIRED FUNCTIONAL MOBILITY, BALANCE, GAIT, AND ENDURANCE: Primary | ICD-10-CM

## 2023-03-14 DIAGNOSIS — G89.29 CHRONIC LEFT-SIDED LOW BACK PAIN WITHOUT SCIATICA: ICD-10-CM

## 2023-03-14 DIAGNOSIS — M25.60 DECREASED RANGE OF MOTION WITH DECREASED STRENGTH: ICD-10-CM

## 2023-03-14 DIAGNOSIS — M54.50 CHRONIC LEFT-SIDED LOW BACK PAIN WITHOUT SCIATICA: ICD-10-CM

## 2023-03-14 PROCEDURE — 97530 THERAPEUTIC ACTIVITIES: CPT | Mod: PN | Performed by: PHYSICAL THERAPIST

## 2023-03-14 PROCEDURE — 97110 THERAPEUTIC EXERCISES: CPT | Mod: PN | Performed by: PHYSICAL THERAPIST

## 2023-03-16 ENCOUNTER — CLINICAL SUPPORT (OUTPATIENT)
Dept: REHABILITATION | Facility: OTHER | Age: 88
End: 2023-03-16
Payer: MEDICARE

## 2023-03-16 DIAGNOSIS — M25.60 DECREASED RANGE OF MOTION WITH DECREASED STRENGTH: ICD-10-CM

## 2023-03-16 DIAGNOSIS — R53.1 DECREASED RANGE OF MOTION WITH DECREASED STRENGTH: ICD-10-CM

## 2023-03-16 DIAGNOSIS — M54.50 CHRONIC LEFT-SIDED LOW BACK PAIN WITHOUT SCIATICA: ICD-10-CM

## 2023-03-16 DIAGNOSIS — Z74.09 IMPAIRED FUNCTIONAL MOBILITY, BALANCE, GAIT, AND ENDURANCE: Primary | ICD-10-CM

## 2023-03-16 DIAGNOSIS — G89.29 CHRONIC LEFT-SIDED LOW BACK PAIN WITHOUT SCIATICA: ICD-10-CM

## 2023-03-16 PROCEDURE — 97530 THERAPEUTIC ACTIVITIES: CPT | Mod: PN

## 2023-03-16 PROCEDURE — 97110 THERAPEUTIC EXERCISES: CPT | Mod: PN

## 2023-03-16 NOTE — PROGRESS NOTES
"[facility]   Physical Therapy Treatment Note      Name: Sonja Weeks  Clinic Number: [id number]     Therapy Diagnosis:   Encounter Diagnoses   Name Primary?    Impaired functional mobility, balance, gait, and endurance Yes    Decreased range of motion with decreased strength     Chronic left-sided low back pain without sciatica          Physician: Brennan Vazquez MD     Visit Date: 3/16/2023      Physician Orders: PT Eval and Treat   Medical Diagnosis from Referral: M15.9 (ICD-10-CM) - Primary osteoarthritis involving multiple joints   Evaluation Date: 12/15/2022  Authorization Period Expiration: 12/31/2022   Plan of Care Expiration: 3/15/2023  Progress Note Due: 3/15/2023  Visit # / Visits authorized: 23/ 23   FOTO: 3/3 on 3/16/2023  PTA Visit #: 0/ 5     Precautions: Standard, cancer, and HTN, scoliosis, osteoporosis, R TKA, left knee arthroscopy, left reverse shoulder replacement      Time In: 1120  Time Out: 1205  Total Billable Time: 45 minutes - discharge today     SUBJECTIVE   Pt reports: continued intermittent back and hip pain. But despite pain she is able to getup from a chair without difficulty, walk without her walker "and sometimes without my cane in my apartment," and standing for longer periods of time without worsening pain. She has resumed exercises classes at [facility], 3x/weekly. Pt says she is ready for discharge today, but will come back to PT if she needs it later in the year.    Response to previous treatment: "reports she feels good"  Functional change: None today. Continued pain and difficulty with L low back and side, difficulty achieving upright posture     Pain: 6/10  Location: L side of mid-low back and hip      OBJECTIVE      Updated on 3/16/2023  .    Postural examination/scapula alignment: Rounded shoulder, Head forward, Posterior pelvic tilt, Abnormal trunk flexion, Slouched posture, Increased kyphosis, Decreased lordosis, and elevated iliac " "crest (R) -- UA to self correct without VC    Joint integrity: severe hypo throughout TSP/LSP, with mm guarding in mid-lower LSP with tight mm endfeel  LE ROM: WFL throughout     Flexibility:      Right               Left  SEE            Mod hypo        Mod hypo  FADDIR           Min hypo         Mod hypo  Ely's                 Mod felipe           Mod felipe       Lower Extremity Strength updated 3/7/23  Right LE   Left LE     Hip flexion: 4/5 Hip flexion: 4/5   Hip extension:  4/5 Hip extension: 4/5   Hip abduction: 4+/5 Hip abduction: 4+/5   Hip adduction:  5/5 Hip adduction:  5/5   Knee Flexion 5/5 Knee Flexion 5/5   Knee Extension 4+/5 Knee Extension 4+/5         Updated 3/7/23    Evaluation   Timed Up and Go  <20", indep   Single Limb Stance R LE UA without LOB   Single Limb Stance L LE UA without LOB   Self Selected Walking Speed slow   Fast Walking Speed Slight change   30 second Chair Rise 4x (previous 5x)   Tandem stand  UA  <10" in staggered stance         Limitation/Restriction for FOTO NOC-neurodevelopmental disorder Survey     Therapist reviewed FOTO scores for Sonja Weeks on 3/16/2022.   FOTO documents entered into [employer] - see Media section.     Limitation Score: 36%            TREATMENT      Sonja received the bolded treatments listed below:       Patient received therapeutic exercises for 25 minutes for improved strength and AROM including:    LTR x 20 x pink cook band  Bridges + glute set GTB x 3 min   STRAIGHT LEG RAISE with POSTERIOR PELVIC TILT 2 x 10   Clamshells GTB x 3 min ea side     Patient received manual therapeutic technique for 00 minutes for improved soft tissue and joint mobility including:  Gentle SOFT TISSUE MOBILIZATION to left lateral hip musculature focusing on gluteus medius, piriformis, gluteus andrew, left lumbar erector spinae and quadratus lumborum      Patient received neuromuscular reeducation for 00 minutes for improved proprioception and balance including:  +Forward " "walking in \\ bars (without AD)  +Lateral walking in \\ bars (without AD)  +NBOS on foam 3 x 30"        Patient received therapeutic activities for 20 minutes for improved tolerance to functional activities including:  sit to stands: 2 x 10 AROM   freemotion rows 2 x 15 x 10# - dbl handle  freemotion lat pulls 2 x 15 x 17# - lat bar with wide          PATIENT EDUCATION AND HOME EXERCISES      Home Exercises Provided and Patient Education Provided       Education provided:   PT educated pt on importance of compliance with their HEP this visit.   1/17/23 - updated HEP, GTB given     Written Home Exercises Provided: Patient instructed to cont prior HEP. Exercises were reviewed and Sonja was able to demonstrate them prior to the end of the session.  Sonja demonstrated fair  understanding of the education provided. See EMR under Patient Instructions for exercises provided during therapy sessions     ASSESSMENT     Pt presents with functional mobility, strength, and marked improvement with independence in sit<>stand transfers, ambulation, and curb climbing since initial visit despite continued c/o stiffness/pain in low back. Encouraged pt to resume 30 min, 3x weekly, provided by trainers at [facility]. Pt verbalized understanding. Pt is independent with HEP and demonstrates good return technique. Pt has progressed fairly well and has met 6 of 10 therapeutic goals. Pt no longer requires skilled PT. Recommend D/C from skilled care.       Goals:   Short Term Goals (6 Weeks):  1. Pt able to stand >=30 minutes with ADLs with mod difficulty. (Met 3/16/2023)  2. Pt able to walk >=30 minutes with household chores with mod difficulty. (Met 3/16/2023)  3.  Pt able to transfer in/out of chairs 8x in 30 seconds as demonstrating improving functional strength. (NOT Met 3/16/2023)  4. Pt able to ascend/descend curb, independently, 1 handrail, with mod difficulty. (Met 3/16/2023)  5. Pt to demonstrate " improved functional ability with FOTO limitation <=39% disability. (NOT Met 3/16/2023)     Long Term Goals (12 Weeks):  1. Pt able to stand >=30 minutes with ADLs with min difficulty. (Met 3/16/2023)  2. Pt able to walk >=30 minutes with household chores with min difficulty. (Met 3/16/2023)  3.  Pt able to transfer in/out of chairs 12x in 30 seconds as demonstrating improving functional strength. (NOT Met 3/16/2023)  4. Pt able to ascend/descend 1 flight of stairs, independently, 1 handrail, with min difficulty. (Met 3/16/2023)  5. Pt to demonstrate improved functional ability with FOTO limitation <=30% disability. (NOT met, 3/16/2023)     PLAN     D/c with HEP and green theraband today, with instructions to continue with exercises at assisted living facility, and to F/u with MD if sx worsen or do not resolve.    Jing Menchaca, PT

## 2023-07-17 ENCOUNTER — TELEPHONE (OUTPATIENT)
Dept: DERMATOLOGY | Facility: CLINIC | Age: 88
End: 2023-07-17
Payer: MEDICARE

## 2023-07-17 NOTE — TELEPHONE ENCOUNTER
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 Called to scheduled mohs. L message on daughter's voicemail to call us back.

## 2023-07-18 ENCOUNTER — TELEPHONE (OUTPATIENT)
Dept: DERMATOLOGY | Facility: CLINIC | Age: 88
End: 2023-07-18
Payer: MEDICARE

## 2023-07-18 NOTE — TELEPHONE ENCOUNTER
Pt (daughter) informed of biopsy results proven SCC left forehead. Scheduled for same day Mohs on 9/6 at 1pm. Over the phone consult completed. Pt confirmed time, date, location. New pt packet sent in the mail.

## 2023-09-06 ENCOUNTER — PROCEDURE VISIT (OUTPATIENT)
Dept: DERMATOLOGY | Facility: CLINIC | Age: 88
End: 2023-09-06
Payer: MEDICARE

## 2023-09-06 VITALS
HEART RATE: 54 BPM | HEIGHT: 62 IN | SYSTOLIC BLOOD PRESSURE: 149 MMHG | DIASTOLIC BLOOD PRESSURE: 68 MMHG | WEIGHT: 147.25 LBS | BODY MASS INDEX: 27.1 KG/M2

## 2023-09-06 DIAGNOSIS — C44.329 SQUAMOUS CELL CARCINOMA OF FOREHEAD: Primary | ICD-10-CM

## 2023-09-06 PROCEDURE — 17311 MOHS 1 STAGE H/N/HF/G: CPT | Mod: PBBFAC | Performed by: DERMATOLOGY

## 2023-09-06 PROCEDURE — 99499 UNLISTED E&M SERVICE: CPT | Mod: S$PBB,,, | Performed by: DERMATOLOGY

## 2023-09-06 PROCEDURE — 13131 CMPLX RPR F/C/C/M/N/AX/G/H/F: CPT | Mod: S$PBB,51,, | Performed by: DERMATOLOGY

## 2023-09-06 PROCEDURE — 99499 NO LOS: ICD-10-PCS | Mod: S$PBB,,, | Performed by: DERMATOLOGY

## 2023-09-06 PROCEDURE — 13131 PR RECMPL WND HEAD,FAC,HAND 1.1-2.5 CM: ICD-10-PCS | Mod: S$PBB,51,, | Performed by: DERMATOLOGY

## 2023-09-06 PROCEDURE — 17311 MOHS 1 STAGE H/N/HF/G: CPT | Mod: S$PBB,,, | Performed by: DERMATOLOGY

## 2023-09-06 PROCEDURE — 13131 CMPLX RPR F/C/C/M/N/AX/G/H/F: CPT | Mod: PBBFAC,51 | Performed by: DERMATOLOGY

## 2023-09-06 PROCEDURE — 17311: ICD-10-PCS | Mod: S$PBB,,, | Performed by: DERMATOLOGY

## 2023-09-06 NOTE — PROGRESS NOTES
New patient with a 6-12 months h/o growth on the L forehead. Patient states the lesion was dark at one point. Denies bleeding, crusting, and scabbing. Biopsy c/w SCC. No prior treatment.    Physical Exam   HENT:   Head:           L superolateral forehead at hairline with a 4 x 4 mm pink bx site located 4 cm anteriorly from the left superior ear attachment and 6 cm superiorly.    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  Diagnosis, photograph, and pathology report were reviewed with patient.  Discussed risks, benefits, and alternatives of Mohs surgery.  Discussed repair options including complex closure, skin flap, skin graft, and second intention healing.  Patient agreed to proceed with Mohs surgery today.      PROCEDURE: Mohs' Micrographic Surgery    INDICATION: Location in non-mask areas of face where maximum conservation of tumor-free tissue is needed. Biopsy-proven skin cancer of cosmetically and functionally important areas, including head, neck, genital, hand, foot, or areas known for having difficulty in healing, such as the lower anterior legs. Tumor with ill-defined borders.    REFERRING PROVIDER: Jessie Price MD    CASE NUMBER:     ANESTHETIC: 2 cc 0.5% Bupivacaine with Epi 1:200,000 mixed 1:1 with plain 1% Lidocaine    SURGICAL PREP: Hibiclens    SURGEON: Tangela De La Cruz MD    ASSISTANTS: Sena Alex PA-C    PREOPERATIVE DIAGNOSIS: squamous cell carcinoma- well differentiated    POSTOPERATIVE DIAGNOSIS: squamous cell carcinoma- superficial    PATHOLOGIC DIAGNOSIS: squamous cell carcinoma- well differentiated, superficial    HISTOLOGY OF SPECIMENS IN FIRST STAGE:   Debulking tumor confirms superficial squamous cell carcinoma.  Tumor Type: No tumor seen. Actinic keratosis: Keratinocyte atypia along the basal layer.    STAGES OF MOHS' SURGERY PERFORMED: 1    TUMOR-FREE PLANE ACHIEVED: Yes    HEMOSTASIS: electrocoagulation     SPECIMENS: 2     LOCATION:  left forehead (superolateral at hairline). Location verified with Dr. Price's clinical photograph. Patient also verified location with hand held mirror.    INITIAL LESION SIZE: 0.3 x 0.3 cm    FINAL DEFECT SIZE: 0.7 x 0.8 cm    WOUND REPAIR/DISPOSITION: The patient tolerated Mohs' Micrographic Surgery for a squamous cell carcinoma very well. When the tumor was completely removed, a repair of the surgical defect was undertaken.    PROCEDURE: Complex Linear Repair    INDICATION: Status post Mohs' Micrographic Surgery for squamous cell carcinoma.    CASE NUMBER:     SURGEON: Tangela De La Cruz MD    ASSISTANTS: Sena Alex PA-C and Marissa Velazquez MA    ANESTHETIC: 1 cc 0.5% Bupivacaine with Epi 1:200,000 mixed 1:1 with plain 1% Lidocaine    SURGICAL PREP: Hibiclens, prepped by Marissa Velazquez MA    LOCATION: left forehead (superolateral at hairline)    DEFECT SIZE: 0.7 x 0.8 cm    WOUND REPAIR/DISPOSITION:  After the patient's carcinoma had been completely removed with Mohs' Micrographic Surgery, a repair of the surgical defect was undertaken. The patient was returned to the operating suite where the area of left superolateral forehead at hairline was prepped, draped, and anesthetized in the usual sterile fashion. The wound was widely undermined in all directions. The wound was undermined to a distance at least the maximum width of the defect as measured perpendicular to the closure line along at least one entire edge of the defect, in this case 1 cm. Then, electrocoagulation was used to obtain meticulous hemostasis. 4-0 Vicryl buried vertical mattress sutures were placed into the subcutaneous and dermal plane to close the wound and kelsey the cutaneous wound edge. Bilateral dog ears were identified and were removed by a standard Burow's triangle technique. The cutaneous wound edges were closed using interrupted 5-0 Prolene suture.    The patient tolerated the procedure well.    The area was cleaned and dressed  "appropriately and the patient was given wound care instructions, as well as appointment for follow-up evaluation and suture removal in 7 days.    LENGTH OF REPAIR: 2 cm    Vitals:    09/06/23 1222 09/06/23 1448   BP: (!) 144/73 (!) 149/68   BP Location: Right arm    Patient Position: Sitting    BP Method: Medium (Automatic)    Pulse: 62 (!) 54   Weight: 66.8 kg (147 lb 4.3 oz)    Height: 5' 2" (1.575 m)          "

## 2023-09-07 ENCOUNTER — INFUSION (OUTPATIENT)
Dept: INFUSION THERAPY | Facility: OTHER | Age: 88
End: 2023-09-07
Payer: MEDICARE

## 2023-09-07 VITALS
SYSTOLIC BLOOD PRESSURE: 166 MMHG | RESPIRATION RATE: 16 BRPM | OXYGEN SATURATION: 96 % | HEART RATE: 56 BPM | DIASTOLIC BLOOD PRESSURE: 71 MMHG

## 2023-09-07 DIAGNOSIS — M81.0 AGE-RELATED OSTEOPOROSIS WITHOUT CURRENT PATHOLOGICAL FRACTURE: Primary | ICD-10-CM

## 2023-09-07 PROCEDURE — 63600175 PHARM REV CODE 636 W HCPCS: Mod: JZ,JG | Performed by: INTERNAL MEDICINE

## 2023-09-07 PROCEDURE — 96372 THER/PROPH/DIAG INJ SC/IM: CPT

## 2023-09-07 RX ADMIN — DENOSUMAB 60 MG: 60 INJECTION SUBCUTANEOUS at 02:09

## 2023-09-14 ENCOUNTER — OFFICE VISIT (OUTPATIENT)
Dept: DERMATOLOGY | Facility: CLINIC | Age: 88
End: 2023-09-14
Payer: MEDICARE

## 2023-09-14 DIAGNOSIS — Z09 POSTOP CHECK: Primary | ICD-10-CM

## 2023-09-14 PROCEDURE — 99212 OFFICE O/P EST SF 10 MIN: CPT | Mod: PBBFAC | Performed by: DERMATOLOGY

## 2023-09-14 PROCEDURE — 99999 PR PBB SHADOW E&M-EST. PATIENT-LVL II: CPT | Mod: PBBFAC,,, | Performed by: DERMATOLOGY

## 2023-09-14 PROCEDURE — 99024 POSTOP FOLLOW-UP VISIT: CPT | Mod: POP,,, | Performed by: DERMATOLOGY

## 2023-09-14 PROCEDURE — 99999 PR PBB SHADOW E&M-EST. PATIENT-LVL II: ICD-10-PCS | Mod: PBBFAC,,, | Performed by: DERMATOLOGY

## 2023-09-14 PROCEDURE — 99024 PR POST-OP FOLLOW-UP VISIT: ICD-10-PCS | Mod: POP,,, | Performed by: DERMATOLOGY

## 2023-10-03 ENCOUNTER — OFFICE VISIT (OUTPATIENT)
Dept: URGENT CARE | Facility: CLINIC | Age: 88
End: 2023-10-03
Payer: MEDICARE

## 2023-10-03 VITALS
DIASTOLIC BLOOD PRESSURE: 63 MMHG | WEIGHT: 147 LBS | TEMPERATURE: 98 F | RESPIRATION RATE: 17 BRPM | SYSTOLIC BLOOD PRESSURE: 135 MMHG | BODY MASS INDEX: 27.05 KG/M2 | OXYGEN SATURATION: 95 % | HEART RATE: 59 BPM | HEIGHT: 62 IN

## 2023-10-03 DIAGNOSIS — M54.2 CERVICALGIA: ICD-10-CM

## 2023-10-03 DIAGNOSIS — M43.6 TORTICOLLIS: Primary | ICD-10-CM

## 2023-10-03 DIAGNOSIS — M54.2 NECK PAIN: ICD-10-CM

## 2023-10-03 PROCEDURE — 96372 THER/PROPH/DIAG INJ SC/IM: CPT | Mod: S$GLB,,, | Performed by: FAMILY MEDICINE

## 2023-10-03 PROCEDURE — 99213 OFFICE O/P EST LOW 20 MIN: CPT | Mod: 25,S$GLB,, | Performed by: NURSE PRACTITIONER

## 2023-10-03 PROCEDURE — 72040 XR CERVICAL SPINE 2 OR 3 VIEWS: ICD-10-PCS | Mod: S$GLB,,, | Performed by: RADIOLOGY

## 2023-10-03 PROCEDURE — 99213 PR OFFICE/OUTPT VISIT, EST, LEVL III, 20-29 MIN: ICD-10-PCS | Mod: 25,S$GLB,, | Performed by: NURSE PRACTITIONER

## 2023-10-03 PROCEDURE — 96372 PR INJECTION,THERAP/PROPH/DIAG2ST, IM OR SUBCUT: ICD-10-PCS | Mod: S$GLB,,, | Performed by: FAMILY MEDICINE

## 2023-10-03 PROCEDURE — 72040 X-RAY EXAM NECK SPINE 2-3 VW: CPT | Mod: S$GLB,,, | Performed by: RADIOLOGY

## 2023-10-03 RX ORDER — DICLOFENAC SODIUM 10 MG/G
2 GEL TOPICAL DAILY
Qty: 20 G | Refills: 0 | Status: SHIPPED | OUTPATIENT
Start: 2023-10-03 | End: 2023-12-29

## 2023-10-03 RX ORDER — TIZANIDINE 2 MG/1
2 TABLET ORAL EVERY 12 HOURS PRN
Qty: 20 TABLET | Refills: 0 | Status: SHIPPED | OUTPATIENT
Start: 2023-10-03 | End: 2023-10-13

## 2023-10-03 RX ORDER — KETOROLAC TROMETHAMINE 30 MG/ML
30 INJECTION, SOLUTION INTRAMUSCULAR; INTRAVENOUS
Status: COMPLETED | OUTPATIENT
Start: 2023-10-03 | End: 2023-10-03

## 2023-10-03 RX ORDER — NYSTATIN 100000 U/G
CREAM TOPICAL
COMMUNITY
Start: 2023-08-28 | End: 2023-12-29

## 2023-10-03 RX ADMIN — KETOROLAC TROMETHAMINE 30 MG: 30 INJECTION, SOLUTION INTRAMUSCULAR; INTRAVENOUS at 05:10
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
## 2023-10-03 NOTE — PATIENT INSTRUCTIONS
Recommend contoured pillow, warm compresses, stretching massage.  Toradol in clinic  Muscle relaxer  Follow-up if symptoms worsen or do not improve.  Upper body strengthening exercise

## 2023-10-15 ENCOUNTER — OFFICE VISIT (OUTPATIENT)
Dept: URGENT CARE | Facility: CLINIC | Age: 88
End: 2023-10-15
Payer: MEDICARE

## 2023-10-15 VITALS
HEART RATE: 75 BPM | SYSTOLIC BLOOD PRESSURE: 129 MMHG | HEIGHT: 62 IN | TEMPERATURE: 99 F | OXYGEN SATURATION: 94 % | DIASTOLIC BLOOD PRESSURE: 74 MMHG | WEIGHT: 147.06 LBS | RESPIRATION RATE: 19 BRPM | BODY MASS INDEX: 27.06 KG/M2

## 2023-10-15 DIAGNOSIS — U07.1 COVID-19 VIRUS DETECTED: ICD-10-CM

## 2023-10-15 DIAGNOSIS — U07.1 COVID-19: ICD-10-CM

## 2023-10-15 DIAGNOSIS — J02.9 SORE THROAT: Primary | ICD-10-CM

## 2023-10-15 LAB
CTP QC/QA: YES
CTP QC/QA: YES
MOLECULAR STREP A: NEGATIVE
SARS-COV-2 AG RESP QL IA.RAPID: POSITIVE

## 2023-10-15 PROCEDURE — 99214 PR OFFICE/OUTPT VISIT, EST, LEVL IV, 30-39 MIN: ICD-10-PCS | Mod: S$GLB,,,

## 2023-10-15 PROCEDURE — 71046 X-RAY EXAM CHEST 2 VIEWS: CPT | Mod: S$GLB,,, | Performed by: RADIOLOGY

## 2023-10-15 PROCEDURE — 87811 SARS CORONAVIRUS 2 ANTIGEN POCT, MANUAL READ: ICD-10-PCS | Mod: QW,S$GLB,,

## 2023-10-15 PROCEDURE — 99214 OFFICE O/P EST MOD 30 MIN: CPT | Mod: S$GLB,,,

## 2023-10-15 PROCEDURE — 87651 POCT STREP A MOLECULAR: ICD-10-PCS | Mod: QW,S$GLB,,

## 2023-10-15 PROCEDURE — 87811 SARS-COV-2 COVID19 W/OPTIC: CPT | Mod: QW,S$GLB,,

## 2023-10-15 PROCEDURE — 87651 STREP A DNA AMP PROBE: CPT | Mod: QW,S$GLB,,

## 2023-10-15 PROCEDURE — 71046 XR CHEST PA AND LATERAL: ICD-10-PCS | Mod: S$GLB,,, | Performed by: RADIOLOGY

## 2023-10-15 RX ORDER — NIRMATRELVIR AND RITONAVIR 300-100 MG
KIT ORAL
Qty: 15 TABLET | Refills: 0 | Status: SHIPPED | OUTPATIENT
Start: 2023-10-15 | End: 2023-12-29

## 2023-10-15 NOTE — PATIENT INSTRUCTIONS
Please drink plenty of fluids.  Please get plenty of rest.  Please return here or go to the Emergency Department for any concerns or worsening of condition.  If you were given a steroid shot in the clinic and have also been given a prescription for a steroid such as Prednisone or a Medrol Dose Pack, please begin taking them tomorrow.  If you do not have Hypertension or any history of palpitations, it is ok to take over the counter Sudafed or Mucinex D or Allegra-D or Claritin-D or Zyrtec-D.  If you do take one of the above, it is ok to combine that with plain over the counter Mucinex or Allegra or Claritin or Zyrtec.  If for example you are taking Zyrtec -D, you can combine that with Mucinex, but not Mucinex-D.  If you are taking Mucinex-D, you can combine that with plain Allegra or Claritin or Zyrtec.   If you do have Hypertension or palpitations, it is safe to take Coricidin HBP for relief of sinus symptoms.  Take OTC Acetaminophen for throat pain. Continue OTC Robatussin as needed for cough.   Take Paxlovid as instructed. Stop rosuvastatin for 10 days, encouraged patient to call PCP and get confirmation before starting Paxlovid.  If not allergic, please take over the counter Tylenol (Acetaminophen) and/or Motrin (Ibuprofen) as directed for control of pain and/or fever.  Please follow up with your primary care doctor or specialist as needed.    If you  smoke, please stop smoking.

## 2023-10-15 NOTE — PROGRESS NOTES
"Subjective:      Patient ID: Sonja Weeks is a 89 y.o. female.    Vitals:  height is 5' 2" (1.575 m) and weight is 66.7 kg (147 lb 0.8 oz). Her oral temperature is 98.9 °F (37.2 °C). Her blood pressure is 129/74 and her pulse is 75. Her respiration is 19 and oxygen saturation is 94% (abnormal).     Chief Complaint: Cough    89 y/o female presents to clinic for fatigue, sore throat, headache, nausea, and dry cough. Patient's son and daughter were present during visit. Symptoms started 2 days ago. Denies any recent ill exposures. Pt notes that when she blows her nose, the rhinorrhea is yellow. She notes her cough and sore throat have progressively worsened. Patient has taken Robitussin and cough drops with minimal relief. Denies any recent fever, chills, chest pain, sob, wheezing, body aches, vomiting, diarrhea, ear pain, sinus pressure, rashes.           Sore Throat   This is a new problem. Episode onset: 2 days ago. The problem has been gradually improving. Neither side of throat is experiencing more pain than the other. There has been no fever. The pain is at a severity of 6/10. The pain is mild. Associated symptoms include coughing, headaches, neck pain and trouble swallowing. Pertinent negatives include no abdominal pain, congestion, diarrhea, ear discharge, ear pain, shortness of breath or vomiting. Associated symptoms comments: Trouble swallowing pills  . Treatments tried: robatussin, cough drops. The treatment provided no relief.       Constitution: Positive for activity change, appetite change and fatigue. Negative for chills, sweating, fever and generalized weakness.   HENT:  Positive for sore throat and trouble swallowing. Negative for ear pain, ear discharge, congestion, postnasal drip, sinus pain and sinus pressure.    Neck: Positive for neck pain. Negative for neck stiffness and neck swelling.   Cardiovascular:  Negative for chest pain and sob on exertion.   Eyes:  Negative for eye pain. "   Respiratory:  Positive for cough. Negative for sputum production, shortness of breath and wheezing.    Gastrointestinal:  Negative for abdominal pain, nausea, vomiting and diarrhea.   Genitourinary:  Negative for dysuria.   Musculoskeletal:  Negative for pain.   Skin:  Negative for rash.   Allergic/Immunologic: Negative for environmental allergies, seasonal allergies and sneezing.   Neurological:  Positive for headaches. Negative for dizziness and light-headedness.      Objective:     Physical Exam   Constitutional: She is oriented to person, place, and time. She appears well-developed. She is cooperative.  Non-toxic appearance. She does not appear ill. No distress.   HENT:   Head: Normocephalic and atraumatic.   Ears:   Right Ear: Hearing, tympanic membrane, external ear and ear canal normal. No no drainage, swelling or tenderness. Tympanic membrane is not erythematous, not retracted and not bulging.   Left Ear: Hearing, tympanic membrane, external ear and ear canal normal. No no drainage, swelling or tenderness. Tympanic membrane is not erythematous, not retracted and not bulging.   Nose: Rhinorrhea present. No mucosal edema or nasal deformity. No epistaxis. Right sinus exhibits no maxillary sinus tenderness and no frontal sinus tenderness. Left sinus exhibits no maxillary sinus tenderness and no frontal sinus tenderness.   Mouth/Throat: Uvula is midline and mucous membranes are normal. No trismus in the jaw. Normal dentition. No uvula swelling. Posterior oropharyngeal erythema and cobblestoning present. No oropharyngeal exudate or posterior oropharyngeal edema. Tonsils are 1+ on the right. Tonsils are 1+ on the left. No tonsillar exudate.   Eyes: Conjunctivae and lids are normal. No scleral icterus.   Neck: Trachea normal and phonation normal. Neck supple. No edema present. No erythema present. No neck rigidity present.   Cardiovascular: Normal rate, regular rhythm, normal heart sounds and normal pulses.    Pulmonary/Chest: Effort normal and breath sounds normal. No respiratory distress. She has no decreased breath sounds. She has no wheezes. She has no rhonchi. She has no rales.   Abdominal: Normal appearance.   Musculoskeletal: Normal range of motion.         General: No deformity. Normal range of motion.   Lymphadenopathy:     She has no cervical adenopathy.   Neurological: She is alert and oriented to person, place, and time. She exhibits normal muscle tone. Coordination normal.   Skin: Skin is warm, dry, intact, not diaphoretic and not pale.   Psychiatric: Her speech is normal and behavior is normal. Judgment and thought content normal.   Nursing note and vitals reviewed.    XR CHEST PA AND LATERAL    Result Date: 10/15/2023  EXAMINATION: XR CHEST PA AND LATERAL CLINICAL HISTORY: COVID-19 TECHNIQUE: PA and lateral views of the chest were performed. 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  The hardware is intact. The trachea is unremarkable.  There are calcifications of the aortic knob.  The cardiomediastinal silhouette is within normal limits.  There is no evidence of free air beneath the hemidiaphragms.  There are no pleural effusions.  There is no evidence of a pneumothorax.  There is no evidence of pneumomediastinum.  No airspace opacity is present. There dextroconvex scoliosis of the thoracolumbar alignment.  No evidence of a fracture.     No airspace disease. Postop changes in the left shoulder. Thoracolumbar scoliosis. Electronically signed by: Michele Perez MD Date:    10/15/2023 Time:    17:00        Assessment:     1. Sore throat    2. COVID-19        Plan:       Results for orders placed or performed in visit on 10/15/23   SARS Coronavirus 2 Antigen, POCT Manual Read   Result Value Ref Range    SARS Coronavirus 2 Antigen Positive (A) Negative     Acceptable Yes    POCT Strep A, Molecular   Result Value Ref Range    Molecular Strep  A, POC Negative Negative     Acceptable Yes          Sore throat  -     SARS Coronavirus 2 Antigen, POCT Manual Read  -     POCT Strep A, Molecular    COVID-19  -     XR CHEST PA AND LATERAL; Future; Expected date: 10/15/2023  -     nirmatrelvir-ritonavir (PAXLOVID) 300 mg (150 mg x 2)-100 mg copackaged tablets (EUA); Take 3 tablets by mouth 2 (two) times daily. Each dose contains 2 nirmatrelvir (pink tablets) and 1 ritonavir (white tablet). Take all 3 tablets together.  Dispense: 15 tablet; Refill: 0      4= Covid risk score. Patient instructed to stop rosuvastatin for 10 days, encouraged patient to call PCP and get confirmation before starting Paxlovid. Continue OTC Robitussin as needed for cough releief. Patient can take OTC Acetaminophen or Motrin as needed for fever/throat pain relief. Discussed specific CDC precautions regarding quarantine and symptoms with patient and her children that were present during visit.       Medical Decision Making:   Initial Assessment:   89 y/o female presents with sore throat, productive cough, headache, lack of appetite, neck pain, and fatigue. Denies any recent exposures. On physical exam, patients initial O2 sat was 92%. Pt states she does not use at home oxygen. Patient normally sits around 95%. By the end of exam, patient's O2 sat was at 94%. Patient was not in distress or did not have any shortness or breath or wheezing. Pertinent exam positives were clear rhinorrhea and posterior oropharyngeal erythema and cobblestoning. No enlargement of tonsils or exudate were noted. No wheezing, rhonchi, or rales were noted on lung exam. Patient's pulse, BP, and Temperature were within normal limits. Patient was swabbed for covid, strep, and a chest x-ray was performed to rule out pneumonia.   Differential Diagnosis:   URI, pneumonia,  pneumothorax, pleural effusion, bronchitis,  covid-19, rhinovirus, asthma, bronchiolitis, lung cancer, tuberculosis, pulmonary nodules,  influenza, asthma, COPD, cystic fibrosis, bronchiectasis, Pulmonary embolism, pneumoconiosis     Urgent Care Management:  Patient's Covid results were positive. She had a negative strep result and her chest x-ray showed no signs of pneumonia, pleural effusion, bronchitis. Discussed with patient and her children that were present, that her Covid risk score was 4 and she qualified for anti-viral treatment. Patient agreed to start on Paxlovid. Medications were reviewed and instructed patient to stop Rosuvastatin for 10 days while on antiviral medication. Recommended for patient to discuss this with PCP.  Patient agreed to take OTC Robitussin for cough relief, along with cough drops. Instructed patient to take OTC Acetaminophen or Ibuprofen as needed for throat pain. She could also use throat numbing spray to assist with throat pain relief. Recommended for patient to consume softer foods such as soups, broth, or hot tea with honey and to avoid more thick textured foods until her throat pain resolves. Continue to drink plenty of fluids and get rest. Discussed CDC's specific guidelines regarding quarantine and specific precautions to take regarding Covid.  Encouraged patient's daughter to invest in a pulse ox to monitor patient's oxygen saturation. Patient is togo to the ER if patient has difficulty breathing, shortness of breath, wheezing or worsening symptoms.        Patient Instructions   Please drink plenty of fluids.  Please get plenty of rest.  Please return here or go to the Emergency Department for any concerns or worsening of condition.  If you were given a steroid shot in the clinic and have also been given a prescription for a steroid such as Prednisone or a Medrol Dose Pack, please begin taking them tomorrow.  If you do not have Hypertension or any history of palpitations, it is ok to take over the counter Sudafed or Mucinex D or Allegra-D or Claritin-D or Zyrtec-D.  If you do take one of the above, it is ok  to combine that with plain over the counter Mucinex or Allegra or Claritin or Zyrtec.  If for example you are taking Zyrtec -D, you can combine that with Mucinex, but not Mucinex-D.  If you are taking Mucinex-D, you can combine that with plain Allegra or Claritin or Zyrtec.   If you do have Hypertension or palpitations, it is safe to take Coricidin HBP for relief of sinus symptoms.  Take OTC Acetaminophen for throat pain. Continue OTC Robatussin as needed for cough.   Take Paxlovid as instructed. Stop rosuvastatin for 10 days, encouraged patient to call PCP and get confirmation before starting Paxlovid.  If not allergic, please take over the counter Tylenol (Acetaminophen) and/or Motrin (Ibuprofen) as directed for control of pain and/or fever.  Please follow up with your primary care doctor or specialist as needed.    If you  smoke, please stop smoking.

## 2023-12-29 ENCOUNTER — OFFICE VISIT (OUTPATIENT)
Dept: INTERNAL MEDICINE | Facility: CLINIC | Age: 88
End: 2023-12-29
Payer: MEDICARE

## 2023-12-29 ENCOUNTER — CLINICAL SUPPORT (OUTPATIENT)
Dept: INTERNAL MEDICINE | Facility: CLINIC | Age: 88
End: 2023-12-29
Payer: MEDICARE

## 2023-12-29 VITALS
HEIGHT: 72 IN | DIASTOLIC BLOOD PRESSURE: 60 MMHG | BODY MASS INDEX: 19.53 KG/M2 | OXYGEN SATURATION: 95 % | WEIGHT: 144.19 LBS | HEART RATE: 62 BPM | SYSTOLIC BLOOD PRESSURE: 130 MMHG

## 2023-12-29 DIAGNOSIS — H53.9 VISUAL CHANGES: ICD-10-CM

## 2023-12-29 DIAGNOSIS — I35.8 AORTIC HEART MURMUR: ICD-10-CM

## 2023-12-29 DIAGNOSIS — I10 BENIGN ESSENTIAL HTN: ICD-10-CM

## 2023-12-29 DIAGNOSIS — E06.3 HYPOTHYROIDISM DUE TO HASHIMOTO'S THYROIDITIS: ICD-10-CM

## 2023-12-29 DIAGNOSIS — R79.9 ABNORMAL FINDING OF BLOOD CHEMISTRY, UNSPECIFIED: ICD-10-CM

## 2023-12-29 DIAGNOSIS — E55.9 VITAMIN D DEFICIENCY, UNSPECIFIED: ICD-10-CM

## 2023-12-29 DIAGNOSIS — E78.01 HYPERLIPIDEMIA TYPE II: ICD-10-CM

## 2023-12-29 DIAGNOSIS — M54.2 CERVICALGIA OF OCCIPITO-ATLANTO-AXIAL REGION: Primary | ICD-10-CM

## 2023-12-29 DIAGNOSIS — E03.8 HYPOTHYROIDISM DUE TO HASHIMOTO'S THYROIDITIS: ICD-10-CM

## 2023-12-29 DIAGNOSIS — R53.81 OTHER MALAISE: ICD-10-CM

## 2023-12-29 DIAGNOSIS — M81.0 OSTEOPOROSIS WITHOUT CURRENT PATHOLOGICAL FRACTURE, UNSPECIFIED OSTEOPOROSIS TYPE: ICD-10-CM

## 2023-12-29 DIAGNOSIS — Z00.00 ANNUAL PHYSICAL EXAM: Primary | ICD-10-CM

## 2023-12-29 LAB
25(OH)D3+25(OH)D2 SERPL-MCNC: 41 NG/ML (ref 30–96)
ALBUMIN SERPL BCP-MCNC: 3.9 G/DL (ref 3.5–5.2)
ALP SERPL-CCNC: 46 U/L (ref 55–135)
ALT SERPL W/O P-5'-P-CCNC: 11 U/L (ref 10–44)
ANION GAP SERPL CALC-SCNC: 12 MMOL/L (ref 8–16)
AST SERPL-CCNC: 20 U/L (ref 10–40)
BASOPHILS # BLD AUTO: 0.04 K/UL (ref 0–0.2)
BASOPHILS NFR BLD: 0.6 % (ref 0–1.9)
BILIRUB SERPL-MCNC: 1 MG/DL (ref 0.1–1)
BUN SERPL-MCNC: 13 MG/DL (ref 10–30)
CALCIUM SERPL-MCNC: 10.3 MG/DL (ref 8.7–10.5)
CHLORIDE SERPL-SCNC: 100 MMOL/L (ref 95–110)
CO2 SERPL-SCNC: 31 MMOL/L (ref 23–29)
CREAT SERPL-MCNC: 0.8 MG/DL (ref 0.5–1.4)
DIFFERENTIAL METHOD: ABNORMAL
EOSINOPHIL # BLD AUTO: 0.1 K/UL (ref 0–0.5)
EOSINOPHIL NFR BLD: 1.9 % (ref 0–8)
ERYTHROCYTE [DISTWIDTH] IN BLOOD BY AUTOMATED COUNT: 13.8 % (ref 11.5–14.5)
EST. GFR  (NO RACE VARIABLE): >60 ML/MIN/1.73 M^2
GLUCOSE SERPL-MCNC: 83 MG/DL (ref 70–110)
HCT VFR BLD AUTO: 42.2 % (ref 37–48.5)
HGB BLD-MCNC: 15.1 G/DL (ref 12–16)
IMM GRANULOCYTES # BLD AUTO: 0.01 K/UL (ref 0–0.04)
IMM GRANULOCYTES NFR BLD AUTO: 0.1 % (ref 0–0.5)
LYMPHOCYTES # BLD AUTO: 2.9 K/UL (ref 1–4.8)
LYMPHOCYTES NFR BLD: 42.3 % (ref 18–48)
MCH RBC QN AUTO: 32.4 PG (ref 27–31)
MCHC RBC AUTO-ENTMCNC: 35.8 G/DL (ref 32–36)
MCV RBC AUTO: 91 FL (ref 82–98)
MONOCYTES # BLD AUTO: 0.8 K/UL (ref 0.3–1)
MONOCYTES NFR BLD: 11.8 % (ref 4–15)
NEUTROPHILS # BLD AUTO: 3 K/UL (ref 1.8–7.7)
NEUTROPHILS NFR BLD: 43.3 % (ref 38–73)
NRBC BLD-RTO: 0 /100 WBC
PLATELET # BLD AUTO: 208 K/UL (ref 150–450)
PMV BLD AUTO: 9.5 FL (ref 9.2–12.9)
POTASSIUM SERPL-SCNC: 4.2 MMOL/L (ref 3.5–5.1)
PROT SERPL-MCNC: 7.8 G/DL (ref 6–8.4)
RBC # BLD AUTO: 4.66 M/UL (ref 4–5.4)
SODIUM SERPL-SCNC: 143 MMOL/L (ref 136–145)
TSH SERPL DL<=0.005 MIU/L-ACNC: 0.44 UIU/ML (ref 0.4–4)
WBC # BLD AUTO: 6.86 K/UL (ref 3.9–12.7)

## 2023-12-29 PROCEDURE — 82306 VITAMIN D 25 HYDROXY: CPT | Performed by: INTERNAL MEDICINE

## 2023-12-29 PROCEDURE — 85025 COMPLETE CBC W/AUTO DIFF WBC: CPT | Performed by: INTERNAL MEDICINE

## 2023-12-29 PROCEDURE — 84443 ASSAY THYROID STIM HORMONE: CPT | Performed by: INTERNAL MEDICINE

## 2023-12-29 PROCEDURE — 99213 OFFICE O/P EST LOW 20 MIN: CPT | Mod: PBBFAC | Performed by: INTERNAL MEDICINE

## 2023-12-29 PROCEDURE — 99999 PR PBB SHADOW E&M-EST. PATIENT-LVL III: CPT | Mod: PBBFAC,,, | Performed by: INTERNAL MEDICINE

## 2023-12-29 PROCEDURE — 80053 COMPREHEN METABOLIC PANEL: CPT | Performed by: INTERNAL MEDICINE

## 2023-12-29 PROCEDURE — 99204 OFFICE O/P NEW MOD 45 MIN: CPT | Mod: S$PBB,,, | Performed by: INTERNAL MEDICINE

## 2023-12-29 RX ORDER — DENOSUMAB 60 MG/ML
INJECTION SUBCUTANEOUS
COMMUNITY
Start: 2023-10-04 | End: 2024-01-01

## 2024-01-01 ENCOUNTER — TELEPHONE (OUTPATIENT)
Dept: INTERNAL MEDICINE | Facility: CLINIC | Age: 89
End: 2024-01-01
Payer: MEDICARE

## 2024-01-01 NOTE — PROGRESS NOTES
Subjective:       Patient ID: Sonja Weeks is a 89 y.o. female who presents today for:    Chief Complaint:   Chief Complaint   Patient presents with    Annual Exam       HPI:  Very pleasant 89-year-old female nonsmoker who is here to establish [facility].  She just moved to the [place] area in his living at [facility].  She has a past medical history significant for osteoporosis and in early 2020 fell and a marble floor onto her left shoulder and suffered multiple fractures of the shoulder girdle.  She has good range of motion of her shoulder but has significant tightness in the posterior thorax muscles extending into her neck.  This pain is constant and is affecting her daily living.  She is undergoing physical therapy at [facility] help with the neck with stretching and strengthening exercises.  She also walks up and down stairs as a form of exercise.  She plays bridge of play and is quite good.  She raised her kids for the most part in the [place] area.  She is still yet to establish with a MD here at [facility] who could help her with her left-sided cervalgia.  She prefers not to take medications but is open to taking arthritis strength Tylenol especially in the evening.    She has aged out of mammograms and colonoscopies and is fine with that.  She has seen [facility] here in [place] area.  She does not have an eye doctor and would like to set up here at [facility].  Last bone density was done this month at [facility].  She has a heart murmur and has not yet had an echocardiogram of her heart    Review of Systems   Constitutional:  Negative for chills, fever and weight loss.   HENT:  Negative for sore throat.    Eyes:  Negative for blurred vision and double vision.   Respiratory:  Negative for cough and shortness of breath.    Cardiovascular:  Negative for chest pain and palpitations.   Gastrointestinal:  Negative for constipation, diarrhea, nausea and vomiting.   Genitourinary:  Negative for dysuria  and hematuria.   Musculoskeletal:  Positive for neck pain. Negative for joint pain and myalgias.   Skin:  Negative for itching and rash.   Neurological:  Positive for headaches. Negative for sensory change and focal weakness.        Some short-term memory loss as observed by both patient and daughter but it is not preventing her from doing all of her social activities to include exercise and bridge   Endo/Heme/Allergies:  Does not bruise/bleed easily.   Psychiatric/Behavioral:  Negative for depression and suicidal ideas.         Medications:  Outpatient Encounter Medications as of 12/29/2023   Medication Sig Dispense Refill    amLODIPine (NORVASC) 2.5 MG tablet Take 1 tablet (2.5 mg total) by mouth once daily. 90 tablet 3    cholecalciferol, vitamin D3, (VITAMIN D3) 25 mcg (1,000 unit) capsule Take 1,000 Units by mouth once daily.      hydroCHLOROthiazide (HYDRODIURIL) 25 MG tablet Take 1 tablet (25 mg total) by mouth once daily. 90 tablet 3    levothyroxine (SYNTHROID) 112 MCG tablet Take 1 tablet (112 mcg total) by mouth before breakfast. 90 tablet 3    PROLIA 60 mg/mL Syrg Inject into the skin.      rosuvastatin (CRESTOR) 5 MG tablet Take 1 tablet (5 mg total) by mouth once daily. 90 tablet 3    [DISCONTINUED] diclofenac sodium (VOLTAREN) 1 % Gel Apply 2 g topically once daily. for 5 days 20 g 0    [DISCONTINUED] nirmatrelvir-ritonavir (PAXLOVID) 300 mg (150 mg x 2)-100 mg copackaged tablets (EUA) Take 3 tablets by mouth 2 (two) times daily. Each dose contains 2 nirmatrelvir (pink tablets) and 1 ritonavir (white tablet). Take all 3 tablets together. 15 tablet 0    [DISCONTINUED] nystatin (MYCOSTATIN) cream Apply topically.       No facility-administered encounter medications on file as of 12/29/2023.       Allergies:  Review of patient's allergies indicates:   Allergen Reactions    Codeine     Pcn [penicillins]        Health Maintenance:  Immunization History   Administered Date(s) Administered    COVID-19, mRNA,  "LNP-S, bivalent booster, PF (Moderna Omicron) 10/14/2022    COVID-19, mRNA, LNP-S, bivalent booster, PF (Moderna Omicron)12 + YEARS 10/14/2022    Influenza - Quadrivalent 10/08/2020    Influenza - Quadrivalent - High Dose - PF (65 years and older) 10/14/2022    Pneumococcal Conjugate - 13 Valent 10/31/2019, 11/07/2019      Health Maintenance   Topic Date Due    TETANUS VACCINE  Never done    Shingles Vaccine (1 of 2) Never done    Lipid Panel  08/11/2028          Objective:      Vital Signs  Pulse: 62  SpO2: 95 %  BP: 130/60  Pain Score:   7  Height and Weight  Height: 6' 2" (188 cm)  Weight: 65.4 kg (144 lb 2.9 oz)  BSA (Calculated - sq m): 1.85 sq meters  BMI (Calculated): 18.5  Weight in (lb) to have BMI = 25: 194.3]    Physical Exam  Vitals and nursing note reviewed.   Constitutional:       Appearance: She is well-developed.   HENT:      Head: Normocephalic and atraumatic.   Eyes:      General: No scleral icterus.        Right eye: No discharge.         Left eye: No discharge.   Neck:      Thyroid: No thyromegaly.      Vascular: No carotid bruit or JVD.   Cardiovascular:      Rate and Rhythm: Normal rate and regular rhythm.      Heart sounds: Murmur heard.      Crescendo systolic murmur is present with a grade of 2/6.      No friction rub. No gallop.   Pulmonary:      Effort: Pulmonary effort is normal.      Breath sounds: Normal breath sounds. No wheezing or rales.   Abdominal:      General: Bowel sounds are normal. There is no distension.      Palpations: Abdomen is soft.      Tenderness: There is no abdominal tenderness.   Musculoskeletal:         General: No tenderness.      Cervical back: Neck supple. Pain with movement and muscular tenderness present. No spinous process tenderness. Decreased range of motion.   Lymphadenopathy:      Cervical: No cervical adenopathy.      Right cervical: No superficial, deep or posterior cervical adenopathy.     Left cervical: No superficial, deep or posterior cervical " adenopathy.   Skin:     Findings: No erythema or rash.   Neurological:      Mental Status: She is alert and oriented to person, place, and time.      Cranial Nerves: No cranial nerve deficit.        Lab Results   Component Value Date    WBC 6.86 12/29/2023    HGB 15.1 12/29/2023    HCT 42.2 12/29/2023     12/29/2023    ALT 11 12/29/2023    AST 20 12/29/2023     12/29/2023    K 4.2 12/29/2023     12/29/2023    CREATININE 0.8 12/29/2023    BUN 13 12/29/2023    CO2 31 (H) 12/29/2023    TSH 0.436 12/29/2023       Assessment/plan:     Sonja Weeks is a 89 y.o.female with:    Left sided cervicalgia of [id number] region-increased since major fall and shoulder fracture on the left you during early COVID months of 2020.   Referred to physical medicine and rehab/sports medicine in someone you could do dry needling possibly Botox on the left paraspinal muscle connected to the upper back and shoulder girdle.   In the interim Voltaren gel 3 times a day alternating with heat, cervical pillow and continue with cream physical therapy which stretching exercises of the neck.   Will defer further imaging outside of cervical spine x-rays that were done in early October of this year.  They did not show anything ominous.    Benign essential HTN  -     free continue with amlodipine 2.5 and hydrochlorothiazide 25 mg daily for blood pressure control work    Aortic heart murmur  -     Echo; Future    Hyperlipidemia type II   Continue with rosuvastatin 5 mg daily  Osteoporosis without current pathological fracture, unspecified osteoporosis type   Continue with adequate vitamin-D and calcium supplementation as you are doing and we will schedule the next Prolia for March here at [facility].  The infusion center may call you and if you do not hear from them within the next 6 weeks please call our  office    Hypothyroidism due to Hashimoto's thyroiditis   Continue with levothyroxine 112 mcg  daily.    Visual changes  -     Ambulatory referral/consult to Optometry; Future; Expected date: 01/05/2024            Future Appointments   Date Time Provider Department Center   3/7/2024  2:00 PM INJECTION, [facility]       Cat Ogden MD  [facility]

## 2024-01-01 NOTE — TELEPHONE ENCOUNTER
CH pt needs:    1) someone needs to document the Prevnar 20 that was given on Friday    2) needs to be set up for an echocardiogram as ordered    3) most importantly Dr. Mercy Walters I placed a referral for sports at [facility] as for her neck and upper back pain.  I was also trying to get Dr. Wills even though she is physical medicine rehab and sports she does not come up as an option and this problem before I do not know if Denia you can look into this?    4) he moved here from [place] a year and a half ago like to establish with the optometry department referral and sometimes they you call I do not know message over here she has already had her cataracts done but need of great her glasses.  Again she has not seen an eye doctor in a year and a half     Also call the daughter, Mirlande Grove  for all appointments and then the patient as a courtesy.  Thank you!

## 2024-01-09 ENCOUNTER — HOSPITAL ENCOUNTER (OUTPATIENT)
Dept: CARDIOLOGY | Facility: HOSPITAL | Age: 89
Discharge: HOME | End: 2024-01-09
Attending: INTERNAL MEDICINE
Payer: MEDICARE

## 2024-01-09 VITALS
HEIGHT: 62 IN | WEIGHT: 144 LBS | DIASTOLIC BLOOD PRESSURE: 60 MMHG | SYSTOLIC BLOOD PRESSURE: 130 MMHG | HEART RATE: 70 BPM | BODY MASS INDEX: 26.5 KG/M2

## 2024-01-09 DIAGNOSIS — I35.8 AORTIC HEART MURMUR: ICD-10-CM

## 2024-01-09 DIAGNOSIS — I10 BENIGN ESSENTIAL HTN: ICD-10-CM

## 2024-01-09 LAB
ASCENDING AORTA: 2.9 CM
AV INDEX (PROSTH): 0.66
AV MEAN GRADIENT: 8 MMHG
AV PEAK GRADIENT: 19 MMHG
AV VALVE AREA BY VELOCITY RATIO: 1.96 CM²
AV VALVE AREA: 2.36 CM²
AV VELOCITY RATIO: 0.55
BSA FOR ECHO PROCEDURE: 1.69 M2
CV ECHO LV RWT: 0.61 CM
DOP CALC AO PEAK VEL: 2.2 M/S
DOP CALC AO VTI: 45.88 CM
DOP CALC LVOT AREA: 3.6 CM2
DOP CALC LVOT DIAMETER: 2.13 CM
DOP CALC LVOT PEAK VEL: 1.21 M/S
DOP CALC LVOT STROKE VOLUME: 108.09 CM3
DOP CALCLVOT PEAK VEL VTI: 30.35 CM
E WAVE DECELERATION TIME: 321.5 MSEC
E/A RATIO: 0.59
E/E' RATIO: 8 M/S
ECHO LV POSTERIOR WALL: 1.17 CM (ref 0.6–1.1)
FRACTIONAL SHORTENING: 33 % (ref 28–44)
INTERVENTRICULAR SEPTUM: 0.91 CM (ref 0.6–1.1)
IVRT: 121.79 MSEC
LA MAJOR: 4.95 CM
LA MINOR: 5.29 CM
LA WIDTH: 4.15 CM
LEFT ATRIUM SIZE: 3.37 CM
LEFT ATRIUM VOLUME INDEX MOD: 35.7 ML/M2
LEFT ATRIUM VOLUME INDEX: 36.6 ML/M2
LEFT ATRIUM VOLUME MOD: 59.24 CM3
LEFT ATRIUM VOLUME: 60.8 CM3
LEFT INTERNAL DIMENSION IN SYSTOLE: 2.6 CM (ref 2.1–4)
LEFT VENTRICLE DIASTOLIC VOLUME INDEX: 38.75 ML/M2
LEFT VENTRICLE DIASTOLIC VOLUME: 64.32 ML
LEFT VENTRICLE MASS INDEX: 77 G/M2
LEFT VENTRICLE SYSTOLIC VOLUME INDEX: 14.8 ML/M2
LEFT VENTRICLE SYSTOLIC VOLUME: 24.64 ML
LEFT VENTRICULAR INTERNAL DIMENSION IN DIASTOLE: 3.86 CM (ref 3.5–6)
LEFT VENTRICULAR MASS: 127.12 G
LV LATERAL E/E' RATIO: 6.5 M/S
LV SEPTAL E/E' RATIO: 10.4 M/S
MV A" WAVE DURATION": 23.22 MSEC
MV PEAK A VEL: 0.88 M/S
MV PEAK E VEL: 0.52 M/S
MV STENOSIS PRESSURE HALF TIME: 93.24 MS
MV VALVE AREA P 1/2 METHOD: 2.36 CM2
PISA TR MAX VEL: 2.7 M/S
PULM VEIN S/D RATIO: 2
PV PEAK D VEL: 0.25 M/S
PV PEAK S VEL: 0.5 M/S
RA MAJOR: 4.62 CM
RA PRESSURE ESTIMATED: 3 MMHG
RA WIDTH: 3.68 CM
RIGHT VENTRICULAR END-DIASTOLIC DIMENSION: 3.16 CM
RV TB RVSP: 6 MMHG
SINUS: 2.94 CM
STJ: 2.43 CM
TDI LATERAL: 0.08 M/S
TDI SEPTAL: 0.05 M/S
TDI: 0.07 M/S
TR MAX PG: 29 MMHG
TRICUSPID ANNULAR PLANE SYSTOLIC EXCURSION: 2.28 CM
TV REST PULMONARY ARTERY PRESSURE: 32 MMHG
Z-SCORE OF LEFT VENTRICULAR DIMENSION IN END DIASTOLE: -1.86
Z-SCORE OF LEFT VENTRICULAR DIMENSION IN END SYSTOLE: -0.81

## 2024-01-09 PROCEDURE — 93306 TTE W/DOPPLER COMPLETE: CPT | Mod: 26,,, | Performed by: INTERNAL MEDICINE

## 2024-01-09 PROCEDURE — 93306 TTE W/DOPPLER COMPLETE: CPT

## 2024-02-26 ENCOUNTER — OFFICE VISIT (OUTPATIENT)
Dept: PAIN MEDICINE | Facility: CLINIC | Age: 89
End: 2024-02-26
Payer: MEDICARE

## 2024-02-26 VITALS
SYSTOLIC BLOOD PRESSURE: 173 MMHG | DIASTOLIC BLOOD PRESSURE: 83 MMHG | HEART RATE: 55 BPM | TEMPERATURE: 98 F | WEIGHT: 145.75 LBS | BODY MASS INDEX: 26.82 KG/M2 | RESPIRATION RATE: 18 BRPM | OXYGEN SATURATION: 98 % | HEIGHT: 62 IN

## 2024-02-26 DIAGNOSIS — M47.812 CERVICAL SPONDYLOSIS: ICD-10-CM

## 2024-02-26 DIAGNOSIS — M50.30 DDD (DEGENERATIVE DISC DISEASE), CERVICAL: ICD-10-CM

## 2024-02-26 DIAGNOSIS — M79.18 MYALGIA OF MUSCLE OF NECK: ICD-10-CM

## 2024-02-26 DIAGNOSIS — M54.2 CERVICALGIA OF OCCIPITO-ATLANTO-AXIAL REGION: Primary | ICD-10-CM

## 2024-02-26 PROCEDURE — 99999PBSHW PR PBB SHADOW TECHNICAL ONLY FILED TO HB: Mod: PBBFAC,,,

## 2024-02-26 PROCEDURE — 99999 PR PBB SHADOW E&M-EST. PATIENT-LVL V: CPT | Mod: PBBFAC,,, | Performed by: ANESTHESIOLOGY

## 2024-02-26 PROCEDURE — 99204 OFFICE O/P NEW MOD 45 MIN: CPT | Mod: 25,S$PBB,, | Performed by: ANESTHESIOLOGY

## 2024-02-26 PROCEDURE — 99215 OFFICE O/P EST HI 40 MIN: CPT | Mod: PBBFAC | Performed by: ANESTHESIOLOGY

## 2024-02-26 PROCEDURE — 20552 NJX 1/MLT TRIGGER POINT 1/2: CPT | Mod: S$PBB,,, | Performed by: ANESTHESIOLOGY

## 2024-02-26 PROCEDURE — 20552 NJX 1/MLT TRIGGER POINT 1/2: CPT | Mod: PBBFAC | Performed by: ANESTHESIOLOGY

## 2024-02-26 RX ORDER — DEXAMETHASONE SODIUM PHOSPHATE 4 MG/ML
4 INJECTION, SOLUTION INTRA-ARTICULAR; INTRALESIONAL; INTRAMUSCULAR; INTRAVENOUS; SOFT TISSUE
Status: COMPLETED | OUTPATIENT
Start: 2024-02-26 | End: 2024-02-26

## 2024-02-26 RX ADMIN — DEXAMETHASONE SODIUM PHOSPHATE 4 MG: 4 INJECTION INTRA-ARTICULAR; INTRALESIONAL; INTRAMUSCULAR; INTRAVENOUS; SOFT TISSUE at 03:02

## 2024-02-26 NOTE — PROGRESS NOTES
Chronic Pain - New Consult    Referring Physician: Cat Oconnell MD    Chief Complaint:   Chief Complaint   Patient presents with    Neck Pain     Pain is on left side pain...cannot turn her head that well but pain is at a 5   Left ear pain     Low-back Pain        SUBJECTIVE:    Sonja Weeks has significant pmhx of osteoporosis who presents to the clinic for the evaluation of neck pain. The pain started spontaneously one year ago and symptoms have been unchanged.The pain is located in the upper neck area.  The pain is described as sharp and is rated as 5/10. The pain is rated with a score of  0/10 on the BEST day and a score of 7/10 on the WORST day.  Symptoms interfere with daily activity. The pain is exacerbated by nothing, pain spontaneously comes and goes.  The pain is mitigated by heat, ice, massage, and voltaren. The patient reports 7 hours of uninterrupted sleep per night. Of significance, patient fell early 2020 and suffered multiple fractures of the shoulder girdle. S/p left total shoulder replacement    Patient denies urinary incontinence, bowel incontinence, significant weight loss, significant motor weakness, and loss of sensations.    Physical Therapy/Home Exercise:   - Physical Therapy: October 2023-February 2024. Once a week  - HEP: Yes    Pain Disability Index Review:      2/26/2024     1:44 PM   Last 3 PDI Scores   Pain Disability Index (PDI) 35       Pain Medications:  - Heat/ice (moderate relief)  - Voltaren (moderate relief)     report:  Not applicable    Pain Procedures:   - None    Imaging:   EXAMINATION:  XR CERVICAL SPINE 2 OR 3 VIEWS     CLINICAL HISTORY:  Cervicalgia     TECHNIQUE:  AP, lateral and open mouth views of the cervical spine were performed.     COMPARISON:  None.     FINDINGS:  There is osteopenia.  There is grade 1 anterolisthesis of C7 on T1.  Alignment is otherwise unremarkable.  There are multilevel degenerative changes.  There is disc height loss at C6-C7.   There is no acute fracture or subluxation.  The vertebral body heights are maintained.  Prevertebral soft tissues are within normal limits.  Partially visualized left shoulder arthroplasty changes noted.     Impression:     No acute osseous abnormality of the cervical spine.        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 fracture too    TOTAL KNEE ARTHROPLASTY Right     many years - partial     Social History     Socioeconomic History    Marital status:    Tobacco Use    Smoking status: Former    Smokeless tobacco: Never   Substance and Sexual Activity    Alcohol use: Yes    Drug use: Never    Sexual activity: Not Currently     Family History   Problem Relation Age of Onset    Heart disease Mother     Heart disease Father     Cancer Brother         brain       Review of patient's allergies indicates:   Allergen Reactions    Codeine     Pcn [penicillins]        Current Outpatient Medications   Medication Sig    amLODIPine (NORVASC) 2.5 MG tablet Take 1 tablet (2.5 mg total) by mouth once daily.    cholecalciferol, vitamin D3, (VITAMIN D3) 25 mcg (1,000 unit) capsule Take 1,000 Units by mouth once daily.    hydroCHLOROthiazide (HYDRODIURIL) 25 MG tablet Take 1 tablet (25 mg total) by mouth once daily.    levothyroxine (SYNTHROID) 112 MCG tablet Take 1 tablet (112 mcg total) by mouth before breakfast.    rosuvastatin (CRESTOR) 5 MG tablet Take 1 tablet (5 mg total) by mouth once  "daily.     No current facility-administered medications for this visit.       REVIEW OF SYSTEMS:    GENERAL:  No weight loss, malaise or fevers.  HEENT:  Negative for frequent or significant headaches.  NECK:  Negative for lumps, goiter, pain and significant neck swelling.  RESPIRATORY:  Negative for cough, wheezing or shortness of breath.  CARDIOVASCULAR:  Negative for chest pain, leg swelling or palpitations.  GI:  Negative for abdominal discomfort, blood in stools or black stools or change in bowel habits.  MUSCULOSKELETAL:  See HPI.  SKIN:  Negative for lesions, rash, and itching.  PSYCH:  Negative for sleep disturbance, mood disorder and recent psychosocial stressors.  HEMATOLOGY/LYMPHOLOGY:  Negative for prolonged bleeding, bruising easily or swollen nodes.  NEURO:   No history of headaches, syncope, paralysis, seizures or tremors.  All other reviewed and negative other than HPI.    OBJECTIVE:    BP (!) 173/83   Pulse (!) 55   Temp 97.5 °F (36.4 °C)   Resp 18   Ht 5' 2" (1.575 m)   Wt 66.1 kg (145 lb 11.6 oz)   LMP  (LMP Unknown)   SpO2 98%   BMI 26.65 kg/m²     PHYSICAL EXAMINATION:    General appearance: Well appearing, in no acute distress, alert and oriented x3.  Psych:  Mood and affect appropriate.  Skin: Skin color, texture, turgor normal, no rashes or lesions, in both upper and lower body.  Head/face:  Normocephalic, atraumatic. No palpable lymph nodes.  Neck: Tenderness to palpation of left occipital region. Tenderness to palpation to left paraspinal and SCM muscles. No pain to palpation over the cervical paraspinous muscles. Spurling Negative. No pain with neck flexion, extension, or lateral flexion.   Cor: RRR  Pulm: CTA  GI:  Soft and non-tender.  Back: Straight leg raising in the sitting and supine positions is negative to radicular pain. No pain to palpation over the spine or costovertebral angles. Normal range of motion without pain reproduction.  Extremities: Peripheral joint ROM is full " and pain free without obvious instability or laxity in all four extremities. No deformities, edema, or skin discoloration. Good capillary refill.  Musculoskeletal: Shoulder, hip, sacroiliac and knee provocative maneuvers are negative. Bilateral upper and lower extremity strength is normal and symmetric.  No atrophy or tone abnormalities are noted.  Neuro: Bilateral upper and lower extremity coordination and muscle stretch reflexes are physiologic and symmetric.  Plantar response are downgoing. No loss of sensation is noted.  Gait: normal.    ASSESSMENT: 89 y.o. year old female with neck pain pain, consistent with      1. Cervicalgia of jihfyazd-loskeql-rtets region  Ambulatory referral/consult to Sports Medicine    MRI Head-Orbits Without Contrast    MRI Soft Tissue Neck Without Contrast    Ambulatory referral/consult to Physical/Occupational Therapy      2. Myalgia of muscle of neck        3. Cervical spondylosis        4. DDD (degenerative disc disease), cervical              PLAN:     - A trigger point injection was performed at the site of maximal tenderness using bupivacaine and dexamethasone. This was well tolerated, and followed by moderate relief of pain.  - Order brain and neck MRI  - We will consider occipital nerve block vs cervical MBB pending imaging results.  - Continue home medications  - Continue heat/ice  - Continue physical therapy. Referral placed for PT with dry needling  - RTC in 4 weeks to go over MRI results  - Counseled patient regarding the importance of activity modification and physical therapy.    The above plan and management options were discussed at length with patient. Patient is in agreement with the above and verbalized understanding. It will be communicated with the referring physician via electronic record, fax, or mail.    Zina Alex  02/26/2024      Patient Name: Sonja Weeks  MRN: [id number]    INFORMED CONSENT: The procedure, risks, benefits and options were discussed  with patient. There are no contraindications to the procedure. The patient expressed understanding and agreed to proceed. The personnel performing the procedure was discussed. I verify that I personally obtained Sonja Weeks's consent prior to the start of the procedure and the signed consent can be found on the patient's chart.    Procedure Date: 02/26/2024    Anesthesia: None    Pre Procedure diagnosis: M79.1 Myalgia    Post-Procedure diagnosis: same    Sedation: None    PROCEDURE: CERVICAL TRIGGER POINT INJECTION  The patient was placed in a seated position and time out was perfomed. The patient's  trigger points were identified and marked within each muscle. The skin was prepped with chlorhexidine three times.  The muscle was grasped between the thumb and forefinger and a 27-gauge 1.5 inch  needle was advanced through the skin and subcutaneous tissues and into the muscle at each location. Aspiration for blood, air and CSF was negative.  A total of 4 ml of Bupivacaine 0.25% and 4 mg dexamethasone was divided among the trigger points. The needle was removed intact each time and bleeding was nil. No complications were evident.     Zina Alex DO  02/26/2024    I spent a total of 30 minutes on the day of the visit.  This includes face to face time and non-face to face time preparing to see the patient by reviewing previous labs/imaging, obtaining and/or reviewing separately obtained history, documenting clinical information in the electronic or other health record, independently interpreting results and communicating results to the patient/family/caregiver.    Zana Guo

## 2024-02-27 ENCOUNTER — INFUSION (OUTPATIENT)
Dept: INFECTIOUS DISEASES | Facility: HOSPITAL | Age: 89
End: 2024-02-27
Attending: INTERNAL MEDICINE
Payer: MEDICARE

## 2024-02-27 VITALS
HEIGHT: 62 IN | OXYGEN SATURATION: 92 % | TEMPERATURE: 97 F | RESPIRATION RATE: 21 BRPM | DIASTOLIC BLOOD PRESSURE: 67 MMHG | SYSTOLIC BLOOD PRESSURE: 152 MMHG | WEIGHT: 146.25 LBS | BODY MASS INDEX: 26.91 KG/M2 | HEART RATE: 62 BPM

## 2024-02-27 DIAGNOSIS — M81.0 AGE-RELATED OSTEOPOROSIS WITHOUT CURRENT PATHOLOGICAL FRACTURE: Primary | ICD-10-CM

## 2024-02-27 DIAGNOSIS — Z00.00 ENCOUNTER FOR MEDICARE ANNUAL WELLNESS EXAM: ICD-10-CM

## 2024-02-27 PROCEDURE — 96372 THER/PROPH/DIAG INJ SC/IM: CPT

## 2024-02-27 PROCEDURE — 63600175 PHARM REV CODE 636 W HCPCS: Mod: JZ,JG | Performed by: INTERNAL MEDICINE

## 2024-02-27 RX ADMIN — DENOSUMAB 60 MG: 60 INJECTION SUBCUTANEOUS at 11:02

## 2024-02-27 NOTE — PROGRESS NOTES
Patient arrives for prolia injection - confirms use of calcium and vitamin D supplements and denies dental procedures over past 3 months - administered per guidelines.    Limited head-to-toe assessment due to privacy issues and visit reason though the opportunity was given for patient to express any concerns

## 2024-04-10 ENCOUNTER — OFFICE VISIT (OUTPATIENT)
Dept: INTERNAL MEDICINE | Facility: CLINIC | Age: 89
End: 2024-04-10
Payer: MEDICARE

## 2024-04-10 DIAGNOSIS — J02.9 ACUTE PHARYNGITIS, UNSPECIFIED ETIOLOGY: Primary | ICD-10-CM

## 2024-04-10 PROCEDURE — 99499 UNLISTED E&M SERVICE: CPT | Mod: 95,,, | Performed by: INTERNAL MEDICINE

## 2024-04-10 RX ORDER — BENZONATATE 100 MG/1
100 CAPSULE ORAL 3 TIMES DAILY PRN
Qty: 30 CAPSULE | Refills: 0 | Status: SHIPPED | OUTPATIENT
Start: 2024-04-10 | End: 2024-04-20

## 2024-04-10 RX ORDER — AZITHROMYCIN 250 MG/1
TABLET, FILM COATED ORAL
Qty: 6 TABLET | Refills: 0 | Status: SHIPPED | OUTPATIENT
Start: 2024-04-10 | End: 2024-04-15

## 2024-04-16 NOTE — PROGRESS NOTES
Audio Only Telehealth Visit     The patient location is:  Home in [place] at [facility]  The chief complaint leading to consultation is:  Cough and congestion x3 or 4 days  Visit type: Virtual visit with audio only (telephone)  Total time spent with patient:  Very pleasant 89-year-old nonsmoking female who lives in the [facility] in his on an audio call with her daughter.  She is concerned she is having company over this weekend which is about 3 days and she has been having a dry cough for 3 or 4 days with some nasal congestion.  They have not yet tested for COVID but we will get a home test and tests for that.  He has not having any fever or chills or any confusion.  She does have a scratchy throat.  The cough is most annoying.     The reason for the audio only service rather than synchronous audio and video virtual visit was related to technical difficulties or patient preference/necessity.     Each patient to whom I provide medical services by telemedicine is:  (1) informed of the relationship between the physician and patient and the respective role of any other health care provider with respect to management of the patient; and (2) notified that they may decline to receive medical services by telemedicine and may withdraw from such care at any time. Patient verbally consented to receive this service via voice-only telephone call.       HPI:  As above     Assessment and plan:    Recommend doing a COVID test and if negative can go out and about this weekend.  For the cough would do some Robitussin DM or Mucinex DM or Delsym fairly around the clock and I will prescribe some Astelin to use twice a day to help drive the congestion.  She may also benefit from some Tessalon Perles and always can wear a mask when within 3-4 feet of her friends.                        This service was not originating from a related E/M service provided within the previous 7 days nor will  to an E/M service or procedure within  the next 24 hours or my soonest available appointment.  Prevailing standard of care was able to be met in this audio-only visit.

## 2024-05-07 ENCOUNTER — OFFICE VISIT (OUTPATIENT)
Dept: INTERNAL MEDICINE | Facility: CLINIC | Age: 89
End: 2024-05-07
Payer: MEDICARE

## 2024-05-07 VITALS
BODY MASS INDEX: 25.95 KG/M2 | OXYGEN SATURATION: 97 % | HEART RATE: 56 BPM | HEIGHT: 62 IN | DIASTOLIC BLOOD PRESSURE: 70 MMHG | WEIGHT: 141 LBS | SYSTOLIC BLOOD PRESSURE: 136 MMHG

## 2024-05-07 DIAGNOSIS — M81.0 OSTEOPOROSIS WITHOUT CURRENT PATHOLOGICAL FRACTURE, UNSPECIFIED OSTEOPOROSIS TYPE: ICD-10-CM

## 2024-05-07 DIAGNOSIS — I10 BENIGN ESSENTIAL HTN: ICD-10-CM

## 2024-05-07 DIAGNOSIS — R09.82 POST-NASAL DRIP: ICD-10-CM

## 2024-05-07 DIAGNOSIS — M54.2 CERVICALGIA OF OCCIPITO-ATLANTO-AXIAL REGION: Primary | ICD-10-CM

## 2024-05-07 PROCEDURE — 99213 OFFICE O/P EST LOW 20 MIN: CPT | Mod: PBBFAC,25 | Performed by: INTERNAL MEDICINE

## 2024-05-07 PROCEDURE — 90677 PCV20 VACCINE IM: CPT | Mod: PBBFAC

## 2024-05-07 PROCEDURE — 99214 OFFICE O/P EST MOD 30 MIN: CPT | Mod: S$PBB,,, | Performed by: INTERNAL MEDICINE

## 2024-05-07 PROCEDURE — 99999 PR PBB SHADOW E&M-EST. PATIENT-LVL III: CPT | Mod: PBBFAC,,, | Performed by: INTERNAL MEDICINE

## 2024-05-07 PROCEDURE — 99999PBSHW PNEUMOCOCCAL CONJUGATE VACCINE 20-VALENT: Mod: PBBFAC,,,

## 2024-05-07 NOTE — PROGRESS NOTES
Subjective:       Patient ID: Sonja Weeks is a 89 y.o. female who presents today for:    Chief Complaint:   Chief Complaint   Patient presents with    Follow-up     Neck, back pain. Had injection       HPI:  Very pleasant 89-year-old female who lives at the [facility] independent living and is accompanied by her daughter as a follow-up for left-sided occipital cervical discomfort that began a few years ago after a fall requiring reconstruction of her left shoulder.  The discomfort is on the left side and back in February she had trigger point injections which may help for short period of time but subsequently she followed up at [facility] physical therapy and had dry needling which the patient recalls health significantly.  She would like another prescription to go to outpatient physical therapy.    Additionally about 3 weeks ago she had bad cold, cough and congestion and we treated symptomatically with Robitussin DM and Flonase.  Tessalon Perles did not help at all.  She still has a mild postnasal drip cough.    Review of Systems   Constitutional:  Negative for chills, fever and weight loss.   HENT:  Negative for sore throat.    Eyes:  Negative for blurred vision and double vision.   Respiratory:  Positive for cough. Negative for shortness of breath.    Cardiovascular:  Negative for chest pain and palpitations.   Gastrointestinal:  Negative for constipation, diarrhea, nausea and vomiting.   Genitourinary:  Negative for dysuria and hematuria.   Musculoskeletal:  Positive for neck pain. Negative for joint pain and myalgias.   Skin:  Negative for itching and rash.   Neurological:  Negative for sensory change, focal weakness and headaches.   Endo/Heme/Allergies:  Does not bruise/bleed easily.   Psychiatric/Behavioral:  Negative for depression and suicidal ideas.         Medications:  Outpatient Encounter Medications as of 5/7/2024   Medication Sig Dispense Refill    amLODIPine (NORVASC) 2.5 MG tablet Take 1  "tablet (2.5 mg total) by mouth once daily. 90 tablet 3    cholecalciferol, vitamin D3, (VITAMIN D3) 25 mcg (1,000 unit) capsule Take 1,000 Units by mouth once daily.      hydroCHLOROthiazide (HYDRODIURIL) 25 MG tablet Take 1 tablet (25 mg total) by mouth once daily. 90 tablet 3    levothyroxine (SYNTHROID) 112 MCG tablet Take 1 tablet (112 mcg total) by mouth before breakfast. 90 tablet 3    rosuvastatin (CRESTOR) 5 MG tablet Take 1 tablet (5 mg total) by mouth once daily. 90 tablet 3     No facility-administered encounter medications on file as of 5/7/2024.       Allergies:  Review of patient's allergies indicates:   Allergen Reactions    Codeine     Pcn [penicillins]        Health Maintenance:  Immunization History   Administered Date(s) Administered    COVID-19 Vaccine 01/31/2021    COVID-19, mRNA, LNP-S, bivalent booster, PF (Moderna Omicron)12 + YEARS 10/14/2022, 10/14/2022    Influenza - High Dose - PF (65 years and older) 09/25/2015, 09/02/2016    Influenza - Quadrivalent 10/08/2020    Influenza - Quadrivalent - High Dose - PF (65 years and older) 09/29/2021, 10/14/2022    Pneumococcal Conjugate - 13 Valent 10/31/2019, 11/07/2019    Pneumococcal Conjugate - 20 Valent 05/07/2024    Zoster 06/19/2015      Health Maintenance   Topic Date Due    TETANUS VACCINE  Never done    Shingles Vaccine (2 of 3) 08/14/2015    Lipid Panel  08/11/2028          Objective:      Vital Signs  Pulse: (!) 56  SpO2: 97 %  BP: 136/70  Pain Score:   6  Pain Loc: Neck  Height and Weight  Height: 5' 2" (157.5 cm)  Weight: 64 kg (141 lb)  BSA (Calculated - sq m): 1.67 sq meters  BMI (Calculated): 25.8  Weight in (lb) to have BMI = 25: 136.4]    Physical Exam  Constitutional:       General: She is not in acute distress.     Appearance: She is well-developed. She is not diaphoretic.   HENT:      Head: Normocephalic and atraumatic.      Right Ear: Tympanic membrane, ear canal and external ear normal. No drainage. Tympanic membrane is not " scarred or retracted.      Left Ear: Tympanic membrane, ear canal and external ear normal. No drainage. Tympanic membrane is not scarred or retracted.      Nose: Mucosal edema present.      Right Sinus: No maxillary sinus tenderness or frontal sinus tenderness.      Left Sinus: No maxillary sinus tenderness or frontal sinus tenderness.      Mouth/Throat:      Mouth: Mucous membranes are moist.      Pharynx: No oropharyngeal exudate, posterior oropharyngeal erythema or uvula swelling.      Tonsils: No tonsillar exudate.   Eyes:      General:         Right eye: No discharge.         Left eye: No discharge.      Conjunctiva/sclera: Conjunctivae normal.   Cardiovascular:      Rate and Rhythm: Normal rate and regular rhythm.   Pulmonary:      Effort: Pulmonary effort is normal. No respiratory distress.      Breath sounds: Normal breath sounds. No stridor. No wheezing or rales.   Chest:      Chest wall: No tenderness.   Musculoskeletal:      Cervical back: Normal range of motion and neck supple.   Lymphadenopathy:      Head:      Right side of head: No submandibular, preauricular or posterior auricular adenopathy.      Left side of head: No submandibular, preauricular or posterior auricular adenopathy.      Cervical: No cervical adenopathy.   Skin:     General: Skin is warm and dry.   Psychiatric:         Behavior: Behavior normal.        Lab Results   Component Value Date    WBC 6.86 12/29/2023    HGB 15.1 12/29/2023    HCT 42.2 12/29/2023     12/29/2023    ALT 11 12/29/2023    AST 20 12/29/2023     12/29/2023    K 4.2 12/29/2023     12/29/2023    CREATININE 0.8 12/29/2023    BUN 13 12/29/2023    CO2 31 (H) 12/29/2023    TSH 0.436 12/29/2023     Assessment/plan:     Sonja Weeks is a 89 y.o.female with:    Cervicalgia of gookyoyp-nroacgi-qqhsz region  Comments:  Recommend over-the-counter lidocaine derm patches plus or minus arthritis strength Tylenol.  Reach out to order open MRIs of head and  neck if pain gets worse  Orders:  -     Ambulatory referral/consult to Physical/Occupational Therapy; Future; Expected date: 05/14/2024    Post-nasal drip- with cough - residual from URI 3 weeks ago  Comments:  Would continue Robitussin DM and Flonase twice daily for another 7-10 days until cough is resolved.  Could continue Flonase all your around as well    Benign essential HTN  Comments:  Well-controlled on low-dose amlodipine with HCTZ 25 mg.    Osteoporosis without current pathological fracture, unspecified osteoporosis type  Comments:  On by yearly Prolia    Other orders  -     (In Office Administered) Pneumococcal Conjugate Vaccine (20 Valent) (IM) (Preferred)        Future Appointments   Date Time Provider Department Center   8/12/2024  2:40 PM Nayeli Clement OD [facility]   8/19/2024 10:30 AM NURSE, [facility] joanie   8/19/2024 11:00 AM Cat Ogden MD [facility]   8/29/2024  2:00 PM INJECTION [facility]       Cat Ogden MD  [facility]

## 2024-05-13 ENCOUNTER — CLINICAL SUPPORT (OUTPATIENT)
Dept: REHABILITATION | Facility: OTHER | Age: 89
End: 2024-05-13
Payer: MEDICARE

## 2024-05-13 DIAGNOSIS — M62.89 MUSCLE TIGHTNESS: Primary | ICD-10-CM

## 2024-05-13 DIAGNOSIS — M54.2 CERVICALGIA OF OCCIPITO-ATLANTO-AXIAL REGION: ICD-10-CM

## 2024-05-13 DIAGNOSIS — M54.2 NECK PAIN ON LEFT SIDE: ICD-10-CM

## 2024-05-13 PROCEDURE — 97112 NEUROMUSCULAR REEDUCATION: CPT | Mod: PN

## 2024-05-13 PROCEDURE — 97530 THERAPEUTIC ACTIVITIES: CPT | Mod: PN

## 2024-05-13 PROCEDURE — 97161 PT EVAL LOW COMPLEX 20 MIN: CPT | Mod: PN

## 2024-05-16 PROBLEM — M54.2 NECK PAIN ON LEFT SIDE: Status: ACTIVE | Noted: 2024-05-16

## 2024-05-16 PROBLEM — M62.89 MUSCLE TIGHTNESS: Status: ACTIVE | Noted: 2024-05-16

## 2024-05-20 NOTE — PLAN OF CARE
"[facility]   Physical Therapy Initial Evaluation      Name: Sonja Weeks  Clinic Number: [id number]    Therapy Diagnosis:   Encounter Diagnoses   Name Primary?    Cervicalgia of xyctmrhk-mfctlba-syekq region     Muscle tightness Yes    Neck pain on left side         Physician: Cat Oconnell MD    Physician Orders: PT Eval and Treat   Medical Diagnosis from Referral: M54.2 (ICD-10-CM) - Cervicalgia of fhbzkwch-ujbarzs-peixq region  Evaluation Date: 5/13/2024  Authorization Period Expiration: 05/07/2025  Plan of Care Expiration: 8/13/2024  Progress Note Due: 6/13/2024  Date of Surgery: n/a  Visit # / Visits authorized: 1/ 1   FOTO: 1/ 3    Precautions: standard, cancer, and HTN, scoliosis, osteoporosis, R TKA, left knee arthroscopy, left reverse shoulder replacement     Time In: 12:15pm  Time Out: 1:00pm  Total Billable Time: 45 minutes    Subjective     Date of onset: chronic    History of current condition - Sonja reports: Chronic neck pain L>R that started ~2 years ago and reports continued pain since then. Has completed 3 mo. Of PT at [facility] as of February 2024. Says that they did a lot of dry needling and exercise (machine based); "the pain is still there but it's almost gone." Turning her head is difficult, has to turn with her body.     Says that the needling was helpful but that her pain came back after it stopped. Not good at keeping up with her exercises on her own. Her PT at [facility] left and she doesn't know why she stopped going there. But she is here today for continued PT for left neck pain. Requests dry needling. Denies continuing her HEP given by previous PT. Does do exercise classes 2x/week provided by [facility].    Denies HAs, radiating pain. Pt denies drop foot, change of B/B control, saddle paresthesias, unexplained weight gain/loss, fever, chills or night sweats.     Falls: none    Imaging: bone scan films, [facility], 2023: FINDINGS:  There is osteopenia.  " "There is grade 1 anterolisthesis of C7 on T1.  Alignment is otherwise unremarkable.  There are multilevel degenerative changes.  There is disc height loss at C6-C7.  There is no acute fracture or subluxation.  The vertebral body heights are maintained.  Prevertebral soft tissues are within normal limits.  Partially visualized left shoulder arthroplasty changes noted.     Impression: No acute osseous abnormality of the cervical spine.    Prior Therapy: yes - 11/23 to 2/24 OPPT for same dx at [facility]; 12/22 to 3/23 at [facility] for chronic LBP, balance deficits  Social History: apartment, assisted living, lives alone at [facility]  Occupation: retired  Recreation: Sees Travis Mccormick at [facility] for exercises classes, 30' at a time, with resistance.  Prior Level of Function: indep  Current Level of Function: pain and difficulty with     Pain:  Current 6/10, worst 6/10, best 5/10   Location: left neck (ear to base of neck), head  Description: Aching, Dull, and Tight - "a little uncomfortable"  Aggravating Factors: turning head in all directions, reaching OH  Easing Factors: heating pads, biofreeze, occas tylenol (only prn), general exercise at [facility] w/ . 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    Medications:   Sonja has a current medication list which includes the following prescription(s): amlodipine, cholecalciferol (vitamin d3), hydrochlorothiazide, levothyroxine, and rosuvastatin.    Allergies:   Review of " "patient's allergies indicates:   Allergen Reactions    Codeine     Pcn [penicillins]         Objective      Posture Alignment: severe thoracic kyphosis, with increased cervical hyperextension  DTR's: intact  Dermatomes: Sensation: Light Touch: Intact    CERVICAL SPINE AROM:   Flexion: Min felipe, chin <1" to chest, pain on L neck   Extension: WNL, pulling in L neck   Left Sidebend: Max felipe, c/o stiffness   Right Sidebend: Max felipe, c/o pain on L neck   Left Rotation: <30 deg, no pain   Right Rotation: <30 deg, pain in L neck     Deep Neck Flexor: poor  Shoulder ROM: WFL all directions  Elbow ROM: WFL all directions      UPPER EXTREMITY STRENGTH:   Left Right   Shoulder Flexion 5/5 5/5   Shoulder Abduction 5/5 5/5   Shoulder Internal Rotation 5/5 5/5   Shoulder External Rotation 5/5 5/5   Upper trap Fair - painful  good   Middle trap poor poor   Lower trap poor poor   rhomboids Fair- Fair-   Serratus anterior Fair- Fair-     Flexibility:  Right Left   Pectoralis Major Mod felipe B  Pectoralis Minor NT NT  Latissimus Dorsi NT NT  Subscapularis  NT NT  Upper Traps  Mod felipe B  Levator Scap  Mod felipe B  Scalenes  Max felipe L with recreation of c/c    Joint Mobility:    C0-2: hypo   C2-7 DS/US: min hypo   T1-10 PAs: max hypo   1st rib INF glides: hypo L    Special Tests:    Clonus (--)  Vertebral artery test (--)  Alar ligament integrity test (--)        Treatment     Total Treatment time (time-based codes) separate from Evaluation: 39 minutes     Sonja received the treatments listed below:      therapeutic exercises to develop strength, endurance, ROM, flexibility, posture, and core stabilization for 00 minutes including:  None today    manual therapy techniques: Joint mobilizations, Manual traction, Myofacial release, and Soft tissue Mobilization were applied to the: CSP/R ribs for 8 minutes, including:  TSP PA glides, first rib inf glides, manual S of UT/LS/katina (L)  Pt interested in dry needling, but as she has already " "received >10 wks of DN at outside of [facility] clinic without complete relief. Try to avoid DN if possible, and opt for IASTM/cupping instead, but consider DN to ONLY scalenes (L) if pt adamant abt receiving DN.    neuromuscular re-education activities to improve: Balance, Coordination, Kinesthetic, Sense, Proprioception, and Posture for 8 minutes. The following activities were included:  Self scalene stretch x 3 x 20" - heavy VC/TC/visual cuing for technique as pt presents with fair return technique and poor recall after demonstration in clinic.  Progress scalene flexibility, first rib mobility, general postural strengthening nv    therapeutic activities to improve functional performance for 23  minutes, including:  Pt edu on dx, pathogenesis, prognosis, PT POC, including what this episode of PT can offer vs what was covered over the most recent episode of PT (x3 weeks) with clinic outside of [facility]. Heavy edu on POC including focus on scalene flexibility, indications for dry needling.  Pt edu on HEP, including scalene stretch (3x20"), to reduce pain and improve overall mobility.        Patient Education and Home Exercises     Education provided:   - Patient educated regarding surgical procedure, pathogenesis, diagnosis, protocol, prognosis, POC, and HEP, including use of visual assistance for understanding of anatomy and dysfunction. Written Home Exercises Provided with written and verbal instructions for frequency and duration of the following exercises: scalene stretch. Pt educated on HEP and activity modifications to reduce c/o pain and improve overall function.   -Patient received education regarding proper posture and body mechanics. Linsey roll tried, recommended, and purchase information was provided. Heavy emphasis on upright posture while sitting to improve spinal alignment, restore lumbar curvature, and reduce stress/strain on cervical spine. Continued to recommend pt perform HEP intermittently " throughout the day to reduce c/o stiffness and pain, particularly cervical retractions while sitting in car with TTC from headrest. Pt also educated on use of modalities prn to reduce c/o pain and dysfunction. Sonja demo good understanding of the education provided. Sonja  demonstrated good return demonstration of activities.   - Pt also educated on use of modalities prn to reduce c/o pain and dysfunction.   - Pt educated on clinic's cancellation/no-show policy of missing 3 consecutive PT appointments, which will result in an automatic discharge from therapy services 2* to non-compliance, unless otherwise stated.   - Patient demo good understanding of the education provided. Patient demo good return demo of skill of exercises.      Written Home Exercises Provided: yes. Exercises were reviewed and Sonja was able to demonstrate them prior to the end of the session.  Sonja demonstrated good  understanding of the education provided. See EMR under Patient Instructions for exercises provided during therapy sessions.    Assessment     Sonja is a 89 y.o. female referred to outpatient Physical Therapy with a medical diagnosis of M54.2 (ICD-10-CM) - Cervicalgia of gjqmtfsl-oewvoki-tpwki region. Patient presents with marked limitations in ROM, joint and myofascial mobility, flexibility, strength, postural awareness/endurance, motor control and coordination. S/s associated with referring diagnosis, with scalene flexibility dysfunction and first rib stiffness contributing to residual c/o pain on L neck. Impairments limit pt with all functional activities including turning head in all directions.      Patient prognosis is Fair.   Patient will benefit from skilled outpatient Physical Therapy to address the deficits stated above and in the chart below, provide patient /family education, and to maximize patientt's level of independence.     Plan of care discussed with patient: Yes  Patient's spiritual, cultural and  educational needs considered and patient is agreeable to the plan of care and goals as stated below:     Anticipated Barriers for therapy: standard, relies on transportation; recently completed 3 mo of PT for same dx at another OPPT clinic    Medical Necessity is demonstrated by the following  History  Co-morbidities and personal factors that may impact the plan of care [x] LOW: no personal factors / co-morbidities  [] MODERATE: 1-2 personal factors / co-morbidities  [] HIGH: 3+ personal factors / co-morbidities    Moderate / High Support Documentation:   Co-morbidities affecting plan of care: chronicity of sx    Personal Factors:   age  transportation     Examination  Body Structures and Functions, activity limitations and participation restrictions that may impact the plan of care [] LOW: addressing 1-2 elements  [] MODERATE: 3+ elements  [x] HIGH: 4+ elements (please support below)    Moderate / High Support Documentation: joint mobility, flexibility, myofascial mobility, postural strength, endurance, turning head with ADLs and self care activities     Clinical Presentation [x] LOW: stable  [] MODERATE: Evolving  [] HIGH: Unstable     Decision Making/ Complexity Score: low       Goals:  Short Term Goals (3 Weeks):   1. Pt will report 20% reduction in pain of the cervical spine for ease with ADL's (not met, progressing)  2. Pt will demonstrate improved cervical spine ROM in all directions by 5 degrees for ease with driving to MD appointments (not met, progressing)  Long Term Goals (6 Weeks):   1. Pt will report being independent with HEP for maintenance of improvements gained during therapy sessions (not met, progressing)  2. PT will report 50% reduction of pain of the neck for ease with donning upper body clothing  (not met, progressing)  3. PT will demonstrate 1/3 MMT improvement in periscapular strength for ease with upright posture (not met, progressing)  4. Pt will demonstrate appropriate upright posture  without external cueing for ease with work related activities. (Not met, progressing)    Plan     Plan of care Certification: 5/13/2024 to 6/24/2024.    Outpatient Physical Therapy 2 times weekly for 6 weeks to include the following interventions: Aquatic Therapy, Cervical/Lumbar Traction, Electrical Stimulation prn, Gait Training, Iontophoresis (with dexamethasone prn), Manual Therapy, Moist Heat/ Ice, Neuromuscular Re-ed, Patient Education, Self Care, Therapeutic Activities, and Therapeutic Exercise. Progress HEP towards D/C. Recommend F/U with MD if symptoms worsen or do not resolve. Patient may be seen by a PTA for treatment to carry out their plan of care.  Face-to-face conferences will be held.     Jing Menchaca, PT        Physician's Signature: _________________________________________ Date: ________________

## 2024-06-04 ENCOUNTER — CLINICAL SUPPORT (OUTPATIENT)
Dept: REHABILITATION | Facility: OTHER | Age: 89
End: 2024-06-04
Payer: MEDICARE

## 2024-06-04 DIAGNOSIS — M62.89 MUSCLE TIGHTNESS: Primary | ICD-10-CM

## 2024-06-04 DIAGNOSIS — M54.2 NECK PAIN ON LEFT SIDE: ICD-10-CM

## 2024-06-04 PROCEDURE — 97530 THERAPEUTIC ACTIVITIES: CPT | Mod: PN | Performed by: PHYSICAL THERAPIST

## 2024-06-04 PROCEDURE — 97112 NEUROMUSCULAR REEDUCATION: CPT | Mod: PN | Performed by: PHYSICAL THERAPIST

## 2024-06-04 PROCEDURE — 97140 MANUAL THERAPY 1/> REGIONS: CPT | Mod: PN | Performed by: PHYSICAL THERAPIST

## 2024-06-04 NOTE — PROGRESS NOTES
"[facility]   Physical Therapy Treatment Note      Name: Sonja Weeks  Clinic Number: [id number]    Therapy Diagnosis:   Encounter Diagnoses   Name Primary?    Muscle tightness Yes    Neck pain on left side      Physician: Cat Oconnell MD    Visit Date: 6/4/2024    Physician Orders: PT Eval and Treat   Medical Diagnosis from Referral: M54.2 (ICD-10-CM) - Cervicalgia of jpcvoyzy-yulhmnm-yxekl region  Evaluation Date: 5/13/2024  Authorization Period Expiration: 05/07/2025  Plan of Care Expiration: 8/13/2024  Progress Note Due: 6/13/2024  Date of Surgery: n/a  Visit # / Visits authorized: 2/ 11   FOTO: 1/ 3     Precautions: standard, cancer, and HTN, scoliosis, osteoporosis, R TKA, left knee arthroscopy, left reverse shoulder replacement      Time In: 1045  Time Out: 1130  Total Billable Time: 45 minutes    PTA Visit #: 0/5       Subjective     Patient reports: "I'm going to be 89, so I'm just trying to keep myself together." She says pain isn't terrible.   She was not compliant with home exercise program.  Response to previous treatment: no change with evaluation  Functional change: no change    Pain: 5/10  Location: left neck (ear to base of neck), head    Objective      Objective Measures updated at progress report unless specified.     Treatment     Sonja received the bolded treatments listed below:      therapeutic exercises to develop strength, endurance, ROM, flexibility, posture, and core stabilization for 2 minutes including:  Scalene/SMC stretch 5 x 10" (felt more with looking down than up)    manual therapy techniques: Joint mobilizations, Manual traction, Myofacial release, Soft tissue Mobilization, and Friction Massage were applied to the: neck for 15 minutes, including:    IASTM to L lateral neck (scalene, SCM)  Manual cross friction massage to L lateral neck, sub occipital release    Pt interested in dry needling, but as she has already received >10 wks of DN at " "outside of [facility] clinic without complete relief. Try to avoid DN if possible, and opt for IASTM/cupping instead, but consider DN to ONLY scalemaria antonia (L) if pt adamant abt receiving DN.      neuromuscular re-education activities to improve: Balance, Coordination, Kinesthetic, Sense, Proprioception, and Posture for 20 minutes. The following activities were included:  Supine chin tucks 5" x 20 min  Open books 10 x 10"  Supine pec stretch over towel roll x 2 min  Supine scapular retraction over towel roll x20      therapeutic activities to improve functional performance for 8  minutes, including:  Pt education including review of HEP (open books, scalene stretch)      Patient Education and Home Exercises       Education provided:   - therex rationale, review of HEP    Written Home Exercises Provided: yes. Exercises were reviewed and Sonja was able to demonstrate them prior to the end of the session.  Sonja demonstrated good  understanding of the education provided. See Electronic Medical Record under Patient Instructions for exercises provided during therapy sessions    Assessment     Review of HEP, reinforced the importance of regular performance. Pt again educated on limitations of dry needling, instead trial of IASTM and manual release with report of relief today. Heavy cuing for technique with all therex. Poor ability to count duration and repetitions, may benefit from use of timer with exercises more in the future.     Sonja Is progressing well towards her goals.   Patient prognosis is Fair.     Patient will continue to benefit from skilled outpatient physical therapy to address the deficits listed in the problem list box on initial evaluation, provide pt/family education and to maximize pt's level of independence in the home and community environment.     Patient's spiritual, cultural and educational needs considered and pt agreeable to plan of care and goals.     Anticipated barriers to physical therapy: " standard, relies on transportation; recently completed 3 mo of PT for same dx at another [facility]PP[facility] clinic    Goals: updated 06/04/2024  Short Term Goals (3 Weeks):   1. Pt will report 20% reduction in pain of the cervical spine for ease with ADL's (not met, progressing)  2. Pt will demonstrate improved cervical spine ROM in all directions by 5 degrees for ease with driving to MD appointments (not met, progressing)    Long Term Goals (6 Weeks):   1. Pt will report being independent with HEP for maintenance of improvements gained during therapy sessions (not met, progressing)  2. PT will report 50% reduction of pain of the neck for ease with donning upper body clothing  (not met, progressing)  3. PT will demonstrate 1/3 MMT improvement in periscapular strength for ease with upright posture (not met, progressing)  4. Pt will demonstrate appropriate upright posture without external cueing for ease with work related activities. (Not met, progressing)    Plan   Plan of care Certification: 5/13/2024 to 6/24/2024.    Continue per POC with focus on cervical ROM and postural stability.     Krista Vigil, PT

## 2024-06-06 ENCOUNTER — CLINICAL SUPPORT (OUTPATIENT)
Dept: REHABILITATION | Facility: OTHER | Age: 89
End: 2024-06-06
Payer: MEDICARE

## 2024-06-06 DIAGNOSIS — M62.89 MUSCLE TIGHTNESS: Primary | ICD-10-CM

## 2024-06-06 DIAGNOSIS — M54.2 NECK PAIN ON LEFT SIDE: ICD-10-CM

## 2024-06-06 PROCEDURE — 97140 MANUAL THERAPY 1/> REGIONS: CPT | Mod: PN

## 2024-06-06 PROCEDURE — 97530 THERAPEUTIC ACTIVITIES: CPT | Mod: PN

## 2024-06-06 PROCEDURE — 97112 NEUROMUSCULAR REEDUCATION: CPT | Mod: PN

## 2024-06-06 NOTE — PROGRESS NOTES
"[facility]   Physical Therapy Treatment Note      Name: Sonja Weeks  Clinic Number: [id number]    Therapy Diagnosis:   Encounter Diagnoses   Name Primary?    Muscle tightness Yes    Neck pain on left side      Physician: Cat Oconnell MD    Visit Date: 6/6/2024    Physician Orders: PT Eval and Treat   Medical Diagnosis from Referral: M54.2 (ICD-10-CM) - Cervicalgia of fanlvbxe-emmbxum-whrub region  Evaluation Date: 5/13/2024  Authorization Period Expiration: 05/07/2025  Plan of Care Expiration: 8/13/2024  Progress Note Due: 6/13/2024  Date of Surgery: n/a  Visit # / Visits authorized: 2/ 11   FOTO: 1/ 3     Precautions: standard, cancer, and HTN, scoliosis, osteoporosis, R TKA, left knee arthroscopy, left reverse shoulder replacement      Time In: 11:15  Time Out: 12:03  Total Billable Time: 48 minutes    PTA Visit #: 0/5       Subjective     Patient reports: "I'm going to be 89, so I'm just trying to keep myself together." She says pain isn't terrible.   She was not compliant with home exercise program.  Response to previous treatment: no change with evaluation  Functional change: no change    Pain: 5/10  Location: left neck (ear to base of neck), head    Objective      Objective Measures updated at progress report unless specified.     Treatment     Sonja received the bolded treatments listed below:      therapeutic exercises to develop strength, endurance, ROM, flexibility, posture, and core stabilization for 2 minutes including:  Scalene/SMC stretch 5 x 10" (felt more with looking down than up)    manual therapy techniques: Joint mobilizations, Manual traction, Myofacial release, Soft tissue Mobilization, and Friction Massage were applied to the: neck for 18 minutes, including:  IASTM to L lateral neck (scalene, SCM, UT)  Manual cross friction massage to L lateral neck, sub occipital release  First rib inf glides w/ and w/o manual scalene stretch  C2-7 DS/US, T1-4 PA " "buddy    Pt interested in dry needling, but as she has already received >10 wks of DN at outside of [facility] clinic without complete relief. Try to avoid DN if possible, and opt for IASTM/cupping instead, but consider DN to ONLY scalenes (L) if pt adamant abt receiving DN.      neuromuscular re-education activities to improve: Balance, Coordination, Kinesthetic, Sense, Proprioception, and Posture for 20 minutes. The following activities were included:  Supine chin tucks 5" x 20  Open books 10 x 10" - defer today, resume nv?  Supine pec stretch over towel roll x 2 min  Supine scapular retraction over towel roll x20  +supine Morgan ER + scap retraction x 1x15 x RTB  +supine Morgan abd + scap retraction x 1x15 x RTB    therapeutic activities to improve functional performance for 8  minutes, including:  Pt education including review of HEP (open books, scalene stretch)  +seated narrow rows x2x10 x black TB      Patient Education and Home Exercises       Education provided:   - therex rationale, review of HEP    Written Home Exercises Provided: yes. Exercises were reviewed and Sonja was able to demonstrate them prior to the end of the session.  Sonja demonstrated good  understanding of the education provided. See Electronic Medical Record under Patient Instructions for exercises provided during therapy sessions    Assessment     Progressed scapular strengthening today to promote postural endurance, motor control/coordination for cervicothoracic stabilization. Good tolerance with MPT today and added exercises, with only intermittent VC/TC for scapular placement.    Sonja Is progressing well towards her goals.   Patient prognosis is Fair.     Patient will continue to benefit from skilled outpatient physical therapy to address the deficits listed in the problem list box on initial evaluation, provide pt/family education and to maximize pt's level of independence in the home and community environment.     Patient's " spiritual, cultural and educational needs considered and pt agreeable to plan of care and goals.     Anticipated barriers to physical therapy: standard, relies on transportation; recently completed 3 mo of PT for same dx at another [facility]PP[facility] clinic    Goals: updated 06/06/2024  Short Term Goals (3 Weeks):   1. Pt will report 20% reduction in pain of the cervical spine for ease with ADL's (not met, progressing)  2. Pt will demonstrate improved cervical spine ROM in all directions by 5 degrees for ease with driving to MD appointments (not met, progressing)    Long Term Goals (6 Weeks):   1. Pt will report being independent with HEP for maintenance of improvements gained during therapy sessions (not met, progressing)  2. PT will report 50% reduction of pain of the neck for ease with donning upper body clothing  (not met, progressing)  3. PT will demonstrate 1/3 MMT improvement in periscapular strength for ease with upright posture (not met, progressing)  4. Pt will demonstrate appropriate upright posture without external cueing for ease with work related activities. (Not met, progressing)    Plan   Plan of care Certification: 5/13/2024 to 6/24/2024.    Continue per POC with focus on cervical ROM and postural stability.     Jnig Menchaca, PT

## 2024-06-11 ENCOUNTER — CLINICAL SUPPORT (OUTPATIENT)
Dept: REHABILITATION | Facility: OTHER | Age: 89
End: 2024-06-11
Payer: MEDICARE

## 2024-06-11 DIAGNOSIS — M54.2 NECK PAIN ON LEFT SIDE: ICD-10-CM

## 2024-06-11 DIAGNOSIS — M62.89 MUSCLE TIGHTNESS: Primary | ICD-10-CM

## 2024-06-11 PROCEDURE — 97530 THERAPEUTIC ACTIVITIES: CPT | Mod: PN

## 2024-06-11 PROCEDURE — 97112 NEUROMUSCULAR REEDUCATION: CPT | Mod: PN

## 2024-06-11 PROCEDURE — 97140 MANUAL THERAPY 1/> REGIONS: CPT | Mod: PN

## 2024-06-11 NOTE — PROGRESS NOTES
"[facility]   Physical Therapy Treatment Note      Name: Sonja Weeks  Clinic Number: [id number]    Therapy Diagnosis:   Encounter Diagnoses   Name Primary?    Muscle tightness Yes    Neck pain on left side      Physician: Cat Oconnell MD    Visit Date: 6/11/2024    Physician Orders: PT Eval and Treat   Medical Diagnosis from Referral: M54.2 (ICD-10-CM) - Cervicalgia of olgoefuy-swxxrwv-tihuc region  Evaluation Date: 5/13/2024  Authorization Period Expiration: 05/07/2025  Plan of Care Expiration: 6/24/2024  Progress Note Due: 6/13/2024  Date of Surgery: n/a  Visit # / Visits authorized: 4/ 11   FOTO: 1/ 3     Precautions: standard, cancer, and HTN, scoliosis, osteoporosis, R TKA, left knee arthroscopy, left reverse shoulder replacement      Time In: 11:12  Time Out: 12:05  Total Billable Time: 53 minutes    PTA Visit #: 0/5       Subjective     Patient reports: having more mid-back pain (L>R) in addition to continued L neck pain. "I'm doing those exercise classes at [facility] but I dont think they're doing anything."  She was not compliant with home exercise program.  Response to previous treatment: no change with evaluation  Functional change: no change    Pain: 7/10  Location: left neck (ear to base of neck), head, L middle back    Objective      Objective Measures updated at progress report unless specified.     Treatment     Sonja received the bolded treatments listed below:      therapeutic exercises to develop strength, endurance, ROM, flexibility, posture, and core stabilization for 00 minutes including:  Scalene/SMC stretch 5 x 10" (felt more with looking down than up)    manual therapy techniques: Joint mobilizations, Manual traction, Myofacial release, Soft tissue Mobilization, and Friction Massage were applied to the: neck for 15 minutes, including:  IASTM to L lateral neck (scalene, SCM, UT)  Manual cross friction massage to L lateral neck, sub occipital " "release  First rib inf glides w/ and w/o manual scalene stretch  C2-7 DS/US, T1-4 PA glides  +c/r to promote UT/scalene flexibility, MFR    Pt interested in dry needling, but as she has already received >10 wks of DN at outside of [facility] without complete relief. Try to avoid DN if possible, and opt for IASTM/cupping instead, but consider DN to ONLY scalenes (L) if pt adamant abt receiving DN.      neuromuscular re-education activities to improve: Balance, Coordination, Kinesthetic, Sense, Proprioception, and Posture for 23 minutes. The following activities were included:  Supine chin tucks 5" x 2 min - towel roll under cervical kyphosis today  Open books 10 x 10" - defer today, resume nv?  Supine over towel roll  pec stretch over towel roll x 2 min  +Dowel chest press into SA punch x2x10 x 2#  supine Morgan ER + scap retraction x 2x10 x RTB  supine Morgan abd + scap retraction x 2x10 x RTB    therapeutic activities to improve functional performance for 15 minutes, including:  seated narrow rows x2x10 x black TB  +seated SALPD x 2x10 x red TB - modified ROM to promote scap retraction/positioning  +pt edu on benefits, progression to [facility] for chronic neck and back pain due to chronicity of sx, learning how to manage a chronic condition      Patient Education and Home Exercises       Education provided:   - therex rationale, review of HEP    Written Home Exercises Provided: yes. Exercises were reviewed and Sonja was able to demonstrate them prior to the end of the session.  Sonja demonstrated good  understanding of the education provided. See Electronic Medical Record under Patient Instructions for exercises provided during therapy sessions    Assessment     Progressed scapular strength and postural stabilization with serratus punches in supine and seated straight arm lat pulls. Heavy VC/TC for scap placement and upright posture for appropriate mm activation. Pt educated on Healthy back program as " pt notes worsening back pain overall since beginning this episode of PT. Written information provided for pt to give to family to consider transfer of care.    Sonja Is progressing well towards her goals.   Patient prognosis is Fair.     Patient will continue to benefit from skilled outpatient physical therapy to address the deficits listed in the problem list box on initial evaluation, provide pt/family education and to maximize pt's level of independence in the home and community environment.     Patient's spiritual, cultural and educational needs considered and pt agreeable to plan of care and goals.     Anticipated barriers to physical therapy: standard, relies on transportation; recently completed 3 mo of PT for same dx at another [facility] clinic    Goals: updated 06/11/2024  Short Term Goals (3 Weeks):   1. Pt will report 20% reduction in pain of the cervical spine for ease with ADL's (not met, progressing)  2. Pt will demonstrate improved cervical spine ROM in all directions by 5 degrees for ease with driving to MD appointments (not met, progressing)    Long Term Goals (6 Weeks):   1. Pt will report being independent with HEP for maintenance of improvements gained during therapy sessions (not met, progressing)  2. PT will report 50% reduction of pain of the neck for ease with donning upper body clothing  (not met, progressing)  3. PT will demonstrate 1/3 MMT improvement in periscapular strength for ease with upright posture (not met, progressing)  4. Pt will demonstrate appropriate upright posture without external cueing for ease with work related activities. (Not met, progressing)    Plan   Plan of care Certification: 5/13/2024 to 6/24/2024.    Continue per POC with focus on cervical ROM and postural stability.     Jing Menchaca, PT

## 2024-06-18 ENCOUNTER — TELEPHONE (OUTPATIENT)
Dept: INTERNAL MEDICINE | Facility: CLINIC | Age: 89
End: 2024-06-18
Payer: MEDICARE

## 2024-07-02 ENCOUNTER — TELEPHONE (OUTPATIENT)
Dept: INTERNAL MEDICINE | Facility: CLINIC | Age: 89
End: 2024-07-02
Payer: MEDICARE

## 2024-07-22 NOTE — PROGRESS NOTES
89 y.o. female patient is here for suture removal following Mohs' surgery.    Patient reports no problems.    WOUND PE:  The left forehead sutures intact. Wound healing well. Good skin edges. No signs or symptoms of infection. Mild blanchable peripheral erythema c/w postop inflammation.      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    PLAN:  Sutures removed today by  Elsi Waldron RN . Steri-strips applied.  Continue wound care.  Keep moist with Aquaphor.  Call if any issues arise    RTC:  In 3-6 months with Jessie Price MD for skin check or sooner if new concern arises.    
Previously negative

## 2024-08-09 DIAGNOSIS — I10 BENIGN ESSENTIAL HTN: ICD-10-CM

## 2024-08-09 DIAGNOSIS — R73.9 HYPERGLYCEMIA: Primary | ICD-10-CM

## 2024-08-09 DIAGNOSIS — E03.8 HYPOTHYROIDISM DUE TO HASHIMOTO'S THYROIDITIS: ICD-10-CM

## 2024-08-09 DIAGNOSIS — E78.01 HYPERLIPIDEMIA TYPE II: ICD-10-CM

## 2024-08-09 DIAGNOSIS — R53.81 OTHER MALAISE: ICD-10-CM

## 2024-08-09 DIAGNOSIS — E06.3 HYPOTHYROIDISM DUE TO HASHIMOTO'S THYROIDITIS: ICD-10-CM

## 2024-08-09 RX ORDER — POLYETHYLENE GLYCOL 3350 17 G/17G
17 POWDER, FOR SOLUTION ORAL DAILY PRN
Qty: 30 EACH | Refills: 3 | Status: SHIPPED | OUTPATIENT
Start: 2024-08-09

## 2024-08-12 ENCOUNTER — OFFICE VISIT (OUTPATIENT)
Dept: OPTOMETRY | Facility: CLINIC | Age: 89
End: 2024-08-12
Payer: MEDICARE

## 2024-08-12 DIAGNOSIS — Z96.1 PSEUDOPHAKIA OF BOTH EYES: ICD-10-CM

## 2024-08-12 PROCEDURE — 99212 OFFICE O/P EST SF 10 MIN: CPT | Mod: PBBFAC | Performed by: OPTOMETRIST

## 2024-08-12 PROCEDURE — 99999 PR PBB SHADOW E&M-EST. PATIENT-LVL II: CPT | Mod: PBBFAC,,, | Performed by: OPTOMETRIST

## 2024-08-12 PROCEDURE — 92004 COMPRE OPH EXAM NEW PT 1/>: CPT | Mod: S$PBB,,, | Performed by: OPTOMETRIST

## 2024-08-12 RX ORDER — SERTRALINE HYDROCHLORIDE 25 MG/1
25 TABLET, FILM COATED ORAL
COMMUNITY
Start: 2024-08-02

## 2024-08-12 RX ORDER — DICLOFENAC SODIUM 10 MG/G
GEL TOPICAL
COMMUNITY
Start: 2024-08-01

## 2024-08-12 RX ORDER — VALSARTAN 40 MG/1
40 TABLET ORAL
COMMUNITY
Start: 2024-08-02

## 2024-08-13 NOTE — PROGRESS NOTES
HPI    Pt is here today for routine eye exam. Patient denies pain/discomfort.   Patient suffered a stroke on June 16.   DLS: 1 and a half years ago  (-)Flashes   (-)Floaters   (-)Diplopia   (-)Headaches   (+)Itching: OD  (+)Tearing: OD  (+)Burning: OD  (+)Dryness: OS>OD  (-)Photophobia  (-)Glare   (-)Blurred VA  Past Eye Sx: Cataract OU   Eye Meds: (-)   Last edited by Nayeli Clement, OD on 8/12/2024  3:22 PM.            Assessment /Plan     For exam results, see Encounter Report.    Pseudophakia of both eyes  -     Ambulatory referral/consult to Optometry      Monitor; pt educated on condition and visual status.    RTC in 1 year for annual eye exam unless changes noted sooner.

## 2024-08-15 RX ORDER — LEVOTHYROXINE SODIUM 112 UG/1
TABLET ORAL
Qty: 90 TABLET | Refills: 1 | Status: SHIPPED | OUTPATIENT
Start: 2024-08-15

## 2024-08-15 NOTE — TELEPHONE ENCOUNTER
No care due was identified.  [facility] Embedded Care Due Messages. Reference number: [id number].   8/15/2024 10:35:22 AM CDT

## 2024-08-15 NOTE — TELEPHONE ENCOUNTER
Refill Routing Note   Medication(s) are not appropriate for processing by [facility] for the following reason(s):        No active prescription written by provider  Other: pharmacy change    [facility] action(s):  Defer               Appointments  past 12m or future 3m with PCP    Date Provider   Last Visit   5/7/2024 Cat Oconnell MD   Next Visit   Visit date not found Cat Oconnell MD   ED visits in past 90 days: 0        Note composed:6:10 PM 08/15/2024

## 2024-08-16 RX ORDER — AZELASTINE 1 MG/ML
1 SPRAY, METERED NASAL 2 TIMES DAILY
Qty: 30 ML | Refills: 0 | Status: SHIPPED | OUTPATIENT
Start: 2024-08-16 | End: 2025-08-16

## 2024-08-16 RX ORDER — AMLODIPINE BESYLATE 2.5 MG/1
2.5 TABLET ORAL
Qty: 90 TABLET | Refills: 2 | Status: SHIPPED | OUTPATIENT
Start: 2024-08-16

## 2024-08-16 RX ORDER — DEXTROMETHORPHAN HBR AND GUAIFENESIN 5; 100 MG/5ML; MG/5ML
10 LIQUID ORAL 3 TIMES DAILY
Qty: 237 ML | Refills: 0 | Status: SHIPPED | OUTPATIENT
Start: 2024-08-16 | End: 2024-08-23

## 2024-08-16 RX ORDER — AZITHROMYCIN 250 MG/1
TABLET, FILM COATED ORAL
Qty: 6 TABLET | Refills: 0 | Status: SHIPPED | OUTPATIENT
Start: 2024-08-16 | End: 2024-08-21

## 2024-08-16 NOTE — TELEPHONE ENCOUNTER
No care due was identified.  [facility] Embedded Care Due Messages. Reference number: [id number].   8/16/2024 8:19:22 AM CDT

## 2024-08-16 NOTE — TELEPHONE ENCOUNTER
Refill Routing Note   Medication(s) are not appropriate for processing by [facility] for the following reason(s):      Required labs outdated    [facility] action(s):  Defer Care Due:  None identified            Appointments  past 12m or future 3m with PCP    Date Provider   Last Visit   5/7/2024 Cat Oconnell MD   Next Visit   Visit date not found Cat Oconnell MD   ED visits in past 90 days: 0        Note composed:6:22 PM 08/16/2024

## 2024-08-18 RX ORDER — ROSUVASTATIN CALCIUM 5 MG/1
5 TABLET, COATED ORAL
Qty: 90 TABLET | Refills: 0 | Status: SHIPPED | OUTPATIENT
Start: 2024-08-18

## 2024-08-23 ENCOUNTER — TELEPHONE (OUTPATIENT)
Dept: INTERNAL MEDICINE | Facility: CLINIC | Age: 89
End: 2024-08-23
Payer: MEDICARE

## 2024-08-26 NOTE — TELEPHONE ENCOUNTER
Care Due:                  Date            Visit Type   Department     Provider  --------------------------------------------------------------------------------                                             [facility]   INTERNAL  Last Visit: 05-      PATIENT      MEDICINE       Cat Oconnell  Next Visit: None Scheduled  None         None Found                                                            Last  Test          Frequency    Reason                     Performed    Due Date  --------------------------------------------------------------------------------    Lipid Panel.  12 months..  rosuvastatin.............  08- 08-    Health [facility] Embedded Care Due Messages. Reference number: [id number].   8/26/2024 8:44:32 AM CDT

## 2024-08-27 RX ORDER — DICLOFENAC SODIUM 10 MG/G
GEL TOPICAL
Qty: 400 G | Refills: 0 | Status: SHIPPED | OUTPATIENT
Start: 2024-08-27

## 2024-08-27 RX ORDER — SERTRALINE HYDROCHLORIDE 25 MG/1
25 TABLET, FILM COATED ORAL
Qty: 90 TABLET | Refills: 2 | Status: SHIPPED | OUTPATIENT
Start: 2024-08-27

## 2024-08-27 RX ORDER — AZELASTINE 1 MG/ML
1 SPRAY, METERED NASAL 2 TIMES DAILY
Qty: 60 ML | Refills: 2 | Status: SHIPPED | OUTPATIENT
Start: 2024-08-27

## 2024-08-27 RX ORDER — VALSARTAN 40 MG/1
40 TABLET ORAL
Qty: 90 TABLET | Refills: 2 | Status: SHIPPED | OUTPATIENT
Start: 2024-08-27

## 2024-08-27 RX ORDER — AZELASTINE 1 MG/ML
1 SPRAY, METERED NASAL 2 TIMES DAILY
Qty: 60 ML | Refills: 2 | Status: SHIPPED | OUTPATIENT
Start: 2024-08-27 | End: 2024-08-27 | Stop reason: SDUPTHER

## 2024-08-27 NOTE — TELEPHONE ENCOUNTER
Refill Routing Note   Medication(s) are not appropriate for processing by [facility] for the following reason(s):        No active prescription written by provider    [facility] action(s):  Defer   Requires labs : Yes             Appointments  past 12m or future 3m with PCP    Date Provider   Last Visit   5/7/2024 Cat Oconnell MD   Next Visit   Visit date not found Cat Oconnell MD   ED visits in past 90 days: 0        Note composed:5:31 AM 08/27/2024

## 2024-08-27 NOTE — TELEPHONE ENCOUNTER
Refill Routing Note   Medication(s) are not appropriate for processing by [facility] for the following reason(s):        New or recently adjusted medication: Astelin  Outside of protocol: Voltaren    [facility] action(s):  Defer  Route  Quick Discontinue    Approved Astelin in error. Discontinued previous order & pended for provider's review.         Appointments  past 12m or future 3m with PCP    Date Provider   Last Visit   5/7/2024 Cat Oconnell MD   Next Visit   Visit date not found Cat Oconnell MD   ED visits in past 90 days: 0        Note composed:1:13 PM 08/27/2024

## 2024-08-27 NOTE — TELEPHONE ENCOUNTER
No care due was identified.  [facility] Embedded Care Due Messages. Reference number: [id number].   8/27/2024 10:36:43 AM CDT

## 2024-08-29 ENCOUNTER — INFUSION (OUTPATIENT)
Dept: INFECTIOUS DISEASES | Facility: HOSPITAL | Age: 89
End: 2024-08-29
Attending: INTERNAL MEDICINE
Payer: MEDICARE

## 2024-08-29 VITALS
DIASTOLIC BLOOD PRESSURE: 66 MMHG | HEIGHT: 62 IN | WEIGHT: 143.44 LBS | RESPIRATION RATE: 20 BRPM | TEMPERATURE: 98 F | BODY MASS INDEX: 26.39 KG/M2 | OXYGEN SATURATION: 94 % | SYSTOLIC BLOOD PRESSURE: 144 MMHG | HEART RATE: 57 BPM

## 2024-08-29 DIAGNOSIS — M81.0 AGE-RELATED OSTEOPOROSIS WITHOUT CURRENT PATHOLOGICAL FRACTURE: Primary | ICD-10-CM

## 2024-08-29 PROCEDURE — 96372 THER/PROPH/DIAG INJ SC/IM: CPT

## 2024-08-29 PROCEDURE — 63600175 PHARM REV CODE 636 W HCPCS: Mod: JZ,JG | Performed by: INTERNAL MEDICINE

## 2024-08-29 RX ADMIN — DENOSUMAB 60 MG: 60 INJECTION SUBCUTANEOUS at 02:08

## 2024-08-30 ENCOUNTER — LAB VISIT (OUTPATIENT)
Dept: LAB | Facility: HOSPITAL | Age: 89
End: 2024-08-30
Payer: MEDICARE

## 2024-08-30 ENCOUNTER — OFFICE VISIT (OUTPATIENT)
Dept: INTERNAL MEDICINE | Facility: CLINIC | Age: 89
End: 2024-08-30
Payer: MEDICARE

## 2024-08-30 DIAGNOSIS — B37.2 CANDIDAL DERMATITIS: ICD-10-CM

## 2024-08-30 DIAGNOSIS — L57.0 ACTINIC KERATOSES: ICD-10-CM

## 2024-08-30 DIAGNOSIS — E03.8 HYPOTHYROIDISM DUE TO HASHIMOTO'S THYROIDITIS: ICD-10-CM

## 2024-08-30 DIAGNOSIS — I10 BENIGN ESSENTIAL HTN: ICD-10-CM

## 2024-08-30 DIAGNOSIS — E78.5 HYPERLIPIDEMIA LDL GOAL <100: ICD-10-CM

## 2024-08-30 DIAGNOSIS — G89.29 CHRONIC PAIN OF LEFT KNEE: Primary | ICD-10-CM

## 2024-08-30 DIAGNOSIS — R73.9 HYPERGLYCEMIA: ICD-10-CM

## 2024-08-30 DIAGNOSIS — M70.51 PES ANSERINUS BURSITIS OF RIGHT KNEE: ICD-10-CM

## 2024-08-30 DIAGNOSIS — E06.3 HYPOTHYROIDISM DUE TO HASHIMOTO'S THYROIDITIS: ICD-10-CM

## 2024-08-30 DIAGNOSIS — M25.562 CHRONIC PAIN OF LEFT KNEE: Primary | ICD-10-CM

## 2024-08-30 DIAGNOSIS — R53.81 OTHER MALAISE: ICD-10-CM

## 2024-08-30 LAB
ALBUMIN SERPL BCP-MCNC: 3.7 G/DL (ref 3.5–5.2)
ALP SERPL-CCNC: 56 U/L (ref 55–135)
ALT SERPL W/O P-5'-P-CCNC: 12 U/L (ref 10–44)
ANION GAP SERPL CALC-SCNC: 10 MMOL/L (ref 8–16)
AST SERPL-CCNC: 19 U/L (ref 10–40)
BASOPHILS # BLD AUTO: 0.07 K/UL (ref 0–0.2)
BASOPHILS NFR BLD: 1 % (ref 0–1.9)
BILIRUB SERPL-MCNC: 0.7 MG/DL (ref 0.1–1)
BUN SERPL-MCNC: 22 MG/DL (ref 10–30)
CALCIUM SERPL-MCNC: 9.8 MG/DL (ref 8.7–10.5)
CHLORIDE SERPL-SCNC: 102 MMOL/L (ref 95–110)
CO2 SERPL-SCNC: 31 MMOL/L (ref 23–29)
CREAT SERPL-MCNC: 1.1 MG/DL (ref 0.5–1.4)
DIFFERENTIAL METHOD BLD: ABNORMAL
EOSINOPHIL # BLD AUTO: 0.2 K/UL (ref 0–0.5)
EOSINOPHIL NFR BLD: 3.4 % (ref 0–8)
ERYTHROCYTE [DISTWIDTH] IN BLOOD BY AUTOMATED COUNT: 14.5 % (ref 11.5–14.5)
EST. GFR  (NO RACE VARIABLE): 46.6 ML/MIN/1.73 M^2
ESTIMATED AVG GLUCOSE: 97 MG/DL (ref 68–131)
GLUCOSE SERPL-MCNC: 110 MG/DL (ref 70–110)
HBA1C MFR BLD: 5 % (ref 4–5.6)
HCT VFR BLD AUTO: 43.1 % (ref 37–48.5)
HGB BLD-MCNC: 14.5 G/DL (ref 12–16)
IMM GRANULOCYTES # BLD AUTO: 0.01 K/UL (ref 0–0.04)
IMM GRANULOCYTES NFR BLD AUTO: 0.1 % (ref 0–0.5)
LYMPHOCYTES # BLD AUTO: 2.9 K/UL (ref 1–4.8)
LYMPHOCYTES NFR BLD: 43.1 % (ref 18–48)
MCH RBC QN AUTO: 32.4 PG (ref 27–31)
MCHC RBC AUTO-ENTMCNC: 33.6 G/DL (ref 32–36)
MCV RBC AUTO: 96 FL (ref 82–98)
MONOCYTES # BLD AUTO: 0.7 K/UL (ref 0.3–1)
MONOCYTES NFR BLD: 10.3 % (ref 4–15)
NEUTROPHILS # BLD AUTO: 2.9 K/UL (ref 1.8–7.7)
NEUTROPHILS NFR BLD: 42.1 % (ref 38–73)
NRBC BLD-RTO: 0 /100 WBC
PLATELET # BLD AUTO: 225 K/UL (ref 150–450)
PMV BLD AUTO: 10.7 FL (ref 9.2–12.9)
POTASSIUM SERPL-SCNC: 4.3 MMOL/L (ref 3.5–5.1)
PROT SERPL-MCNC: 7.2 G/DL (ref 6–8.4)
RBC # BLD AUTO: 4.48 M/UL (ref 4–5.4)
SODIUM SERPL-SCNC: 143 MMOL/L (ref 136–145)
TSH SERPL DL<=0.005 MIU/L-ACNC: 0.77 UIU/ML (ref 0.4–4)
WBC # BLD AUTO: 6.78 K/UL (ref 3.9–12.7)

## 2024-08-30 PROCEDURE — 99213 OFFICE O/P EST LOW 20 MIN: CPT | Mod: PBBFAC | Performed by: INTERNAL MEDICINE

## 2024-08-30 PROCEDURE — 84443 ASSAY THYROID STIM HORMONE: CPT | Performed by: INTERNAL MEDICINE

## 2024-08-30 PROCEDURE — 83036 HEMOGLOBIN GLYCOSYLATED A1C: CPT | Performed by: INTERNAL MEDICINE

## 2024-08-30 PROCEDURE — 80053 COMPREHEN METABOLIC PANEL: CPT | Performed by: INTERNAL MEDICINE

## 2024-08-30 PROCEDURE — 99999 PR PBB SHADOW E&M-EST. PATIENT-LVL III: CPT | Mod: PBBFAC,,, | Performed by: INTERNAL MEDICINE

## 2024-08-30 PROCEDURE — 85025 COMPLETE CBC W/AUTO DIFF WBC: CPT | Performed by: INTERNAL MEDICINE

## 2024-08-30 PROCEDURE — 99214 OFFICE O/P EST MOD 30 MIN: CPT | Mod: S$PBB,,, | Performed by: INTERNAL MEDICINE

## 2024-08-30 PROCEDURE — 36415 COLL VENOUS BLD VENIPUNCTURE: CPT | Performed by: INTERNAL MEDICINE

## 2024-08-30 RX ORDER — MICONAZOLE NITRATE 2 G/100G
POWDER TOPICAL 2 TIMES DAILY
Qty: 71 G | Refills: 0 | Status: SHIPPED | OUTPATIENT
Start: 2024-08-30 | End: 2024-09-13

## 2024-08-30 RX ORDER — NYSTATIN 100000 U/G
CREAM TOPICAL 2 TIMES DAILY
Qty: 60 G | Refills: 0 | Status: SHIPPED | OUTPATIENT
Start: 2024-08-30 | End: 2024-09-13

## 2024-09-02 VITALS
HEIGHT: 62 IN | WEIGHT: 141 LBS | DIASTOLIC BLOOD PRESSURE: 60 MMHG | BODY MASS INDEX: 25.95 KG/M2 | SYSTOLIC BLOOD PRESSURE: 110 MMHG | OXYGEN SATURATION: 98 % | HEART RATE: 60 BPM

## 2024-09-02 NOTE — PROGRESS NOTES
Subjective:       Patient ID: Sonja Weeks is a 89 y.o. 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  A month and a half ago while she was visiting relatives in [place] she had a stroke that originally had some expressive aphasia and at present has left her with some increased memory deficits.  She generally went to skilled at [facility] in a few weeks ago she went into assisted living.  I saw her when she was at skilled unit in her blood pressure was mildly elevated so we added valsartan 40 mg to her blood pressure regimen which includes HCTZ 25 mg daily and amlodipine 2.5 mg daily as well.  We also added low-dose sertraline in preparation for the transition from pre stroke independent living to assisted living.  She is still playing a little bit of bridge.  She is making some good friends in his sleeping well.  She is seeing a psychiatrist Dr. Tennille Jorge who comes out to [facility].  Physically her only complaint today is of lateral right knee pain that has been bothering her for the past couple of weeks and not all the time.  Today with a walker.    Review of Systems   Constitutional:  Negative for chills, fever and weight loss.   HENT:  Negative for sore throat.    Eyes:  Negative for blurred vision and double vision.   Respiratory:  Negative for cough and shortness of breath.    Cardiovascular:  Negative for chest pain and palpitations.   Gastrointestinal:  Negative for constipation, diarrhea, nausea and vomiting.   Genitourinary:  Negative for dysuria and hematuria.   Musculoskeletal:  Positive for joint pain. Negative for myalgias.   Skin:  Negative for itching and rash.    Neurological:  Negative for sensory change, focal weakness and headaches.   Endo/Heme/Allergies:  Does not bruise/bleed easily.   Psychiatric/Behavioral:  Negative for depression and suicidal ideas.         Medications:  Outpatient Encounter Medications as of 2024   Medication Sig Dispense Refill    amLODIPine (NORVASC) 2.5 MG tablet TAKE 1 TAB BY MOUTH EVERY DAY 90 tablet 2    azelastine (ASTELIN) 137 mcg (0.1 %) nasal spray 1 spray (137 mcg total) by Nasal route 2 (two) times daily. 60 mL 2    cholecalciferol, vitamin D3, (VITAMIN D3) 25 mcg (1,000 unit) capsule Take 1,000 Units by mouth once daily.      diclofenac sodium (VOLTAREN) 1 % Gel APPLY TO LEFT KNEE FOUR TIMES DAILY AS NEEDED 400 g 0    hydroCHLOROthiazide (HYDRODIURIL) 25 MG tablet TAKE 1 TABLET BY MOUTH ONCE  DAILY 90 tablet 3    levothyroxine (SYNTHROID) 112 MCG tablet TAKE 1 TAB BY MOUTH EVERY MORNING 90 tablet 1    miconazole NITRATE 2 % (MICOTIN) 2 % top powder Apply topically 2 (two) times a day. On top of nystatin cream for 14 days 71 g 0    nystatin (MYCOSTATIN) cream Apply topically 2 (two) times daily. for 14 days 60 g 0    polyethylene glycol (GLYCOLAX) 17 gram PwPk Take 17 g by mouth daily as needed. 30 each 3    rosuvastatin (CRESTOR) 5 MG tablet TAKE 1 TAB BY MOUTH EVERY DAY 90 tablet 0    sertraline (ZOLOFT) 25 MG tablet TAKE 1 TAB BY MOUTH EVERY DAY 90 tablet 2    valsartan (DIOVAN) 40 MG tablet TAKE 1 TAB BY MOUTH EVERY DAY 90 tablet 2    [] denosumab (PROLIA) injection 60 mg        No facility-administered encounter medications on file as of 2024.       Allergies:  Review of patient's allergies indicates:   Allergen Reactions    Codeine Hallucinations    Dexamethasone Other (See Comments)    Pcn [penicillins]        Health Maintenance:  Immunization History   Administered Date(s) Administered    COVID-19 Vaccine 2021    COVID-19, mRNA, LNP-S, bivalent booster, PF (Moderna Omicron)12 + YEARS 10/14/2022,  "10/14/2022    Influenza - High Dose - PF (65 years and older) 09/25/2015, 09/02/2016    Influenza - Quadrivalent 10/08/2020    Influenza - Quadrivalent - High Dose - PF (65 years and older) 09/29/2021, 10/14/2022    Pneumococcal Conjugate - 13 Valent 10/31/2019, 11/07/2019    Pneumococcal Conjugate - 20 Valent 05/07/2024    Zoster 06/19/2015      Health Maintenance   Topic Date Due    Shingles Vaccine (2 of 3) 08/14/2015    TETANUS VACCINE  11/30/2026    Lipid Panel  06/18/2029          Objective:      Vital Signs  Pulse: 60  SpO2: 98 %  BP: 110/60  Pain Score:   2  Pain Loc: Knee (left knee)  Height and Weight  Height: 5' 2" (157.5 cm)  Weight: 64 kg (141 lb)  BSA (Calculated - sq m): 1.67 sq meters  BMI (Calculated): 25.8  Weight in (lb) to have BMI = 25: 136.4]    Physical Exam  HENT:      Head: Normocephalic and atraumatic.   Eyes:      General: No scleral icterus.  Cardiovascular:      Rate and Rhythm: Normal rate and regular rhythm.      Heart sounds: No murmur heard.  Pulmonary:      Effort: Pulmonary effort is normal.      Breath sounds: Normal breath sounds. No rales.   Abdominal:      General: Bowel sounds are normal.      Palpations: Abdomen is soft.      Tenderness: There is no abdominal tenderness.   Musculoskeletal:      Cervical back: Neck supple.      Right knee: No bony tenderness. Tenderness present over the lateral joint line.      Left knee: Normal.   Neurological:      Mental Status: She is alert and oriented to person, place, and time.   Psychiatric:         Mood and Affect: Mood normal.         Behavior: Behavior normal.         Thought Content: Thought content normal.         Judgment: Judgment normal.          Assessment/plan:     Sonja Weeks is a 89 y.o.female with:    Chronic pain of left knee  -     Ambulatory referral/consult to Sports Medicine; Future; Expected date: 09/06/2024   Patient defers medication such as over-the-counter analgesics predating knee pain.    Pes anserinus " bursitis of right knee ?  -     Ambulatory referral/consult to Sports Medicine; Future; Expected date: 09/06/2024    Candidal dermatitis-?  -     nystatin (MYCOSTATIN) cream; Apply topically 2 (two) times daily. for 14 days  Dispense: 60 g; Refill: 0  -     miconazole NITRATE 2 % (MICOTIN) 2 % top powder; Apply topically 2 (two) times a day. On top of nystatin cream for 14 days  Dispense: 71 g; Refill: 0   If no improvement does have a dermatology appointment in the making    Benign essential HTN   Continue amlodipine 2.5 mg, hydrochlorothiazide 25 mg as well as valsartan 40 mg daily for now    Hyperlipidemia LDL goal <100   Continue with rosuvastatin 5 mg daily.    Actinic keratoses possible squamous cell carcinoma of the skin on her lower extremity.   Patient's daughter will make a skin check appointment with Dr. Jessie Price    Future Appointments   Date Time Provider Department Center   9/10/2024  2:00 PM Patrick Catherine MD [facility] SPORTS [place]   3/3/2025  2:15 PM INJECTION [facility]       Cat Ogden MD  [facility]

## 2024-09-03 ENCOUNTER — TELEPHONE (OUTPATIENT)
Dept: INTERNAL MEDICINE | Facility: CLINIC | Age: 89
End: 2024-09-03
Payer: MEDICARE

## 2024-09-03 DIAGNOSIS — R94.4 DECREASED GFR: Primary | ICD-10-CM

## 2024-09-06 ENCOUNTER — TELEPHONE (OUTPATIENT)
Dept: SPORTS MEDICINE | Facility: CLINIC | Age: 89
End: 2024-09-06
Payer: MEDICARE

## 2024-09-06 NOTE — TELEPHONE ENCOUNTER
----- Message from Humaira Espino sent at 9/6/2024  4:08 PM CDT -----  Type:  Patient Returning Call    Who Called: pt's daughter  Who Left Message for Patient: caller said Monica  Does the patient know what this is regarding?: yes  Would the patient rather a call back or a response via [facility]? call  Best Call Back Number: [phone]  Additional Information:

## 2024-09-12 ENCOUNTER — TELEPHONE (OUTPATIENT)
Dept: SPORTS MEDICINE | Facility: CLINIC | Age: 89
End: 2024-09-12
Payer: MEDICARE

## 2024-09-12 DIAGNOSIS — M25.561 BILATERAL KNEE PAIN: Primary | ICD-10-CM

## 2024-09-12 DIAGNOSIS — M25.562 BILATERAL KNEE PAIN: Primary | ICD-10-CM

## 2024-09-12 NOTE — TELEPHONE ENCOUNTER
Called patient to inform them to arrive 15 mins early to complete x-rays prior to appointment on 9/16. Left VM.    Sarah May MS, OTC  Clinical Assistant to Dr. Galina Zuniga

## 2024-09-16 ENCOUNTER — HOSPITAL ENCOUNTER (OUTPATIENT)
Dept: RADIOLOGY | Facility: HOSPITAL | Age: 89
Discharge: HOME | End: 2024-09-16
Attending: STUDENT IN AN ORGANIZED HEALTH CARE EDUCATION/TRAINING PROGRAM
Payer: MEDICARE

## 2024-09-16 ENCOUNTER — OFFICE VISIT (OUTPATIENT)
Dept: SPORTS MEDICINE | Facility: CLINIC | Age: 89
End: 2024-09-16
Payer: MEDICARE

## 2024-09-16 VITALS — HEART RATE: 55 BPM | SYSTOLIC BLOOD PRESSURE: 129 MMHG | DIASTOLIC BLOOD PRESSURE: 71 MMHG

## 2024-09-16 DIAGNOSIS — M25.562 CHRONIC PAIN OF BOTH KNEES: ICD-10-CM

## 2024-09-16 DIAGNOSIS — G89.29 CHRONIC PAIN OF BOTH KNEES: ICD-10-CM

## 2024-09-16 DIAGNOSIS — M25.562 BILATERAL KNEE PAIN: ICD-10-CM

## 2024-09-16 DIAGNOSIS — M25.562 CHRONIC PAIN OF LEFT KNEE: Primary | ICD-10-CM

## 2024-09-16 DIAGNOSIS — M70.51 PES ANSERINUS BURSITIS OF RIGHT KNEE: ICD-10-CM

## 2024-09-16 DIAGNOSIS — G89.29 CHRONIC PAIN OF LEFT KNEE: Primary | ICD-10-CM

## 2024-09-16 DIAGNOSIS — M25.561 CHRONIC PAIN OF BOTH KNEES: ICD-10-CM

## 2024-09-16 DIAGNOSIS — M17.12 PRIMARY OSTEOARTHRITIS OF LEFT KNEE: ICD-10-CM

## 2024-09-16 DIAGNOSIS — Z98.890 HISTORY OF RIGHT KNEE SURGERY: ICD-10-CM

## 2024-09-16 DIAGNOSIS — M25.561 BILATERAL KNEE PAIN: ICD-10-CM

## 2024-09-16 PROCEDURE — 73564 X-RAY EXAM KNEE 4 OR MORE: CPT | Mod: 26,50,, | Performed by: RADIOLOGY

## 2024-09-16 PROCEDURE — 99999PBSHW PR PBB SHADOW TECHNICAL ONLY FILED TO HB: Mod: PBBFAC,,,

## 2024-09-16 PROCEDURE — 99213 OFFICE O/P EST LOW 20 MIN: CPT | Mod: PBBFAC,25,PO | Performed by: STUDENT IN AN ORGANIZED HEALTH CARE EDUCATION/TRAINING PROGRAM

## 2024-09-16 PROCEDURE — 20611 DRAIN/INJ JOINT/BURSA W/US: CPT | Mod: PBBFAC,PO,LT | Performed by: STUDENT IN AN ORGANIZED HEALTH CARE EDUCATION/TRAINING PROGRAM

## 2024-09-16 PROCEDURE — 99999 PR PBB SHADOW E&M-EST. PATIENT-LVL III: CPT | Mod: PBBFAC,,, | Performed by: STUDENT IN AN ORGANIZED HEALTH CARE EDUCATION/TRAINING PROGRAM

## 2024-09-16 PROCEDURE — 99214 OFFICE O/P EST MOD 30 MIN: CPT | Mod: 25,S$PBB,, | Performed by: STUDENT IN AN ORGANIZED HEALTH CARE EDUCATION/TRAINING PROGRAM

## 2024-09-16 PROCEDURE — 73564 X-RAY EXAM KNEE 4 OR MORE: CPT | Mod: TC,50,PO

## 2024-09-16 RX ORDER — TRIAMCINOLONE ACETONIDE 40 MG/ML
40 INJECTION, SUSPENSION INTRA-ARTICULAR; INTRAMUSCULAR
Status: DISCONTINUED | OUTPATIENT
Start: 2024-09-16 | End: 2024-09-16 | Stop reason: HOSPADM

## 2024-09-16 RX ADMIN — TRIAMCINOLONE ACETONIDE 40 MG: 40 INJECTION, SUSPENSION INTRA-ARTICULAR; INTRAMUSCULAR at 02:09

## 2024-09-16 NOTE — PROGRESS NOTES
"CC: bilateral knee pain    89 y.o. Female presents today for evaluation of her bilateral knee pain. Pt's daughter notes prev hx of arthroscopy (meniscus) in L knee and partial knee replacement in R knee. Pt reports gradual onset of knee pain for years. Pt localizes pain to anterior knees bilaterally and lateral knee on the left. Pt reports pain is 6/10 in the left and 4/10 in the right today. Pt denies mechanical symptoms. Pt denies numbness/tingling.     SYMPTOMS:   Pain Score:6/10 left, 4/10 right   Pain location: anterior (bilaterally), lateral (left)  Time of onset: "years"  Trauma, injury: gradual onset    Audible pop: no  Clicking: no  Catching: no  Locking: no  Giving out, instability: no  Swelling: no   Theater sign: no   Problems with stairs: some, states she hangs onto railing    INTERVENTIONS:   Medications tried: voltaren gel, biofreeze, advil 200mg PRN (has relief)  Braces/devices: cane  Physical therapy: none since post-op  Injections: CSI in R knee (in [place], had 3 months or more of relief, last one was over 2 yrs ago)    RELEVANT HISTORY:   Imaging to date: 9/16/24  Previous significant knee injuries: L meniscus tear  Previous knee surgeries: L arthroscopy (meniscus) ~15 yrs ago, R partial TKA ~17 yrs ago    Occupation: retired     REVIEW OF SYSTEMS:   Constitution: Patient denies fever or chills.  Eyes: Patient denies eye pain or vision changes.  HEENT: Patient denies ear pain, sore throat, or nasal discharge.  CVS: Patient denies chest pain.  Lungs: Patient denies shortness of breath or cough.  Abdomen: Patient denies any stomach pain, nausea, vomiting, or diarrhea  Skin: Patient reports skin rash.   Musculoskeletal: Patient denies recent injuries or trauma.  Neuro: Patient denies any numbness or tingling in upper or lower extremities.  Psych: Patient denies any current anxiety or nervousness.    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 fracture too    TOTAL KNEE ARTHROPLASTY Right     many years - partial       FAMILY HISTORY:  Family History   Problem Relation Name Age of Onset    Heart disease Mother 86     Heart disease Father 86     Cancer Brother 66         brain       SOCIAL HISTORY:  Social History     Socioeconomic History    Marital status:    Tobacco Use    Smoking status: Former    Smokeless tobacco: Never   Substance and Sexual Activity    Alcohol use: Yes    Drug use: Never    Sexual activity: Not Currently     Social Determinants of Health     Food Insecurity: Low Risk  (6/16/2024)    Received from [employer]    Hunger Vital Sign     Worried About Running Out of Food in the Last Year: Never true     Ran Out of Food in the Last Year: Never true   Housing Stability: Low Risk  (6/16/2024)    Received from [employer] Stability Vital Sign     What is your living situation today?: I have a steady place to live     Think about the place you live. Do you have problems with any of the following? Choose all that apply:: None/None on this list       MEDICATIONS:     Current Outpatient Medications:     amLODIPine (NORVASC) 2.5 MG tablet, TAKE 1 TAB BY MOUTH EVERY DAY, Disp: 90 tablet, Rfl: 2    azelastine (ASTELIN) 137 mcg (0.1 %) nasal spray, 1 spray (137 mcg total) by Nasal route 2 (two) times daily., Disp: 60 mL, Rfl: 2    cholecalciferol, vitamin D3, (VITAMIN D3) 25 mcg (1,000 unit) capsule, Take 1,000 Units by mouth once daily., Disp: , Rfl:     diclofenac sodium (VOLTAREN) 1 % Gel, APPLY TO LEFT KNEE FOUR TIMES DAILY AS NEEDED, Disp: 400 g, Rfl: 0     levothyroxine (SYNTHROID) 112 MCG tablet, TAKE 1 TAB BY MOUTH EVERY MORNING, Disp: 90 tablet, Rfl: 1    polyethylene glycol (GLYCOLAX) 17 gram PwPk, Take 17 g by mouth daily as needed., Disp: 30 each, Rfl: 3    rosuvastatin (CRESTOR) 5 MG tablet, TAKE 1 TAB BY MOUTH EVERY DAY, Disp: 90 tablet, Rfl: 0    sertraline (ZOLOFT) 25 MG tablet, TAKE 1 TAB BY MOUTH EVERY DAY, Disp: 90 tablet, Rfl: 2    valsartan (DIOVAN) 40 MG tablet, TAKE 1 TAB BY MOUTH EVERY DAY, Disp: 90 tablet, Rfl: 2    miconazole NITRATE 2 % (MICOTIN) 2 % top powder, Apply topically 2 (two) times a day. On top of nystatin cream for 14 days, Disp: 71 g, Rfl: 0    nystatin (MYCOSTATIN) cream, Apply topically 2 (two) times daily. for 14 days, Disp: 60 g, Rfl: 0    ALLERGIES:   Review of patient's allergies indicates:   Allergen Reactions    Codeine Hallucinations    Dexamethasone Other (See Comments)    Pcn [penicillins]         PHYSICAL EXAMINATION:  /71   Pulse (!) 55   LMP  (LMP Unknown)   Vitals signs and nursing note have been reviewed.    General: In no acute distress, well developed, well nourished, no diaphoresis  Eyes: EOM full and smooth, no eye redness or discharge  HEENT: normocephalic and atraumatic, neck supple, trachea midline, no nasal discharge  Cardiovascular: no LE edema  Lungs: respirations non-labored, no conversational dyspnea   Neuro: AAOx3, CN2-12 grossly intact  Skin: No rashes, warm and dry  Psychiatric: cooperative, pleasant, mood and affect appropriate for age    Bilateral Knee:   Gait: antalgic with stupor    Inspection/Palpation:   -Rubor   -Calor  -Effusion   -Patella ballotable   -Patellar apprehension  -Retinacular tenderness   -Patellar crepitus   Patellar tilt grossly normal     TTP at:  -Joint line   -MCL   -LCL   -Popliteal region   -Quad tendon   -Patella  -Pat tendon  -Pat border  -Med condyle   -Lat condyle   -Pes   -Prox fibula   -Tib tub  -Gerdy's tubercle  -Distal Hamstring tendons  -Proximal  Hamstrings/Ischial tuberosity  -ITB    ROM:   Ext: 0°   Flex:130°   Popliteal Angle: 40°   +Discomfort w/ full flex on left   +Bounce-home discomfort on left    Ligamentous:   -Ant drawer   -Post drawer   -Lachman's   Good endpoints & no pain w/ valgus & varus stress    Meniscal:  -Rosa's   -Kori   -Thessaly   -Pain w/ squat     Other:  -Patellar apprehension  -Patellar grind  -Turner's   -J sign  -Gretta's  Abductors     IMAGIN. Knee X-ray ordered due to bilateral knee pain. 5 views taken today.   2. X-ray images were reviewed personally by me and then directly with patient.  3. FINDINGS:  Mild Back is deformity, no signs of acute fracture or dislocation, partial (lateral) arthroplasty right knee, moderate to advanced degenerative changes, soft tissues unremarkable.    4. IMPRESSION:  Partial arthroplasty right knee, Kellgren Geovanni grade 3-4 osteoarthritic changes left knee.  No acute osseous abnormalities appreciated.    ASSESSMENT:      ICD-10-CM ICD-9-CM   1. Chronic pain of left knee  M25.562 719.46    G89.29 338.29   2. Primary osteoarthritis of left knee  M17.12 715.16   3. History of right knee surgery  Z98.890 V15.29   4. Pes anserinus bursitis of right knee ?  M70.51 726.61         PLAN:  Based on patient history, physical exam findings, and imaging I believe the patient's main pain generator to the left knee is her primary osteoarthritis.  We will move forward with a corticosteroid injection to the left knee for pain relief.  Patient's right knee is not that bothersome.  Patient not interested in surgery.    Risks and benefits were discussed with patient prior to receiving injection.  Depending on injection type, risks include the possibility of infection, pain, disruptions in blood pressure and blood sugar, and cosmetic deformity at site of injection.      Future planning includes - consider hyaluronic acid injection if no improvement.    All questions were answered to the best of my  ability and all concerns were addressed at this time.    Follow up in 6 week(s) for above, or sooner if needed.      This note is dictated using the M*Modal Fluency Direct word recognition program. There are word recognition mistakes that are occasionally missed on review.

## 2024-09-16 NOTE — PROCEDURES
Large Joint Aspiration/Injection: L knee    Date/Time: 9/16/2024 2:30 PM    Performed by: Galina Zuniga MD  Authorized by: Galina Zuniga MD    Consent Done?:  Yes (Verbal)  Indications:  Arthritis and pain  Site marked: the procedure site was marked    Timeout: prior to procedure the correct patient, procedure, and site was verified    Prep: patient was prepped and draped in usual sterile fashion      Local anesthesia used?: Yes    Anesthesia:  Local infiltration  Local anesthetic:  Co-phenylcaine spray    Details:  Needle Size:  22 G  Ultrasonic Guidance for needle placement?: Yes (Ultrasound guidance used to avoid neurovascular injury and/or to improve accuracy given body habitus.)    Images are saved and documented.  Approach: Superolateral.  Location:  Knee  Site:  L knee  Medications:  40 mg triamcinolone acetonide 40 mg/mL  Medications comment:  Ropivacaine 0.2% 2mL  Patient tolerance:  Patient tolerated the procedure well with no immediate complications     TECHNIQUE: Real time ultrasound examination of the left knee was performed with SonNanoVascte Edge 2, 9-L MHz linear probe(s). Ultrasound guidance was used for needle localization. Images were saved and stored for documentation. Dynamic visualization of the needle was continuous throughout the procedures and maintained in good position.

## 2024-10-01 ENCOUNTER — CLINICAL SUPPORT (OUTPATIENT)
Dept: INTERNAL MEDICINE | Facility: CLINIC | Age: 89
End: 2024-10-01
Payer: MEDICARE

## 2024-10-01 VITALS — SYSTOLIC BLOOD PRESSURE: 166 MMHG | HEART RATE: 55 BPM | DIASTOLIC BLOOD PRESSURE: 88 MMHG

## 2024-10-01 DIAGNOSIS — R94.4 DECREASED GFR: ICD-10-CM

## 2024-10-01 LAB
ALBUMIN SERPL BCP-MCNC: 3.7 G/DL (ref 3.5–5.2)
ANION GAP SERPL CALC-SCNC: 6 MMOL/L (ref 8–16)
BUN SERPL-MCNC: 14 MG/DL (ref 10–30)
CALCIUM SERPL-MCNC: 9.6 MG/DL (ref 8.7–10.5)
CHLORIDE SERPL-SCNC: 105 MMOL/L (ref 95–110)
CO2 SERPL-SCNC: 29 MMOL/L (ref 23–29)
CREAT SERPL-MCNC: 0.9 MG/DL (ref 0.5–1.4)
EST. GFR  (NO RACE VARIABLE): 59.2 ML/MIN/1.73 M^2
GLUCOSE SERPL-MCNC: 94 MG/DL (ref 70–110)
PHOSPHATE SERPL-MCNC: 2.9 MG/DL (ref 2.7–4.5)
POTASSIUM SERPL-SCNC: 4 MMOL/L (ref 3.5–5.1)
SODIUM SERPL-SCNC: 140 MMOL/L (ref 136–145)

## 2024-10-01 PROCEDURE — 99211 OFF/OP EST MAY X REQ PHY/QHP: CPT | Mod: PBBFAC

## 2024-10-01 PROCEDURE — 99999 PR PBB SHADOW E&M-EST. PATIENT-LVL I: CPT | Mod: PBBFAC,,,

## 2024-10-01 PROCEDURE — 80069 RENAL FUNCTION PANEL: CPT | Performed by: INTERNAL MEDICINE

## 2024-10-08 NOTE — TELEPHONE ENCOUNTER
Refill Routing Note   Medication(s) are not appropriate for processing by [facility] for the following reason(s):        Required labs outdated    [facility] action(s):  Defer     Requires labs : Yes             Appointments  past 12m or future 3m with PCP    Date Provider   Last Visit   8/30/2024 Cat Oconnell MD   Next Visit   Visit date not found Cat Oconnell MD   ED visits in past 90 days: 0        Note composed:6:18 PM 10/08/2024

## 2024-10-08 NOTE — TELEPHONE ENCOUNTER
Care Due:                  Date            Visit Type   Department     Provider  --------------------------------------------------------------------------------                                             [facility]   INTERNAL  Last Visit: 08-      PATIENT      MEDICINE       Cat Oconnell  Next Visit: None Scheduled  None         None Found                                                            Last  Test          Frequency    Reason                     Performed    Due Date  --------------------------------------------------------------------------------    Lipid Panel.  12 months..  rosuvastatin.............  Not Found    Overdue    [employer] Embedded Care Due Messages. Reference number: [id number].   10/08/2024 11:07:37 AM CDT

## 2024-10-09 RX ORDER — ROSUVASTATIN CALCIUM 5 MG/1
5 TABLET, COATED ORAL
Qty: 90 TABLET | Refills: 0 | Status: SHIPPED | OUTPATIENT
Start: 2024-10-09

## 2024-11-04 ENCOUNTER — OFFICE VISIT (OUTPATIENT)
Dept: SPORTS MEDICINE | Facility: CLINIC | Age: 89
End: 2024-11-04
Payer: MEDICARE

## 2024-11-04 VITALS — SYSTOLIC BLOOD PRESSURE: 131 MMHG | DIASTOLIC BLOOD PRESSURE: 70 MMHG

## 2024-11-04 DIAGNOSIS — M17.12 PRIMARY OSTEOARTHRITIS OF LEFT KNEE: ICD-10-CM

## 2024-11-04 DIAGNOSIS — G89.29 CHRONIC PAIN OF LEFT KNEE: Primary | ICD-10-CM

## 2024-11-04 DIAGNOSIS — M25.562 CHRONIC PAIN OF LEFT KNEE: Primary | ICD-10-CM

## 2024-11-04 PROCEDURE — 99214 OFFICE O/P EST MOD 30 MIN: CPT | Mod: S$PBB,,, | Performed by: STUDENT IN AN ORGANIZED HEALTH CARE EDUCATION/TRAINING PROGRAM

## 2024-11-04 PROCEDURE — 99999 PR PBB SHADOW E&M-EST. PATIENT-LVL III: CPT | Mod: PBBFAC,,, | Performed by: STUDENT IN AN ORGANIZED HEALTH CARE EDUCATION/TRAINING PROGRAM

## 2024-11-04 PROCEDURE — 99213 OFFICE O/P EST LOW 20 MIN: CPT | Mod: PBBFAC,PO | Performed by: STUDENT IN AN ORGANIZED HEALTH CARE EDUCATION/TRAINING PROGRAM

## 2024-11-04 NOTE — PROGRESS NOTES
CC: left knee pain    89 y.o. Female presents today for follow up evaluation of her left knee pain following CSI.  Patient reports she feels about the same since prior to the injection, but pain and discomfort is tolerable.  In her words she feels okay.    Attempted treatments: CSI, cane, walker  Pain score: 0/10 rest, 7/10 with walking  History of trauma/injury: none since last visit  Affecting ADLs: prolonged walking      REVIEW OF SYSTEMS:   Constitution: Patient denies fever or chills.  Eyes: Patient denies eye pain or vision changes.  HEENT: Patient denies ear pain, sore throat, or nasal discharge.  CVS: Patient denies chest pain.  Lungs: Patient denies shortness of breath or cough.  Skin: Patient reports rash in panus.  Musculoskeletal: Patient denies recent falls. See HPI.  Psych: Patient denies any current anxiety or nervousness.    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    left forehead    Thyroid disease        MEDICATIONS:     Current Outpatient Medications:     amLODIPine (NORVASC) 2.5 MG tablet, TAKE 1 TAB BY MOUTH EVERY DAY, Disp: 90 tablet, Rfl: 2    azelastine (ASTELIN) 137 mcg (0.1 %) nasal spray, 1 spray (137 mcg total) by Nasal route 2 (two) times daily., Disp: 60 mL, Rfl: 2    cholecalciferol, vitamin D3, (VITAMIN D3) 25 mcg (1,000 unit) capsule, Take 1,000 Units by mouth once daily., Disp: , Rfl:     diclofenac sodium (VOLTAREN) 1 % Gel, APPLY TO LEFT KNEE FOUR TIMES DAILY AS NEEDED, Disp: 400 g, Rfl: 0    levothyroxine (SYNTHROID) 112 MCG tablet, TAKE 1 TAB BY MOUTH EVERY MORNING, Disp: 90 tablet, Rfl: 1    polyethylene glycol (GLYCOLAX) 17 gram PwPk, Take 17 g by mouth daily as needed., Disp: 30 each, Rfl: 3    rosuvastatin (CRESTOR) 5 MG tablet, TAKE 1 TAB BY MOUTH EVERY DAY, Disp: 90 tablet, Rfl: 0    sertraline  (ZOLOFT) 25 MG tablet, TAKE 1 TAB BY MOUTH EVERY DAY, Disp: 90 tablet, Rfl: 2    valsartan (DIOVAN) 40 MG tablet, TAKE 1 TAB BY MOUTH EVERY DAY, Disp: 90 tablet, Rfl: 2    miconazole NITRATE 2 % (MICOTIN) 2 % top powder, Apply topically 2 (two) times a day. On top of nystatin cream for 14 days, Disp: 71 g, Rfl: 0    nystatin (MYCOSTATIN) cream, Apply topically 2 (two) times daily. for 14 days, Disp: 60 g, Rfl: 0    ALLERGIES:   Review of patient's allergies indicates:   Allergen Reactions    Codeine Hallucinations    Dexamethasone Other (See Comments)    Pcn [penicillins]         PHYSICAL EXAMINATION:  /70   Pulse (!) (P) 59   LMP  (LMP Unknown)   Vitals signs and nursing note have been reviewed.    General: In no acute distress, well developed, well nourished, no diaphoresis  Eyes: EOM full and smooth, no eye redness or discharge  HENT: normocephalic and atraumatic, neck supple, trachea midline, no nasal discharge  Cardiovascular: no LE edema  Lungs: respirations non-labored, no conversational dyspnea   Neuro: AAOx3, CN2-12 grossly intact  Skin: No rashes, warm and dry  Psychiatric: cooperative, pleasant, mood and affect appropriate for age    ASSESSMENT:      ICD-10-CM ICD-9-CM   1. Chronic pain of left knee  M25.562 719.46    G89.29 338.29   2. Primary osteoarthritis of left knee  M17.12 715.16         PLAN:  Overall, patient states that the discomfort she has is tolerable even though the injection did not make a significant difference.  Plan will be to try hyaluronic acid injection (Synvisc-One) should pain increase in the future.      All questions were answered to the best of my ability and all concerns were addressed at this time.    Follow up for above.    This note is dictated using the M*Modal Fluency Direct word recognition program. There are word recognition mistakes that are occasionally missed on review.

## 2024-11-05 RX ORDER — NYSTATIN 100000 [USP'U]/G
POWDER TOPICAL
Qty: 30 G | Refills: 11 | Status: SHIPPED | OUTPATIENT
Start: 2024-11-05

## 2025-01-14 DIAGNOSIS — Z00.00 ENCOUNTER FOR MEDICARE ANNUAL WELLNESS EXAM: ICD-10-CM

## 2025-02-07 DIAGNOSIS — R30.0 DYSURIA: Primary | ICD-10-CM

## 2025-02-07 RX ORDER — NITROFURANTOIN 25; 75 MG/1; MG/1
100 CAPSULE ORAL 2 TIMES DAILY
Qty: 10 CAPSULE | Refills: 0 | Status: SHIPPED | OUTPATIENT
Start: 2025-02-07 | End: 2025-02-19

## 2025-02-12 RX ORDER — ROSUVASTATIN CALCIUM 5 MG/1
5 TABLET, COATED ORAL
Qty: 90 TABLET | Refills: 0 | Status: SHIPPED | OUTPATIENT
Start: 2025-02-12

## 2025-02-12 NOTE — TELEPHONE ENCOUNTER
No care due was identified.  [facility] Embedded Care Due Messages. Reference number: [id number].   2/12/2025 8:42:26 AM CST

## 2025-02-12 NOTE — TELEPHONE ENCOUNTER
Refill Routing Note   Medication(s) are not appropriate for processing by [facility] for the following reason(s):        Required labs outdated    [facility] action(s):  Defer             Appointments  past 12m or future 3m with PCP    Date Provider   Last Visit   8/30/2024 Cat Oconnell MD   Next Visit   2/19/2025 Cat Oconnell MD   ED visits in past 90 days: 0        Note composed:11:26 AM 02/12/2025

## 2025-02-19 ENCOUNTER — TELEPHONE (OUTPATIENT)
Dept: INTERNAL MEDICINE | Facility: CLINIC | Age: OVER 89
End: 2025-02-19
Payer: MEDICARE

## 2025-02-19 ENCOUNTER — CLINICAL SUPPORT (OUTPATIENT)
Dept: INTERNAL MEDICINE | Facility: CLINIC | Age: OVER 89
End: 2025-02-19
Payer: MEDICARE

## 2025-02-19 ENCOUNTER — OFFICE VISIT (OUTPATIENT)
Dept: INTERNAL MEDICINE | Facility: CLINIC | Age: OVER 89
End: 2025-02-19
Payer: MEDICARE

## 2025-02-19 VITALS
SYSTOLIC BLOOD PRESSURE: 136 MMHG | WEIGHT: 162.25 LBS | BODY MASS INDEX: 29.86 KG/M2 | DIASTOLIC BLOOD PRESSURE: 70 MMHG | OXYGEN SATURATION: 96 % | HEART RATE: 58 BPM | HEIGHT: 62 IN

## 2025-02-19 DIAGNOSIS — E55.9 VITAMIN D DEFICIENCY, UNSPECIFIED: ICD-10-CM

## 2025-02-19 DIAGNOSIS — N18.31 CHRONIC KIDNEY DISEASE, STAGE 3A: ICD-10-CM

## 2025-02-19 DIAGNOSIS — M54.2 NECK PAIN ON LEFT SIDE: ICD-10-CM

## 2025-02-19 DIAGNOSIS — E06.3 HYPOTHYROIDISM DUE TO HASHIMOTO'S THYROIDITIS: ICD-10-CM

## 2025-02-19 DIAGNOSIS — B37.2 CANDIDAL DERMATITIS: ICD-10-CM

## 2025-02-19 DIAGNOSIS — F01.A0 MILD VASCULAR DEMENTIA WITHOUT BEHAVIORAL DISTURBANCE, PSYCHOTIC DISTURBANCE, MOOD DISTURBANCE, OR ANXIETY: ICD-10-CM

## 2025-02-19 DIAGNOSIS — E78.5 HYPERLIPIDEMIA LDL GOAL <100: ICD-10-CM

## 2025-02-19 DIAGNOSIS — L57.0 ACTINIC KERATOSES: ICD-10-CM

## 2025-02-19 DIAGNOSIS — R79.9 ABNORMAL FINDING OF BLOOD CHEMISTRY, UNSPECIFIED: ICD-10-CM

## 2025-02-19 DIAGNOSIS — G89.29 CHRONIC LEFT-SIDED LOW BACK PAIN, UNSPECIFIED WHETHER SCIATICA PRESENT: ICD-10-CM

## 2025-02-19 DIAGNOSIS — Z00.00 ANNUAL PHYSICAL EXAM: ICD-10-CM

## 2025-02-19 DIAGNOSIS — M81.0 AGE-RELATED OSTEOPOROSIS WITHOUT CURRENT PATHOLOGICAL FRACTURE: Primary | ICD-10-CM

## 2025-02-19 DIAGNOSIS — R41.89 COGNITIVE IMPAIRMENT: ICD-10-CM

## 2025-02-19 DIAGNOSIS — M54.50 CHRONIC LEFT-SIDED LOW BACK PAIN, UNSPECIFIED WHETHER SCIATICA PRESENT: ICD-10-CM

## 2025-02-19 DIAGNOSIS — I69.30 HISTORY OF CVA WITH RESIDUAL DEFICIT: ICD-10-CM

## 2025-02-19 DIAGNOSIS — I10 BENIGN ESSENTIAL HTN: Primary | ICD-10-CM

## 2025-02-19 DIAGNOSIS — Z00.00 ANNUAL PHYSICAL EXAM: Primary | ICD-10-CM

## 2025-02-19 DIAGNOSIS — R73.03 PREDIABETES: ICD-10-CM

## 2025-02-19 LAB
25(OH)D3+25(OH)D2 SERPL-MCNC: 35 NG/ML (ref 30–96)
ALBUMIN SERPL BCP-MCNC: 3.9 G/DL (ref 3.5–5.2)
ALP SERPL-CCNC: 66 U/L (ref 40–150)
ALT SERPL W/O P-5'-P-CCNC: 14 U/L (ref 10–44)
ANION GAP SERPL CALC-SCNC: 11 MMOL/L (ref 8–16)
AST SERPL-CCNC: 32 U/L (ref 10–40)
BACTERIA #/AREA URNS AUTO: ABNORMAL /HPF
BASOPHILS # BLD AUTO: 0.05 K/UL (ref 0–0.2)
BASOPHILS NFR BLD: 0.8 % (ref 0–1.9)
BILIRUB SERPL-MCNC: 0.8 MG/DL (ref 0.1–1)
BUN SERPL-MCNC: 20 MG/DL (ref 10–30)
CALCIUM SERPL-MCNC: 9.8 MG/DL (ref 8.7–10.5)
CHLORIDE SERPL-SCNC: 105 MMOL/L (ref 95–110)
CHOLEST SERPL-MCNC: 133 MG/DL (ref 120–199)
CHOLEST/HDLC SERPL: 2.1 {RATIO} (ref 2–5)
CO2 SERPL-SCNC: 24 MMOL/L (ref 23–29)
CREAT SERPL-MCNC: 1 MG/DL (ref 0.5–1.4)
DIFFERENTIAL METHOD BLD: ABNORMAL
EOSINOPHIL # BLD AUTO: 0.2 K/UL (ref 0–0.5)
EOSINOPHIL NFR BLD: 2.7 % (ref 0–8)
ERYTHROCYTE [DISTWIDTH] IN BLOOD BY AUTOMATED COUNT: 13.9 % (ref 11.5–14.5)
EST. GFR  (NO RACE VARIABLE): 51.9 ML/MIN/1.73 M^2
ESTIMATED AVG GLUCOSE: 103 MG/DL (ref 68–131)
GLUCOSE SERPL-MCNC: 98 MG/DL (ref 70–110)
HBA1C MFR BLD: 5.2 % (ref 4–5.6)
HCT VFR BLD AUTO: 40.5 % (ref 37–48.5)
HDLC SERPL-MCNC: 63 MG/DL (ref 40–75)
HDLC SERPL: 47.4 % (ref 20–50)
HGB BLD-MCNC: 13.7 G/DL (ref 12–16)
HYALINE CASTS UR QL AUTO: 13 /LPF
IMM GRANULOCYTES # BLD AUTO: 0.02 K/UL (ref 0–0.04)
IMM GRANULOCYTES NFR BLD AUTO: 0.3 % (ref 0–0.5)
LDLC SERPL CALC-MCNC: 48.2 MG/DL (ref 63–159)
LYMPHOCYTES # BLD AUTO: 2.4 K/UL (ref 1–4.8)
LYMPHOCYTES NFR BLD: 37.4 % (ref 18–48)
MCH RBC QN AUTO: 32.5 PG (ref 27–31)
MCHC RBC AUTO-ENTMCNC: 33.8 G/DL (ref 32–36)
MCV RBC AUTO: 96 FL (ref 82–98)
MICROSCOPIC COMMENT: ABNORMAL
MONOCYTES # BLD AUTO: 0.8 K/UL (ref 0.3–1)
MONOCYTES NFR BLD: 11.8 % (ref 4–15)
NEUTROPHILS # BLD AUTO: 3 K/UL (ref 1.8–7.7)
NEUTROPHILS NFR BLD: 47 % (ref 38–73)
NONHDLC SERPL-MCNC: 70 MG/DL
NRBC BLD-RTO: 0 /100 WBC
PLATELET # BLD AUTO: 180 K/UL (ref 150–450)
PMV BLD AUTO: 9.9 FL (ref 9.2–12.9)
POTASSIUM SERPL-SCNC: 4.6 MMOL/L (ref 3.5–5.1)
PROT SERPL-MCNC: 7.8 G/DL (ref 6–8.4)
RBC # BLD AUTO: 4.21 M/UL (ref 4–5.4)
RBC #/AREA URNS AUTO: 1 /HPF (ref 0–4)
SODIUM SERPL-SCNC: 140 MMOL/L (ref 136–145)
SQUAMOUS #/AREA URNS AUTO: 1 /HPF
TRIGL SERPL-MCNC: 109 MG/DL (ref 30–150)
TSH SERPL DL<=0.005 MIU/L-ACNC: 0.78 UIU/ML (ref 0.4–4)
WBC # BLD AUTO: 6.33 K/UL (ref 3.9–12.7)
WBC #/AREA URNS AUTO: 1 /HPF (ref 0–5)

## 2025-02-19 PROCEDURE — 80053 COMPREHEN METABOLIC PANEL: CPT | Performed by: INTERNAL MEDICINE

## 2025-02-19 PROCEDURE — 36415 COLL VENOUS BLD VENIPUNCTURE: CPT | Performed by: INTERNAL MEDICINE

## 2025-02-19 PROCEDURE — 80061 LIPID PANEL: CPT | Performed by: INTERNAL MEDICINE

## 2025-02-19 PROCEDURE — 84443 ASSAY THYROID STIM HORMONE: CPT | Performed by: INTERNAL MEDICINE

## 2025-02-19 PROCEDURE — 99212 OFFICE O/P EST SF 10 MIN: CPT | Mod: PBBFAC | Performed by: INTERNAL MEDICINE

## 2025-02-19 PROCEDURE — 99214 OFFICE O/P EST MOD 30 MIN: CPT | Mod: S$PBB,,, | Performed by: INTERNAL MEDICINE

## 2025-02-19 PROCEDURE — 81001 URINALYSIS AUTO W/SCOPE: CPT | Performed by: INTERNAL MEDICINE

## 2025-02-19 PROCEDURE — 82306 VITAMIN D 25 HYDROXY: CPT | Performed by: INTERNAL MEDICINE

## 2025-02-19 PROCEDURE — 85025 COMPLETE CBC W/AUTO DIFF WBC: CPT | Performed by: INTERNAL MEDICINE

## 2025-02-19 PROCEDURE — 83036 HEMOGLOBIN GLYCOSYLATED A1C: CPT | Performed by: INTERNAL MEDICINE

## 2025-02-19 PROCEDURE — 99999 PR PBB SHADOW E&M-EST. PATIENT-LVL II: CPT | Mod: PBBFAC,,, | Performed by: INTERNAL MEDICINE

## 2025-02-19 RX ORDER — DICLOFENAC SODIUM 10 MG/G
2 GEL TOPICAL 3 TIMES DAILY
Qty: 100 G | Refills: 1 | Status: SHIPPED | OUTPATIENT
Start: 2025-02-19

## 2025-02-19 RX ORDER — MEMANTINE HYDROCHLORIDE 5 MG/1
5 TABLET ORAL 2 TIMES DAILY
Qty: 60 TABLET | Refills: 5 | Status: SHIPPED | OUTPATIENT
Start: 2025-02-19 | End: 2025-02-19 | Stop reason: SDUPTHER

## 2025-02-19 RX ORDER — MEMANTINE HYDROCHLORIDE 5 MG/1
5 TABLET ORAL 2 TIMES DAILY
Qty: 60 TABLET | Refills: 5 | Status: SHIPPED | OUTPATIENT
Start: 2025-02-19 | End: 2026-02-19

## 2025-02-19 RX ORDER — DICLOFENAC SODIUM 10 MG/G
2 GEL TOPICAL 3 TIMES DAILY
Qty: 100 G | Refills: 1 | Status: SHIPPED | OUTPATIENT
Start: 2025-02-19 | End: 2025-02-19 | Stop reason: SDUPTHER

## 2025-02-19 NOTE — TELEPHONE ENCOUNTER
We need to call Jozef [phone] and find out who is so nurse taking care of her at the assisted living.  Not [facility]'s nursing home.     Then I need them to fax two things to us:     1) dr milton in psychiatry last note     2) more importantly dr cheung, the dermatologist last note.   She has a SCC cancer on her left outer leg .     3)  an updated MAR     4) can we make a STICKY NOTE WITH HER CONTACT ASSISTED LIVING INFORMATION.     5) ALSO CAN WE FAX OVER THE PAPER RXS FOR DICLOFENAC GEL TO THE NURSE.       Lastly , it looks like the namenda is NOT covered by her insurance according to our epic record . I would call the daughter and let her know. It may not be expensive. As it is generic

## 2025-02-23 ENCOUNTER — RESULTS FOLLOW-UP (OUTPATIENT)
Dept: HOME HEALTH SERVICES | Facility: HOSPITAL | Age: OVER 89
End: 2025-02-23

## 2025-02-24 NOTE — PROGRESS NOTES
Subjective:       Patient ID: Sonja Weeks is a 89 y.o. female who presents today for:    Chief Complaint:   Chief Complaint   Patient presents with    Annual Exam       History of Present Illness    CHIEF COMPLAINT:  Patient presents today with back and neck pain.    MUSCULOSKELETAL PAIN:  She reports back pain that progressively worsens throughout the day without radiation to the legs. She also experiences mild left-sided neck pain without nocturnal symptoms. Previous treatment with pain management specialist included an injection which provided only temporary relief. She declined recommended MRI for further evaluation.    GASTROINTESTINAL:  She experienced one episode of vomiting this morning described as phlegm-like, occurring after consuming a light breakfast of eggs, oatmeal, and orange juice. She denies associated fever.    NEUROLOGICAL:  She reports ongoing memory problems since her stroke, following with psychiatrist Dr. Jorge every three months.    MEDICATIONS:  She receives Prolia injections twice yearly for bone health, with next injection scheduled for March 3rd.      ROS:  General: -fever, -chills, -fatigue, -weight gain, -weight loss  Eyes: -vision changes, -redness, -discharge  ENT: -ear pain, -nasal congestion, -sore throat  Cardiovascular: -chest pain, -palpitations, -lower extremity edema  Respiratory: -cough, -shortness of breath  Gastrointestinal: -abdominal pain, -nausea, +vomiting, -diarrhea, -constipation, -blood in stool  Genitourinary: -dysuria, -hematuria, -frequency  Musculoskeletal: -joint pain, -muscle pain, +neck pain, +back pain  Skin: -rash, -lesion  Neurological: -headache, -dizziness, -numbness, -tingling  Psychiatric: -anxiety, -depression, -sleep difficulty, +memory problems           Medications:  Encounter Medications[1]    Allergies:  Review of patient's allergies indicates:   Allergen Reactions    Codeine Hallucinations    Dexamethasone Other (See Comments)    Pcn  "[penicillins]            Objective:      Vital Signs  Pulse: (!) 58  SpO2: 96 %  BP: 136/70  Patient Position: Sitting  Pain Score:   5  Height and Weight  Height: 5' 2" (157.5 cm)  Weight: 73.6 kg (162 lb 4.1 oz)  BSA (Calculated - sq m): 1.79 sq meters  BMI (Calculated): 29.7  Weight in (lb) to have BMI = 25: 136.4]    Physical Exam   Physical Exam    General: No acute distress. Well-developed. Well-nourished.  Eyes: EOMI. Sclerae anicteric.  HENT: Normocephalic. Atraumatic.  Cardiovascular: Regular rate. Regular rhythm. No murmurs. No rubs. No gallops. Normal S1, S2.  Respiratory: Normal respiratory effort. Clear to auscultation bilaterally. No rales. No rhonchi. No wheezing.  Abdomen: Soft. Non-tender. Non-distended. Normoactive bowel sounds.  Musculoskeletal: No  obvious deformity. Tenderness in left neck strap muscle.  Extremities: No lower extremity edema.  Neurological: Alert & oriented x3. No slurred speech. Normal gait.  Psychiatric: Normal mood. Normal affect. Good insight. Good judgment.  Skin: Warm. Dry. No rash.        Assessment/plan:     Sonja Weeks is a 89 y.o.female here for an ANNUAL PHYSICAL EXAM:    Health Maintenance:  Health Maintenance   Topic Date Due    Shingles Vaccine (2 of 3) 08/14/2015    TETANUS VACCINE  11/30/2026    Lipid Panel  02/19/2030    Influenza Vaccine  Completed    COVID-19 Vaccine  Completed    RSV Vaccine (Age 60+ and Pregnant patients)  Completed    Pneumococcal Vaccines (Age 50+)  Completed        Benign essential HTN    Hyperlipidemia LDL goal <100    Mild vascular dementia without behavioral disturbance, psychotic disturbance, mood disturbance, or anxiety    History of CVA with residual deficit    Neck pain on left side  -     Discontinue: diclofenac sodium (VOLTAREN) 1 % Gel; Apply 2 g topically 3 (three) times daily. Apply to left neck area and left low back area  Dispense: 100 g; Refill: 1    Chronic left-sided low back pain, unspecified whether sciatica " present  -     Discontinue: diclofenac sodium (VOLTAREN) 1 % Gel; Apply 2 g topically 3 (three) times daily. Apply to left neck area and left low back area  Dispense: 100 g; Refill: 1    Chronic kidney disease, stage 3a    Hypothyroidism due to Hashimoto's thyroiditis    Actinic keratoses-left outer leg  Comments:  will touch base with gissell and dr cheung      Assessment & Plan    IMPRESSION:  - Assessed back and neck pain, considering previous interventions including pain management referral and MRI recommendation  - Evaluated skin concerns, noting previous treatment with chemo injections by Dr. Stafford  - Reviewed lab results: normal CBC, electrolytes, and liver enzymes; kidney function borderline but acceptable for patient's age  - Considered Dr. 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    NECK PAIN:  - Examined the neck and identified pain in the left strap muscle.  - Discussed previous treatment attempts and potential next steps.    ACTINIC KERATOSES:  - Examined lesions on patient's face.  - Explained nature of actinic keratoses and potential progression.  - Recommend using moisturizer.  - Will follow up after reviewing Dr. Cheung's dermatology notes from [facility] visits.    UNSPECIFIED LEG CONDITION:  - Examined a soft area on the leg that might have been treated with chemotherapy.  - Will review records from dermatologist and consider referral back if necessary.  - Will contact [facility] to ensure left leg is examined during next dermatology visit.    KNEE CONDITION:  - Evaluated current knee status.  - Noted patient uses elastic support for knee occasionally.    OSTEOPOROSIS:  - Continued Prolia injections twice yearly for bone health, with next dose scheduled for March 3rd.    MEMORY ISSUES:  - Discussed potential medication options with  patient and family.  - Provided information on Namenda's potential benefits for brain circulation and memory.  - Considering prescribing Namenda.    KIDNEY FUNCTION:  - Monitored kidney function through labs, evaluating based on GFR.    LABS:  - Ordered CBC, electrolytes, liver enzymes, kidney function, and cholesterol tests.  - Ordered urine sample for analysis.    PSYCHIATRIC CARE:  - Acknowledged ongoing psychiatric care.  - Noted patient is on low-dose medication for mood management.  - Will follow up after reviewing Dr. Jorge's psychiatric notes from [facility] visits.    VOMITING:  - Evaluated the nature and frequency of the reported morning vomiting episode.  - Assessed potential causes.    NUTRITION:  - Explained importance of maintaining good protein levels for nutrition and overall health.  - Discussed importance of hydration and recommended increased fluid intake between meals, specifically a glass of Jhony water between breakfast and lunch, and between lunch and dinner.    COGNITIVE STIMULATION:  - Patient to continue playing games like [employer] for cognitive stimulation.    THYROID DISORDER:  - Thyroid and vitamin D levels pending.        Future Appointments   Date Time Provider Department Center   3/3/2025  2:15 PM [facility]     This note was generated with the assistance of ambient listening technology. Verbal consent was obtained by the patient and accompanying visitor(s) for the recording of patient appointment to facilitate this note. I attest to having reviewed and edited the generated note for accuracy, though some syntax or spelling errors may persist. Please contact the author of this note for any clarification.     Cat Ogden MD  [facility]       [1]   Outpatient Encounter Medications as of 2/19/2025   Medication Sig Dispense Refill    amLODIPine (NORVASC) 2.5 MG tablet TAKE 1 TAB BY MOUTH EVERY DAY 90 tablet 2    azelastine (ASTELIN)  137 mcg (0.1 %) nasal spray 1 spray (137 mcg total) by Nasal route 2 (two) times daily. 60 mL 2    cholecalciferol, vitamin D3, (VITAMIN D3) 25 mcg (1,000 unit) capsule Take 1,000 Units by mouth once daily.      levothyroxine (SYNTHROID) 112 MCG tablet TAKE 1 TAB BY MOUTH EVERY MORNING 90 tablet 2    miconazole NITRATE 2 % (MICOTIN) 2 % top powder Apply topically 2 (two) times a day. On top of nystatin cream for 14 days 71 g 0    nystatin (MYCOSTATIN) powder APPLY TO RED AND ITCHY RASH UNDER ABDOMINAL FOLDS AS NEEDED 30 g 11    polyethylene glycol (GLYCOLAX) 17 gram PwPk Take 17 g by mouth daily as needed. 30 each 3    rosuvastatin (CRESTOR) 5 MG tablet TAKE 1 TAB BY MOUTH EVERY DAY 90 tablet 0    sertraline (ZOLOFT) 25 MG tablet TAKE 1 TAB BY MOUTH EVERY DAY 90 tablet 2    valsartan (DIOVAN) 40 MG tablet TAKE 1 TAB BY MOUTH EVERY DAY 90 tablet 2    [DISCONTINUED] diclofenac sodium (VOLTAREN) 1 % Gel APPLY TO LEFT KNEE FOUR TIMES DAILY AS NEEDED 400 g 0    [DISCONTINUED] diclofenac sodium (VOLTAREN) 1 % Gel Apply 2 g topically 3 (three) times daily. Apply to left neck area and left low back area 100 g 1    [DISCONTINUED] nitrofurantoin, macrocrystal-monohydrate, (MACROBID) 100 MG capsule Take 1 capsule (100 mg total) by mouth 2 (two) times daily. 10 capsule 0    [DISCONTINUED] rosuvastatin (CRESTOR) 5 MG tablet TAKE 1 TAB BY MOUTH EVERY DAY 90 tablet 0     No facility-administered encounter medications on file as of 2/19/2025.

## 2025-02-26 ENCOUNTER — TELEPHONE (OUTPATIENT)
Dept: HOME HEALTH SERVICES | Facility: HOSPITAL | Age: OVER 89
End: 2025-02-26
Payer: MEDICARE

## 2025-03-03 ENCOUNTER — INFUSION (OUTPATIENT)
Dept: INFECTIOUS DISEASES | Facility: HOSPITAL | Age: OVER 89
End: 2025-03-03
Payer: MEDICARE

## 2025-03-03 VITALS
DIASTOLIC BLOOD PRESSURE: 74 MMHG | WEIGHT: 162.69 LBS | TEMPERATURE: 98 F | SYSTOLIC BLOOD PRESSURE: 177 MMHG | HEIGHT: 62 IN | OXYGEN SATURATION: 96 % | RESPIRATION RATE: 20 BRPM | HEART RATE: 58 BPM | BODY MASS INDEX: 29.94 KG/M2

## 2025-03-03 DIAGNOSIS — M81.0 AGE-RELATED OSTEOPOROSIS WITHOUT CURRENT PATHOLOGICAL FRACTURE: Primary | ICD-10-CM

## 2025-03-03 DIAGNOSIS — N18.31 CHRONIC KIDNEY DISEASE, STAGE 3A: ICD-10-CM

## 2025-03-03 PROCEDURE — 63600175 PHARM REV CODE 636 W HCPCS: Mod: JZ,TB | Performed by: INTERNAL MEDICINE

## 2025-03-03 PROCEDURE — 96372 THER/PROPH/DIAG INJ SC/IM: CPT

## 2025-03-03 RX ADMIN — DENOSUMAB 60 MG: 60 INJECTION SUBCUTANEOUS at 02:03

## 2025-03-03 NOTE — PROGRESS NOTES
Patient arrives for prolia injection. Daughter states unsure if taking calcium and vitamin D supplements. Stated [facility]. Instructed on reasons to take and current levels. Instructed to contact provider with any questions regarding dosages. Denies dental procedures over past 3 months - administered per guidelines.    Limited head-to-toe assessment due to privacy issues and visit reason though the opportunity was given for patient to express any concerns

## 2025-03-06 ENCOUNTER — TELEPHONE (OUTPATIENT)
Dept: INTERNAL MEDICINE | Facility: CLINIC | Age: OVER 89
End: 2025-03-06
Payer: MEDICARE

## 2025-03-06 NOTE — TELEPHONE ENCOUNTER
We need a STICKY note of the nurse taking care of her at [facility] living or the extension   Could be Karley ??     Ultimately we need to give a VERBAL ORDER FOR A CONSULT TO DR LUNA WHO GOES OUT TO [facility] TO LOOK AT HER LEFT LOWER LEG LESION WHICH COULD BE A SCC     Thank you

## 2025-03-18 RX ORDER — ROSUVASTATIN CALCIUM 5 MG/1
5 TABLET, COATED ORAL
Qty: 90 TABLET | Refills: 3 | Status: SHIPPED | OUTPATIENT
Start: 2025-03-18

## 2025-03-18 NOTE — TELEPHONE ENCOUNTER
No care due was identified.  [facility] Embedded Care Due Messages. Reference number: [id number].   3/18/2025 11:37:28 AM CDT

## 2025-03-19 NOTE — TELEPHONE ENCOUNTER
Refill Decision Note   Sonja Weeks  is requesting a refill authorization.  Brief Assessment and Rationale for Refill:  Approve     Medication Therapy Plan:         Comments:     Note composed:11:10 PM 03/18/2025             Appointments     Last Visit   2/19/2025 Cat Oconnell MD   Next Visit   Visit date not found Cat Oconnell MD

## 2025-03-25 NOTE — TELEPHONE ENCOUNTER
Refill Routing Note   Medication(s) are not appropriate for processing by [facility] for the following reason(s):        Required vitals abnormal    [facility] action(s):  Defer               Appointments  past 12m or future 3m with PCP    Date Provider   Last Visit   2/19/2025 Cat Oconnell MD   Next Visit   Visit date not found Cat Oconnell MD   ED visits in past 90 days: 0        Note composed:5:25 PM 03/25/2025

## 2025-03-25 NOTE — TELEPHONE ENCOUNTER
No care due was identified.  [facility] Embedded Care Due Messages. Reference number: [id number].   3/25/2025 11:33:44 AM CDT

## 2025-03-26 RX ORDER — VALSARTAN 40 MG/1
40 TABLET ORAL
Qty: 90 TABLET | Refills: 3 | Status: SHIPPED | OUTPATIENT
Start: 2025-03-26

## 2025-04-08 RX ORDER — SERTRALINE HYDROCHLORIDE 25 MG/1
25 TABLET, FILM COATED ORAL
Qty: 90 TABLET | Refills: 2 | Status: SHIPPED | OUTPATIENT
Start: 2025-04-08

## 2025-04-08 NOTE — TELEPHONE ENCOUNTER
Sonja Weeks  is requesting a refill authorization.  Brief Assessment and Rationale for Refill:  Approve     Medication Therapy Plan:         Comments:     Note composed:12:31 PM 04/08/2025

## 2025-04-08 NOTE — TELEPHONE ENCOUNTER
No care due was identified.  [facility] Embedded Care Due Messages. Reference number: [id number].   4/08/2025 8:33:34 AM CDT

## 2025-05-01 ENCOUNTER — PATIENT MESSAGE (OUTPATIENT)
Dept: INTERNAL MEDICINE | Facility: CLINIC | Age: OVER 89
End: 2025-05-01
Payer: MEDICARE

## 2025-05-02 DIAGNOSIS — K59.00 CONSTIPATION, UNSPECIFIED CONSTIPATION TYPE: Primary | ICD-10-CM

## 2025-05-02 RX ORDER — POLYETHYLENE GLYCOL 3350 17 G/17G
17 POWDER, FOR SOLUTION ORAL DAILY
Qty: 30 EACH | Refills: 3 | Status: SHIPPED | OUTPATIENT
Start: 2025-05-02

## 2025-05-02 RX ORDER — POLYETHYLENE GLYCOL 3350 17 G/17G
17 POWDER, FOR SOLUTION ORAL DAILY
Qty: 30 EACH | Refills: 3 | Status: SHIPPED | OUTPATIENT
Start: 2025-05-02 | End: 2025-05-02 | Stop reason: SDUPTHER

## 2025-06-03 DIAGNOSIS — F01.A0 MILD VASCULAR DEMENTIA WITHOUT BEHAVIORAL DISTURBANCE, PSYCHOTIC DISTURBANCE, MOOD DISTURBANCE, OR ANXIETY: ICD-10-CM

## 2025-06-03 DIAGNOSIS — R41.89 COGNITIVE IMPAIRMENT: ICD-10-CM

## 2025-06-03 RX ORDER — MEMANTINE HYDROCHLORIDE 5 MG/1
TABLET ORAL
Qty: 60 TABLET | Refills: 5 | Status: SHIPPED | OUTPATIENT
Start: 2025-06-03

## 2025-06-03 RX ORDER — CALCIUM CARBONATE 500(1250)
1 TABLET ORAL DAILY
Qty: 30 TABLET | Refills: 9 | Status: SHIPPED | OUTPATIENT
Start: 2025-06-03

## 2025-06-10 RX ORDER — CHOLECALCIFEROL (VITAMIN D3) 25 MCG
1000 TABLET ORAL
Qty: 30 TABLET | Refills: 0 | Status: SHIPPED | OUTPATIENT
Start: 2025-06-10

## 2025-06-10 RX ORDER — ACETAMINOPHEN 500 MG
TABLET ORAL
Qty: 60 TABLET | Refills: 11 | Status: SHIPPED | OUTPATIENT
Start: 2025-06-10

## 2025-06-10 NOTE — TELEPHONE ENCOUNTER
No care due was identified.  [facility] Embedded Care Due Messages. Reference number: [id number].   6/10/2025 8:20:31 AM CDT

## 2025-06-20 RX ORDER — CHOLECALCIFEROL (VITAMIN D3) 25 MCG
1000 TABLET ORAL DAILY
Qty: 30 TABLET | Refills: 11 | Status: SHIPPED | OUTPATIENT
Start: 2025-06-20

## 2025-07-08 DIAGNOSIS — G89.29 CHRONIC LEFT-SIDED LOW BACK PAIN, UNSPECIFIED WHETHER SCIATICA PRESENT: ICD-10-CM

## 2025-07-08 DIAGNOSIS — M54.50 CHRONIC LEFT-SIDED LOW BACK PAIN, UNSPECIFIED WHETHER SCIATICA PRESENT: ICD-10-CM

## 2025-07-08 DIAGNOSIS — M54.2 NECK PAIN ON LEFT SIDE: ICD-10-CM

## 2025-07-08 RX ORDER — LEVOTHYROXINE SODIUM 112 UG/1
TABLET ORAL
Qty: 90 TABLET | Refills: 2 | Status: SHIPPED | OUTPATIENT
Start: 2025-07-08

## 2025-07-08 RX ORDER — DICLOFENAC SODIUM 10 MG/G
GEL TOPICAL
Qty: 100 G | Refills: 11 | Status: SHIPPED | OUTPATIENT
Start: 2025-07-08

## 2025-07-08 NOTE — TELEPHONE ENCOUNTER
No care due was identified.  [facility] Embedded Care Due Messages. Reference number: [id number].   7/08/2025 7:55:41 AM CDT

## 2025-07-08 NOTE — TELEPHONE ENCOUNTER
Refill Routing Note   Medication(s) are not appropriate for processing by [facility] for the following reason(s):        Outside of protocol    [facility] action(s):  Route  Approve               Appointments  past 12m or future 3m with PCP    Date Provider   Last Visit   2/19/2025 Cat Oconnell MD   Next Visit   Visit date not found Cat Oconnell MD   ED visits in past 90 days: 0        Note composed:4:35 PM 07/08/2025

## 2025-07-15 DIAGNOSIS — K59.00 CONSTIPATION, UNSPECIFIED CONSTIPATION TYPE: ICD-10-CM

## 2025-07-15 RX ORDER — POLYETHYLENE GLYCOL 3350 17 G/17G
POWDER, FOR SOLUTION ORAL
Qty: 30 PACKET | Refills: 11 | Status: SHIPPED | OUTPATIENT
Start: 2025-07-15

## 2025-07-15 NOTE — TELEPHONE ENCOUNTER
No care due was identified.  [facility] Embedded Care Due Messages. Reference number: [id number].   7/15/2025 8:28:45 AM CDT

## 2025-08-15 ENCOUNTER — OFFICE VISIT (OUTPATIENT)
Dept: INTERNAL MEDICINE | Facility: CLINIC | Age: OVER 89
End: 2025-08-15
Payer: MEDICARE

## 2025-08-15 ENCOUNTER — CLINICAL SUPPORT (OUTPATIENT)
Dept: INTERNAL MEDICINE | Facility: CLINIC | Age: OVER 89
End: 2025-08-15
Payer: MEDICARE

## 2025-08-15 VITALS
HEIGHT: 62 IN | SYSTOLIC BLOOD PRESSURE: 142 MMHG | OXYGEN SATURATION: 95 % | HEART RATE: 65 BPM | DIASTOLIC BLOOD PRESSURE: 66 MMHG | BODY MASS INDEX: 31.56 KG/M2 | WEIGHT: 171.5 LBS

## 2025-08-15 DIAGNOSIS — R73.9 HYPERGLYCEMIA: ICD-10-CM

## 2025-08-15 DIAGNOSIS — E03.9 ACQUIRED HYPOTHYROIDISM: ICD-10-CM

## 2025-08-15 DIAGNOSIS — I10 BENIGN ESSENTIAL HTN: ICD-10-CM

## 2025-08-15 DIAGNOSIS — I35.1 AORTIC EJECTION MURMUR: ICD-10-CM

## 2025-08-15 DIAGNOSIS — R53.83 FATIGUE, UNSPECIFIED TYPE: ICD-10-CM

## 2025-08-15 DIAGNOSIS — L30.9 ECZEMA, UNSPECIFIED TYPE: Primary | ICD-10-CM

## 2025-08-15 DIAGNOSIS — M81.0 AGE-RELATED OSTEOPOROSIS WITHOUT CURRENT PATHOLOGICAL FRACTURE: ICD-10-CM

## 2025-08-15 LAB
ABSOLUTE EOSINOPHIL (OHS): 0.18 K/UL
ABSOLUTE MONOCYTE (OHS): 0.77 K/UL (ref 0.3–1)
ABSOLUTE NEUTROPHIL COUNT (OHS): 2.75 K/UL (ref 1.8–7.7)
ALBUMIN SERPL BCP-MCNC: 3.8 G/DL (ref 3.5–5.2)
ALP SERPL-CCNC: 57 UNIT/L (ref 40–150)
ALT SERPL W/O P-5'-P-CCNC: 13 UNIT/L (ref 0–55)
ANION GAP (OHS): 10 MMOL/L (ref 8–16)
AST SERPL-CCNC: 25 UNIT/L (ref 0–50)
BASOPHILS # BLD AUTO: 0.04 K/UL
BASOPHILS NFR BLD AUTO: 0.6 %
BILIRUB SERPL-MCNC: 0.7 MG/DL (ref 0.1–1)
BUN SERPL-MCNC: 22 MG/DL (ref 10–30)
CALCIUM SERPL-MCNC: 9.2 MG/DL (ref 8.7–10.5)
CHLORIDE SERPL-SCNC: 104 MMOL/L (ref 95–110)
CO2 SERPL-SCNC: 29 MMOL/L (ref 23–29)
CREAT SERPL-MCNC: 1.3 MG/DL (ref 0.5–1.4)
EAG (OHS): 108 MG/DL (ref 68–131)
ERYTHROCYTE [DISTWIDTH] IN BLOOD BY AUTOMATED COUNT: 15 % (ref 11.5–14.5)
GFR SERPLBLD CREATININE-BSD FMLA CKD-EPI: 38 ML/MIN/1.73/M2
GLUCOSE SERPL-MCNC: 109 MG/DL (ref 70–110)
HBA1C MFR BLD: 5.4 % (ref 4–5.6)
HCT VFR BLD AUTO: 41.5 % (ref 37–48.5)
HGB BLD-MCNC: 13.8 GM/DL (ref 12–16)
IMM GRANULOCYTES # BLD AUTO: 0.02 K/UL (ref 0–0.04)
IMM GRANULOCYTES NFR BLD AUTO: 0.3 % (ref 0–0.5)
LYMPHOCYTES # BLD AUTO: 2.7 K/UL (ref 1–4.8)
MCH RBC QN AUTO: 32.2 PG (ref 27–31)
MCHC RBC AUTO-ENTMCNC: 33.3 G/DL (ref 32–36)
MCV RBC AUTO: 97 FL (ref 82–98)
NUCLEATED RBC (/100WBC) (OHS): 0 /100 WBC
PLATELET # BLD AUTO: 172 K/UL (ref 150–450)
PMV BLD AUTO: 11 FL (ref 9.2–12.9)
POTASSIUM SERPL-SCNC: 4.2 MMOL/L (ref 3.5–5.1)
PROT SERPL-MCNC: 7.4 GM/DL (ref 6–8.4)
RBC # BLD AUTO: 4.29 M/UL (ref 4–5.4)
RELATIVE EOSINOPHIL (OHS): 2.8 %
RELATIVE LYMPHOCYTE (OHS): 41.8 % (ref 18–48)
RELATIVE MONOCYTE (OHS): 11.9 % (ref 4–15)
RELATIVE NEUTROPHIL (OHS): 42.6 % (ref 38–73)
SODIUM SERPL-SCNC: 143 MMOL/L (ref 136–145)
TSH SERPL-ACNC: 2.4 UIU/ML (ref 0.4–4)
WBC # BLD AUTO: 6.46 K/UL (ref 3.9–12.7)

## 2025-08-15 PROCEDURE — 99212 OFFICE O/P EST SF 10 MIN: CPT | Mod: PBBFAC | Performed by: INTERNAL MEDICINE

## 2025-08-15 PROCEDURE — 85025 COMPLETE CBC W/AUTO DIFF WBC: CPT

## 2025-08-15 PROCEDURE — 84443 ASSAY THYROID STIM HORMONE: CPT

## 2025-08-15 PROCEDURE — 99999 PR PBB SHADOW E&M-EST. PATIENT-LVL II: CPT | Mod: PBBFAC,,, | Performed by: INTERNAL MEDICINE

## 2025-08-15 PROCEDURE — 83036 HEMOGLOBIN GLYCOSYLATED A1C: CPT

## 2025-08-15 PROCEDURE — 99214 OFFICE O/P EST MOD 30 MIN: CPT | Mod: S$PBB,,, | Performed by: INTERNAL MEDICINE

## 2025-08-15 PROCEDURE — 82040 ASSAY OF SERUM ALBUMIN: CPT

## 2025-08-15 RX ORDER — AMMONIUM LACTATE 12 G/100G
LOTION TOPICAL 2 TIMES DAILY PRN
Qty: 225 G | Refills: 3 | Status: SHIPPED | OUTPATIENT
Start: 2025-08-15 | End: 2025-08-18 | Stop reason: SDUPTHER

## 2025-08-18 DIAGNOSIS — I87.2 STASIS DERMATITIS OF BOTH LEGS: Primary | ICD-10-CM

## 2025-08-18 DIAGNOSIS — L30.9 ECZEMA, UNSPECIFIED TYPE: ICD-10-CM

## 2025-08-18 RX ORDER — AMMONIUM LACTATE 12 G/100G
LOTION TOPICAL 2 TIMES DAILY
Qty: 225 G | Refills: 3 | Status: SHIPPED | OUTPATIENT
Start: 2025-08-18